# Patient Record
Sex: MALE | Race: OTHER | Employment: FULL TIME | URBAN - METROPOLITAN AREA
[De-identification: names, ages, dates, MRNs, and addresses within clinical notes are randomized per-mention and may not be internally consistent; named-entity substitution may affect disease eponyms.]

---

## 2017-10-21 ENCOUNTER — HOSPITAL ENCOUNTER (EMERGENCY)
Age: 27
Discharge: HOME | End: 2017-10-21
Attending: EMERGENCY MEDICINE
Payer: COMMERCIAL

## 2017-10-21 VITALS
RESPIRATION RATE: 18 BRPM | HEART RATE: 89 BPM | TEMPERATURE: 97.7 F | OXYGEN SATURATION: 96 % | SYSTOLIC BLOOD PRESSURE: 134 MMHG | DIASTOLIC BLOOD PRESSURE: 79 MMHG

## 2017-10-21 DIAGNOSIS — H60.392 INFECTIVE OTITIS EXTERNA OF LEFT EAR: ICD-10-CM

## 2017-10-21 DIAGNOSIS — H61.22 EXCESSIVE EAR WAX, LEFT: Primary | ICD-10-CM

## 2017-10-21 PROCEDURE — 6370000000 HC RX 637 (ALT 250 FOR IP): Performed by: EMERGENCY MEDICINE

## 2017-10-21 PROCEDURE — G0381 LEV 2 HOSP TYPE B ED VISIT: HCPCS

## 2017-10-21 RX ORDER — CIPROFLOXACIN AND DEXAMETHASONE 3; 1 MG/ML; MG/ML
4 SUSPENSION/ DROPS AURICULAR (OTIC) 2 TIMES DAILY
Status: DISCONTINUED | OUTPATIENT
Start: 2017-10-21 | End: 2017-10-21 | Stop reason: HOSPADM

## 2017-10-21 RX ADMIN — CIPROFLOXACIN AND DEXAMETHASONE 4 DROP: 3; 1 SUSPENSION/ DROPS AURICULAR (OTIC) at 15:32

## 2017-10-21 RX ADMIN — CARBAMIDE PEROXIDE 6.5% 5 DROP: 6.5 LIQUID AURICULAR (OTIC) at 15:06

## 2017-10-21 ASSESSMENT — ENCOUNTER SYMPTOMS
ABDOMINAL PAIN: 0
RHINORRHEA: 0
SHORTNESS OF BREATH: 0

## 2017-10-21 ASSESSMENT — PAIN SCALES - GENERAL: PAINLEVEL_OUTOF10: 7

## 2017-10-21 ASSESSMENT — PAIN DESCRIPTION - LOCATION: LOCATION: EAR

## 2017-10-21 ASSESSMENT — PAIN DESCRIPTION - ORIENTATION: ORIENTATION: LEFT

## 2017-10-21 ASSESSMENT — PAIN DESCRIPTION - DESCRIPTORS: DESCRIPTORS: DISCOMFORT

## 2017-10-21 NOTE — ED PROVIDER NOTES
tenderness noted during ear examination, no mastoid process tenderness or erythema, tympanic membrane was difficult to assess given that there is impaction of ear wax right in front of the tympanic membrane, no discharge, no bleeding noted, no foreign body   Eyes: Right eye exhibits no discharge. Left eye exhibits no discharge. Cardiovascular: Normal rate, regular rhythm and normal heart sounds. Exam reveals no friction rub. No murmur heard. Pulmonary/Chest: Effort normal and breath sounds normal. No stridor. No respiratory distress. He has no wheezes. He has no rales. He exhibits no tenderness. Abdominal: Soft. He exhibits no distension. There is no tenderness. There is no guarding. Neurological: He is alert and oriented to person, place, and time. Skin: Skin is warm and dry. He is not diaphoretic. Nursing note and vitals reviewed. DIFFERENTIAL  DIAGNOSIS     PLAN (LABS / IMAGING / EKG):  No orders of the defined types were placed in this encounter. MEDICATIONS ORDERED:  Orders Placed This Encounter   Medications    ciprofloxacin-dexamethasone (CIPRODEX) otic suspension 4 drop    carbamide peroxide (DEBROX) 6.5 % otic solution 5 drop       DIAGNOSTIC RESULTS / EMERGENCY DEPARTMENT COURSE / MDM     LABS:  No results found for this visit on 10/21/17. RADIOLOGY:  Not indicated    EKG  Not indicated    EMERGENCY DEPARTMENT COURSE:    MDM: Patient with a impaction as well as what is possibly otitis externa. We'll give patient Ciprodex drops as well as drops for removal of earwax. Given how tender patient was during examination with otoscope would be difficult to attempt extraction given how far earwax is impacted and also significant amount of pain. We'll discharge patient with follow-up with otolaryngology. Advised to return to the emergency department if symptoms do not improve. Patient is agreeable plan. PROCEDURES:  None    CONSULTS:  None    FINAL IMPRESSION      1.  Excessive

## 2018-05-18 ENCOUNTER — HOSPITAL ENCOUNTER (OUTPATIENT)
Dept: GENERAL RADIOLOGY | Age: 28
Discharge: HOME | End: 2018-05-20
Payer: COMMERCIAL

## 2018-05-18 ENCOUNTER — HOSPITAL ENCOUNTER (OUTPATIENT)
Age: 28
Discharge: HOME | End: 2018-05-20
Payer: COMMERCIAL

## 2018-05-18 DIAGNOSIS — S89.92XA INJURY OF LEFT KNEE, INITIAL ENCOUNTER: ICD-10-CM

## 2018-05-18 DIAGNOSIS — G89.29 NECK PAIN, CHRONIC: ICD-10-CM

## 2018-05-18 DIAGNOSIS — M54.2 NECK PAIN, CHRONIC: ICD-10-CM

## 2018-05-18 PROCEDURE — 72040 X-RAY EXAM NECK SPINE 2-3 VW: CPT

## 2018-05-18 PROCEDURE — 73560 X-RAY EXAM OF KNEE 1 OR 2: CPT

## 2018-05-18 PROCEDURE — 72072 X-RAY EXAM THORAC SPINE 3VWS: CPT

## 2018-07-23 ENCOUNTER — HOSPITAL ENCOUNTER (INPATIENT)
Age: 28
LOS: 2 days | Discharge: HOME | DRG: 420 | End: 2018-07-26
Attending: EMERGENCY MEDICINE | Admitting: INTERNAL MEDICINE
Payer: COMMERCIAL

## 2018-07-23 DIAGNOSIS — E11.10 DIABETIC KETOACIDOSIS WITHOUT COMA ASSOCIATED WITH TYPE 2 DIABETES MELLITUS (HCC): Primary | ICD-10-CM

## 2018-07-23 LAB
ABSOLUTE EOS #: 0.24 K/UL (ref 0–0.44)
ABSOLUTE IMMATURE GRANULOCYTE: <0.03 K/UL (ref 0–0.3)
ABSOLUTE LYMPH #: 1.73 K/UL (ref 1.1–3.7)
ABSOLUTE MONO #: 0.43 K/UL (ref 0.1–1.2)
ALLEN TEST: ABNORMAL
ANION GAP SERPL CALCULATED.3IONS-SCNC: 16 MMOL/L (ref 9–17)
ANION GAP: 15 MMOL/L (ref 7–16)
BASOPHILS # BLD: 1 % (ref 0–2)
BASOPHILS ABSOLUTE: 0.03 K/UL (ref 0–0.2)
BETA-HYDROXYBUTYRATE: 2.27 MMOL/L (ref 0.02–0.27)
BILIRUBIN URINE: NEGATIVE
BUN BLDV-MCNC: 15 MG/DL (ref 6–20)
BUN/CREAT BLD: ABNORMAL (ref 9–20)
CALCIUM SERPL-MCNC: 8.9 MG/DL (ref 8.6–10.4)
CHLORIDE BLD-SCNC: 85 MMOL/L (ref 98–107)
CHP ED QC CHECK: ABNORMAL
CO2: 18 MMOL/L (ref 20–31)
COLOR: YELLOW
COMMENT UA: ABNORMAL
CREAT SERPL-MCNC: 1 MG/DL (ref 0.7–1.2)
DIFFERENTIAL TYPE: ABNORMAL
EOSINOPHILS RELATIVE PERCENT: 4 % (ref 1–4)
FIO2: ABNORMAL
GFR AFRICAN AMERICAN: >60 ML/MIN
GFR NON-AFRICAN AMERICAN: >60 ML/MIN
GFR NON-AFRICAN AMERICAN: >60 ML/MIN
GFR SERPL CREATININE-BSD FRML MDRD: >60 ML/MIN
GFR SERPL CREATININE-BSD FRML MDRD: ABNORMAL ML/MIN/{1.73_M2}
GFR SERPL CREATININE-BSD FRML MDRD: ABNORMAL ML/MIN/{1.73_M2}
GFR SERPL CREATININE-BSD FRML MDRD: NORMAL ML/MIN/{1.73_M2}
GLUCOSE BLD-MCNC: 1005 MG/DL (ref 70–99)
GLUCOSE BLD-MCNC: 700 MG/DL
GLUCOSE BLD-MCNC: >700 MG/DL (ref 74–100)
GLUCOSE URINE: ABNORMAL
HCO3 VENOUS: 20.1 MMOL/L (ref 22–29)
HCT VFR BLD CALC: 39.8 % (ref 40.7–50.3)
HEMOGLOBIN: 13 G/DL (ref 13–17)
IMMATURE GRANULOCYTES: 0 %
KETONES, URINE: ABNORMAL
LEUKOCYTE ESTERASE, URINE: NEGATIVE
LYMPHOCYTES # BLD: 30 % (ref 24–43)
MCH RBC QN AUTO: 30 PG (ref 25.2–33.5)
MCHC RBC AUTO-ENTMCNC: 32.7 G/DL (ref 28.4–34.8)
MCV RBC AUTO: 91.9 FL (ref 82.6–102.9)
MODE: ABNORMAL
MONOCYTES # BLD: 8 % (ref 3–12)
NEGATIVE BASE EXCESS, VEN: 4 (ref 0–2)
NITRITE, URINE: NEGATIVE
NRBC AUTOMATED: 0 PER 100 WBC
O2 DEVICE/FLOW/%: ABNORMAL
O2 SAT, VEN: 82 % (ref 60–85)
PATIENT TEMP: ABNORMAL
PCO2, VEN: 33.5 MM HG (ref 41–51)
PDW BLD-RTO: 12.1 % (ref 11.8–14.4)
PH UA: 5 (ref 5–8)
PH VENOUS: 7.39 (ref 7.32–7.43)
PLATELET # BLD: 140 K/UL (ref 138–453)
PLATELET ESTIMATE: ABNORMAL
PMV BLD AUTO: 12.6 FL (ref 8.1–13.5)
PO2, VEN: 46.3 MM HG (ref 30–50)
POC CHLORIDE: 90 MMOL/L (ref 98–107)
POC CREATININE: 0.77 MG/DL (ref 0.51–1.19)
POC HEMATOCRIT: 42 % (ref 41–53)
POC HEMOGLOBIN: 14.4 G/DL (ref 13.5–17.5)
POC IONIZED CALCIUM: 1.16 MMOL/L (ref 1.15–1.33)
POC LACTIC ACID: 1.35 MMOL/L (ref 0.56–1.39)
POC PCO2 TEMP: ABNORMAL MM HG
POC PH TEMP: ABNORMAL
POC PO2 TEMP: ABNORMAL MM HG
POC POTASSIUM: 4.6 MMOL/L (ref 3.5–4.5)
POC SODIUM: 125 MMOL/L (ref 138–146)
POSITIVE BASE EXCESS, VEN: ABNORMAL (ref 0–3)
POTASSIUM SERPL-SCNC: 4.7 MMOL/L (ref 3.7–5.3)
PROTEIN UA: NEGATIVE
RBC # BLD: 4.33 M/UL (ref 4.21–5.77)
RBC # BLD: ABNORMAL 10*6/UL
SAMPLE SITE: ABNORMAL
SEG NEUTROPHILS: 57 % (ref 36–65)
SEGMENTED NEUTROPHILS ABSOLUTE COUNT: 3.28 K/UL (ref 1.5–8.1)
SODIUM BLD-SCNC: 119 MMOL/L (ref 135–144)
SPECIFIC GRAVITY UA: 1.03 (ref 1–1.03)
TOTAL CO2, VENOUS: 21 MMOL/L (ref 23–30)
TURBIDITY: CLEAR
URINE HGB: NEGATIVE
UROBILINOGEN, URINE: NORMAL
WBC # BLD: 5.7 K/UL (ref 3.5–11.3)
WBC # BLD: ABNORMAL 10*3/UL

## 2018-07-23 PROCEDURE — 82947 ASSAY GLUCOSE BLOOD QUANT: CPT

## 2018-07-23 PROCEDURE — 80048 BASIC METABOLIC PNL TOTAL CA: CPT

## 2018-07-23 PROCEDURE — 85014 HEMATOCRIT: CPT

## 2018-07-23 PROCEDURE — 2580000003 HC RX 258: Performed by: PHYSICIAN ASSISTANT

## 2018-07-23 PROCEDURE — 82565 ASSAY OF CREATININE: CPT

## 2018-07-23 PROCEDURE — 82803 BLOOD GASES ANY COMBINATION: CPT

## 2018-07-23 PROCEDURE — 99285 EMERGENCY DEPT VISIT HI MDM: CPT

## 2018-07-23 PROCEDURE — 84132 ASSAY OF SERUM POTASSIUM: CPT

## 2018-07-23 PROCEDURE — 82010 KETONE BODYS QUAN: CPT

## 2018-07-23 PROCEDURE — 83605 ASSAY OF LACTIC ACID: CPT

## 2018-07-23 PROCEDURE — 85025 COMPLETE CBC W/AUTO DIFF WBC: CPT

## 2018-07-23 PROCEDURE — 82435 ASSAY OF BLOOD CHLORIDE: CPT

## 2018-07-23 PROCEDURE — 84295 ASSAY OF SERUM SODIUM: CPT

## 2018-07-23 PROCEDURE — 81003 URINALYSIS AUTO W/O SCOPE: CPT

## 2018-07-23 PROCEDURE — 82330 ASSAY OF CALCIUM: CPT

## 2018-07-23 RX ORDER — 0.9 % SODIUM CHLORIDE 0.9 %
1000 INTRAVENOUS SOLUTION INTRAVENOUS ONCE
Status: COMPLETED | OUTPATIENT
Start: 2018-07-23 | End: 2018-07-23

## 2018-07-23 RX ADMIN — SODIUM CHLORIDE 1000 ML: 9 INJECTION, SOLUTION INTRAVENOUS at 22:50

## 2018-07-23 ASSESSMENT — ENCOUNTER SYMPTOMS
SORE THROAT: 0
WHEEZING: 0
BACK PAIN: 0
NAUSEA: 0
VOMITING: 0
RHINORRHEA: 0
SHORTNESS OF BREATH: 0
COUGH: 0
EYE PAIN: 0
EYE ITCHING: 0
EYE DISCHARGE: 0

## 2018-07-24 PROBLEM — E11.9 DIABETES MELLITUS WITH NO COMPLICATION (HCC): Status: ACTIVE | Noted: 2018-07-24

## 2018-07-24 PROBLEM — E87.20 METABOLIC ACIDOSIS: Status: ACTIVE | Noted: 2018-07-24

## 2018-07-24 PROBLEM — E11.10 DIABETIC KETOACIDOSIS WITHOUT COMA ASSOCIATED WITH TYPE 2 DIABETES MELLITUS (HCC): Status: ACTIVE | Noted: 2018-07-24

## 2018-07-24 LAB
ANION GAP SERPL CALCULATED.3IONS-SCNC: 15 MMOL/L (ref 9–17)
ANION GAP SERPL CALCULATED.3IONS-SCNC: 15 MMOL/L (ref 9–17)
ANION GAP SERPL CALCULATED.3IONS-SCNC: 17 MMOL/L (ref 9–17)
BUN BLDV-MCNC: 11 MG/DL (ref 6–20)
BUN BLDV-MCNC: 12 MG/DL (ref 6–20)
BUN BLDV-MCNC: 13 MG/DL (ref 6–20)
BUN/CREAT BLD: ABNORMAL (ref 9–20)
CALCIUM SERPL-MCNC: 8.3 MG/DL (ref 8.6–10.4)
CALCIUM SERPL-MCNC: 8.3 MG/DL (ref 8.6–10.4)
CALCIUM SERPL-MCNC: 8.6 MG/DL (ref 8.6–10.4)
CHLORIDE BLD-SCNC: 100 MMOL/L (ref 98–107)
CHLORIDE BLD-SCNC: 99 MMOL/L (ref 98–107)
CHLORIDE BLD-SCNC: 99 MMOL/L (ref 98–107)
CO2: 20 MMOL/L (ref 20–31)
CO2: 21 MMOL/L (ref 20–31)
CO2: 22 MMOL/L (ref 20–31)
CREAT SERPL-MCNC: 0.7 MG/DL (ref 0.7–1.2)
CREAT SERPL-MCNC: 0.73 MG/DL (ref 0.7–1.2)
CREAT SERPL-MCNC: 0.75 MG/DL (ref 0.7–1.2)
ESTIMATED AVERAGE GLUCOSE: 192 MG/DL
GFR AFRICAN AMERICAN: >60 ML/MIN
GFR NON-AFRICAN AMERICAN: >60 ML/MIN
GFR SERPL CREATININE-BSD FRML MDRD: ABNORMAL ML/MIN/{1.73_M2}
GLUCOSE BLD-MCNC: 232 MG/DL (ref 75–110)
GLUCOSE BLD-MCNC: 234 MG/DL (ref 70–99)
GLUCOSE BLD-MCNC: 236 MG/DL (ref 75–110)
GLUCOSE BLD-MCNC: 241 MG/DL (ref 70–99)
GLUCOSE BLD-MCNC: 281 MG/DL (ref 75–110)
GLUCOSE BLD-MCNC: 287 MG/DL (ref 75–110)
GLUCOSE BLD-MCNC: 311 MG/DL (ref 75–110)
GLUCOSE BLD-MCNC: 319 MG/DL (ref 75–110)
GLUCOSE BLD-MCNC: 326 MG/DL (ref 75–110)
GLUCOSE BLD-MCNC: 329 MG/DL (ref 75–110)
GLUCOSE BLD-MCNC: 331 MG/DL (ref 70–99)
GLUCOSE BLD-MCNC: 344 MG/DL (ref 75–110)
GLUCOSE BLD-MCNC: 345 MG/DL (ref 75–110)
GLUCOSE BLD-MCNC: 812 MG/DL (ref 70–99)
GLUCOSE BLD-MCNC: >600 MG/DL (ref 75–110)
HBA1C MFR BLD: 8.3 % (ref 4–6)
LIPASE: 34 U/L (ref 13–60)
MAGNESIUM: 1.9 MG/DL (ref 1.6–2.6)
MAGNESIUM: 1.9 MG/DL (ref 1.6–2.6)
PHOSPHORUS: 3.3 MG/DL (ref 2.5–4.5)
PHOSPHORUS: 4.1 MG/DL (ref 2.5–4.5)
POTASSIUM SERPL-SCNC: 3.4 MMOL/L (ref 3.7–5.3)
POTASSIUM SERPL-SCNC: 3.6 MMOL/L (ref 3.7–5.3)
POTASSIUM SERPL-SCNC: 3.8 MMOL/L (ref 3.7–5.3)
SERUM OSMOLALITY: 314 MOSM/KG (ref 275–295)
SODIUM BLD-SCNC: 135 MMOL/L (ref 135–144)
SODIUM BLD-SCNC: 136 MMOL/L (ref 135–144)
SODIUM BLD-SCNC: 137 MMOL/L (ref 135–144)

## 2018-07-24 PROCEDURE — 2580000003 HC RX 258: Performed by: INTERNAL MEDICINE

## 2018-07-24 PROCEDURE — 96365 THER/PROPH/DIAG IV INF INIT: CPT

## 2018-07-24 PROCEDURE — 2060000000 HC ICU INTERMEDIATE R&B

## 2018-07-24 PROCEDURE — 94762 N-INVAS EAR/PLS OXIMTRY CONT: CPT

## 2018-07-24 PROCEDURE — 97162 PT EVAL MOD COMPLEX 30 MIN: CPT

## 2018-07-24 PROCEDURE — 83036 HEMOGLOBIN GLYCOSYLATED A1C: CPT

## 2018-07-24 PROCEDURE — 80048 BASIC METABOLIC PNL TOTAL CA: CPT

## 2018-07-24 PROCEDURE — 84100 ASSAY OF PHOSPHORUS: CPT

## 2018-07-24 PROCEDURE — 83690 ASSAY OF LIPASE: CPT

## 2018-07-24 PROCEDURE — 36415 COLL VENOUS BLD VENIPUNCTURE: CPT

## 2018-07-24 PROCEDURE — 82947 ASSAY GLUCOSE BLOOD QUANT: CPT

## 2018-07-24 PROCEDURE — 97530 THERAPEUTIC ACTIVITIES: CPT

## 2018-07-24 PROCEDURE — 6370000000 HC RX 637 (ALT 250 FOR IP): Performed by: EMERGENCY MEDICINE

## 2018-07-24 PROCEDURE — 6370000000 HC RX 637 (ALT 250 FOR IP): Performed by: INTERNAL MEDICINE

## 2018-07-24 PROCEDURE — 99223 1ST HOSP IP/OBS HIGH 75: CPT | Performed by: INTERNAL MEDICINE

## 2018-07-24 PROCEDURE — 94640 AIRWAY INHALATION TREATMENT: CPT

## 2018-07-24 PROCEDURE — 6360000002 HC RX W HCPCS: Performed by: NURSE PRACTITIONER

## 2018-07-24 PROCEDURE — G8979 MOBILITY GOAL STATUS: HCPCS

## 2018-07-24 PROCEDURE — 99406 BEHAV CHNG SMOKING 3-10 MIN: CPT

## 2018-07-24 PROCEDURE — 6370000000 HC RX 637 (ALT 250 FOR IP): Performed by: NURSE PRACTITIONER

## 2018-07-24 PROCEDURE — 83930 ASSAY OF BLOOD OSMOLALITY: CPT

## 2018-07-24 PROCEDURE — G8978 MOBILITY CURRENT STATUS: HCPCS

## 2018-07-24 PROCEDURE — 2580000003 HC RX 258: Performed by: EMERGENCY MEDICINE

## 2018-07-24 PROCEDURE — 83735 ASSAY OF MAGNESIUM: CPT

## 2018-07-24 RX ORDER — POTASSIUM CHLORIDE 7.45 MG/ML
10 INJECTION INTRAVENOUS PRN
Status: DISCONTINUED | OUTPATIENT
Start: 2018-07-24 | End: 2018-07-26 | Stop reason: HOSPADM

## 2018-07-24 RX ORDER — MAGNESIUM SULFATE 1 G/100ML
1 INJECTION INTRAVENOUS PRN
Status: DISCONTINUED | OUTPATIENT
Start: 2018-07-24 | End: 2018-07-26 | Stop reason: HOSPADM

## 2018-07-24 RX ORDER — NICOTINE POLACRILEX 4 MG
15 LOZENGE BUCCAL PRN
Status: DISCONTINUED | OUTPATIENT
Start: 2018-07-24 | End: 2018-07-26 | Stop reason: HOSPADM

## 2018-07-24 RX ORDER — SODIUM CHLORIDE 450 MG/100ML
INJECTION, SOLUTION INTRAVENOUS CONTINUOUS
Status: DISCONTINUED | OUTPATIENT
Start: 2018-07-24 | End: 2018-07-24

## 2018-07-24 RX ORDER — 0.9 % SODIUM CHLORIDE 0.9 %
15 INTRAVENOUS SOLUTION INTRAVENOUS ONCE
Status: DISCONTINUED | OUTPATIENT
Start: 2018-07-24 | End: 2018-07-24

## 2018-07-24 RX ORDER — INSULIN GLARGINE 100 [IU]/ML
15 INJECTION, SOLUTION SUBCUTANEOUS
Status: DISCONTINUED | OUTPATIENT
Start: 2018-07-24 | End: 2018-07-26

## 2018-07-24 RX ORDER — SODIUM CHLORIDE, SODIUM LACTATE, POTASSIUM CHLORIDE, AND CALCIUM CHLORIDE .6; .31; .03; .02 G/100ML; G/100ML; G/100ML; G/100ML
1000 INJECTION, SOLUTION INTRAVENOUS ONCE
Status: DISCONTINUED | OUTPATIENT
Start: 2018-07-24 | End: 2018-07-24

## 2018-07-24 RX ORDER — NAPROXEN 250 MG/1
500 TABLET ORAL 2 TIMES DAILY PRN
Status: DISCONTINUED | OUTPATIENT
Start: 2018-07-24 | End: 2018-07-26 | Stop reason: HOSPADM

## 2018-07-24 RX ORDER — DEXTROSE AND SODIUM CHLORIDE 5; .45 G/100ML; G/100ML
INJECTION, SOLUTION INTRAVENOUS CONTINUOUS PRN
Status: DISCONTINUED | OUTPATIENT
Start: 2018-07-24 | End: 2018-07-24 | Stop reason: SDUPTHER

## 2018-07-24 RX ORDER — DEXTROSE MONOHYDRATE 25 G/50ML
12.5 INJECTION, SOLUTION INTRAVENOUS PRN
Status: DISCONTINUED | OUTPATIENT
Start: 2018-07-24 | End: 2018-07-26 | Stop reason: HOSPADM

## 2018-07-24 RX ORDER — SODIUM CHLORIDE 9 MG/ML
INJECTION, SOLUTION INTRAVENOUS CONTINUOUS
Status: DISCONTINUED | OUTPATIENT
Start: 2018-07-24 | End: 2018-07-25

## 2018-07-24 RX ORDER — DEXTROSE MONOHYDRATE 50 MG/ML
100 INJECTION, SOLUTION INTRAVENOUS PRN
Status: DISCONTINUED | OUTPATIENT
Start: 2018-07-24 | End: 2018-07-26 | Stop reason: HOSPADM

## 2018-07-24 RX ORDER — DEXTROSE AND SODIUM CHLORIDE 5; .45 G/100ML; G/100ML
INJECTION, SOLUTION INTRAVENOUS CONTINUOUS PRN
Status: DISCONTINUED | OUTPATIENT
Start: 2018-07-24 | End: 2018-07-26 | Stop reason: HOSPADM

## 2018-07-24 RX ORDER — SODIUM CHLORIDE 9 MG/ML
INJECTION, SOLUTION INTRAVENOUS CONTINUOUS
Status: DISCONTINUED | OUTPATIENT
Start: 2018-07-24 | End: 2018-07-24

## 2018-07-24 RX ORDER — NAPROXEN 250 MG/1
500 TABLET ORAL 2 TIMES DAILY WITH MEALS
Status: DISCONTINUED | OUTPATIENT
Start: 2018-07-24 | End: 2018-07-24

## 2018-07-24 RX ORDER — SODIUM CHLORIDE, SODIUM LACTATE, POTASSIUM CHLORIDE, CALCIUM CHLORIDE 600; 310; 30; 20 MG/100ML; MG/100ML; MG/100ML; MG/100ML
INJECTION, SOLUTION INTRAVENOUS CONTINUOUS
Status: DISCONTINUED | OUTPATIENT
Start: 2018-07-24 | End: 2018-07-24

## 2018-07-24 RX ADMIN — INSULIN LISPRO 6 UNITS: 100 INJECTION, SOLUTION INTRAVENOUS; SUBCUTANEOUS at 13:46

## 2018-07-24 RX ADMIN — INSULIN GLARGINE 15 UNITS: 100 INJECTION, SOLUTION SUBCUTANEOUS at 13:46

## 2018-07-24 RX ADMIN — ENOXAPARIN SODIUM 40 MG: 40 INJECTION SUBCUTANEOUS at 11:01

## 2018-07-24 RX ADMIN — MOMETASONE FUROATE AND FORMOTEROL FUMARATE DIHYDRATE 2 PUFF: 200; 5 AEROSOL RESPIRATORY (INHALATION) at 20:03

## 2018-07-24 RX ADMIN — SODIUM CHLORIDE: 9 INJECTION, SOLUTION INTRAVENOUS at 10:48

## 2018-07-24 RX ADMIN — SODIUM CHLORIDE, POTASSIUM CHLORIDE, SODIUM LACTATE AND CALCIUM CHLORIDE 125 ML/HR: 600; 310; 30; 20 INJECTION, SOLUTION INTRAVENOUS at 03:07

## 2018-07-24 RX ADMIN — INSULIN LISPRO 12 UNITS: 100 INJECTION, SOLUTION INTRAVENOUS; SUBCUTANEOUS at 18:58

## 2018-07-24 RX ADMIN — MOMETASONE FUROATE AND FORMOTEROL FUMARATE DIHYDRATE 2 PUFF: 200; 5 AEROSOL RESPIRATORY (INHALATION) at 09:06

## 2018-07-24 RX ADMIN — INSULIN LISPRO 3 UNITS: 100 INJECTION, SOLUTION INTRAVENOUS; SUBCUTANEOUS at 10:56

## 2018-07-24 RX ADMIN — INSULIN LISPRO 5 UNITS: 100 INJECTION, SOLUTION INTRAVENOUS; SUBCUTANEOUS at 21:12

## 2018-07-24 RX ADMIN — SODIUM CHLORIDE: 9 INJECTION, SOLUTION INTRAVENOUS at 18:27

## 2018-07-24 RX ADMIN — INSULIN LISPRO 2 UNITS: 100 INJECTION, SOLUTION INTRAVENOUS; SUBCUTANEOUS at 06:17

## 2018-07-24 RX ADMIN — SODIUM CHLORIDE 0.1 UNITS/KG/HR: 9 INJECTION, SOLUTION INTRAVENOUS at 01:31

## 2018-07-24 ASSESSMENT — PAIN SCALES - GENERAL
PAINLEVEL_OUTOF10: 0
PAINLEVEL_OUTOF10: 8

## 2018-07-24 ASSESSMENT — PAIN DESCRIPTION - ORIENTATION
ORIENTATION: RIGHT
ORIENTATION: RIGHT

## 2018-07-24 ASSESSMENT — PAIN DESCRIPTION - LOCATION
LOCATION: LEG
LOCATION: LEG

## 2018-07-24 ASSESSMENT — PAIN DESCRIPTION - PAIN TYPE
TYPE: ACUTE PAIN;NEUROPATHIC PAIN
TYPE: ACUTE PAIN

## 2018-07-24 NOTE — ED PROVIDER NOTES
[facility]  ED  Emergency Department Encounter  Mid Level Provider     Pt Name: Yenny Royal  MRN: [id number]  [place] 1990  Date of evaluation: 7/23/18  PCP:  Stanley Sutton MD    [address] [place] [address]       Chief Complaint   Patient presents with    Blood Sugar Problem       HISTORY OF PRESENT ILLNESS  (Location/Symptom, Timing/Onset, Context/Setting, Quality, Duration, Modifying Factors, Severity.)      Yenny Royal is a 33 y.o. male who presents with Elevated blood sugar. Patient states that for the past week he has been very thirsty and urinating frequently. His daughter is type I diabetic and his wife suggested that he check his blood sugar. He checked it on his daughter's meter and it read over 600. He then used one of her ketone test strips and discovered that he had moderate ketones in his urine. This was just prior to arrival.  He has had no previous history of diabetes. He has noticed some weight loss but is unsure of how much. He states that his hands feel loose and some pants that he could not fit into before now fit him. He has had no nausea or vomiting. PAST MEDICAL / SURGICAL / SOCIAL / FAMILY HISTORY      has a past medical history of Asthma; Carpal tunnel syndrome, bilateral; and Chronic back pain. has a past surgical history that includes Neck surgery (N/A, year of 2010). Social History     Social History    Marital status: Single     Spouse name: N/A    Number of children: N/A    Years of education: N/A     Occupational History    Not on file.      Social History Main Topics    Smoking status: Current Every Day Smoker     Packs/day: 1.00     Years: 4.00     Types: Cigarettes    Smokeless tobacco: Never Used    Alcohol use Yes    Drug use: Unknown    Sexual activity: Not on file     Other Topics Concern    Not on file     Social History Narrative    No narrative on file       Family History   Problem Relation Age of Onset    Schizophrenia Mother     Glaucoma Mother     Asthma Mother        Allergies:  Bee venom and Penicillins    Home Medications:  Prior to Admission medications    Medication Sig Start Date End Date Taking? Authorizing Provider   fluticasone-salmeterol (ADVAIR DISKUS) 250-50 MCG/DOSE AEPB Inhale 1 puff into the lungs every 12 hours. 3/21/13   Chrissy Talavera MD   albuterol (PROVENTIL HFA) 108 (90 BASE) MCG/ACT inhaler Inhale 2 puffs into the lungs every 4 hours as needed for Wheezing. 3/21/13   Ag Win MD   naproxen (NAPROSYN) 500 MG tablet Take 1 tablet by mouth 2 times daily (with meals). 3/21/13   Ag Win MD   Elastic Bandages & Supports (WRIST SPLINT/COCK-UP/RIGHT M) MISC 1 each by Does not apply route daily as needed. 3/21/13   Ag Win MD   Elastic Bandages & Supports (WRIST SPLINT/COCK-UP/LEFT M) MISC 1 each by Does not apply route daily as needed. 3/21/13   Ag Win MD       patient's medication list has been reviewed as entered by the nursing staff. REVIEW OF SYSTEMS    (2-9 systems for level 4, 10 or more for level 5)      Review of Systems   Constitutional: Positive for diaphoresis. Negative for chills and fever. HENT: Negative for ear pain, rhinorrhea and sore throat. Eyes: Negative for pain, discharge and itching. Respiratory: Negative for cough, shortness of breath and wheezing. Cardiovascular: Negative for chest pain and palpitations. Gastrointestinal: Negative for nausea and vomiting. Endocrine: Positive for polydipsia and polyuria. Genitourinary: Negative for difficulty urinating, dysuria and hematuria. Musculoskeletal: Negative for back pain and myalgias. Skin: Negative for rash and wound. Neurological: Negative for dizziness and headaches. Psychiatric/Behavioral: Negative for dysphoric mood. PHYSICAL EXAM   (up to 7 for level 4, 8 or more for level 5)      INITIAL VITALS:  height is 5' 9\" (1.753 m) and weight is 215 lb (97.5 kg).  His oral temperature is 97.7 °F (36.5 °C). His blood pressure is 169/97 (abnormal) and his pulse is 94. His respiration is 18 and oxygen saturation is 97%. Physical Exam   Constitutional: He is oriented to person, place, and time. He appears well-developed and well-nourished. HENT:   Head: Normocephalic and atraumatic. Right Ear: External ear normal.   Left Ear: External ear normal.   Eyes: Right eye exhibits no discharge. Left eye exhibits no discharge. No scleral icterus. Neck: Normal range of motion. No tracheal deviation present. Cardiovascular: Normal rate, regular rhythm and normal heart sounds. Exam reveals no gallop. No murmur heard. Pulmonary/Chest: Effort normal and breath sounds normal. No stridor. No respiratory distress. Abdominal: There is no tenderness. There is no rebound and no guarding. Musculoskeletal: Normal range of motion. He exhibits no edema. Neurological: He is alert and oriented to person, place, and time. Coordination normal.   Skin: Skin is warm. No rash (on exposed surfaces) noted. He is diaphoretic. No pallor. Psychiatric: He has a normal mood and affect. His behavior is normal.       DIFFERENTIAL  DIAGNOSIS       DKA, new onset diabetes    PLAN (LABS / IMAGING / EKG):  Orders Placed This Encounter   Procedures    BASIC METABOLIC PANEL    BETA-HYDROXYBUTYRATE    Urinalysis    CBC Auto Differential    Hemoglobin and hematocrit, blood    SODIUM (POC)    POTASSIUM (POC)    CHLORIDE (POC)    CALCIUM, IONIC (POC)    POCT Glucose    POC Blood Gas and Chemistry    Venous Blood Gas, POC    Creatinine W/GFR Point of Care    Lactic Acid, POC    POCT Glucose    Anion Gap (Calc) POC    Insert peripheral IV       MEDICATIONS ORDERED:  Orders Placed This Encounter   Medications    0.9 % sodium chloride bolus       Controlled Substances Monitoring:      DIAGNOSTIC RESULTS / EMERGENCY DEPARTMENT COURSE / MDM   Patient with new onset diabetes.   Patient does appear to be a compensated 82.6 - 102.9 fL    MCH 30.0 25.2 - 33.5 pg    MCHC 32.7 28.4 - 34.8 g/dL    RDW 12.1 11.8 - 14.4 %    Platelets 453 765 - 290 k/uL    MPV 12.6 8.1 - 13.5 fL    NRBC Automated 0.0 0.0 per 100 WBC    Differential Type NOT REPORTED     Seg Neutrophils 57 36 - 65 %    Lymphocytes 30 24 - 43 %    Monocytes 8 3 - 12 %    Eosinophils % 4 1 - 4 %    Basophils 1 0 - 2 %    Immature Granulocytes 0 0 %    Segs Absolute 3.28 1.50 - 8.10 k/uL    Absolute Lymph # 1.73 1.10 - 3.70 k/uL    Absolute Mono # 0.43 0.10 - 1.20 k/uL    Absolute Eos # 0.24 0.00 - 0.44 k/uL    Basophils # 0.03 0.00 - 0.20 k/uL    Absolute Immature Granulocyte <0.03 0.00 - 0.30 k/uL    WBC Morphology NOT REPORTED     RBC Morphology NOT REPORTED     Platelet Estimate NOT REPORTED    Hemoglobin and hematocrit, blood   Result Value Ref Range    POC Hemoglobin 14.4 13.5 - 17.5 g/dL    POC Hematocrit 42 41 - 53 %   SODIUM (POC)   Result Value Ref Range    POC Sodium 125 (L) 138 - 146 mmol/L   POTASSIUM (POC)   Result Value Ref Range    POC Potassium 4.6 (H) 3.5 - 4.5 mmol/L   CHLORIDE (POC)   Result Value Ref Range    POC Chloride 90 (L) 98 - 107 mmol/L   CALCIUM, IONIC (POC)   Result Value Ref Range    POC Ionized Calcium 1.16 1.15 - 1.33 mmol/L   POCT Glucose   Result Value Ref Range    Glucose 700 mg/dL    QC OK?  ok    Venous Blood Gas, POC   Result Value Ref Range    pH, Demarco 7.387 7.320 - 7.430    pCO2, Demarco 33.5 (L) 41.0 - 51.0 mm Hg    pO2, Demarco 46.3 30.0 - 50.0 mm Hg    HCO3, Venous 20.1 (L) 22.0 - 29.0 mmol/L    Total CO2, Venous 21 (L) 23.0 - 30.0 mmol/L    Negative Base Excess, Demarco 4 (H) 0.0 - 2.0    Positive Base Excess, Demarco NOT REPORTED 0.0 - 3.0    O2 Sat, Demarco 82 60.0 - 85.0 %    O2 Device/Flow/% NOT REPORTED     Kayode Test NOT REPORTED     Sample Site NOT REPORTED     Mode NOT REPORTED     FIO2 NOT REPORTED     Pt Temp NOT REPORTED     POC pH Temp NOT REPORTED     POC pCO2 Temp NOT REPORTED mm Hg    POC pO2 Temp NOT REPORTED mm Hg   Creatinine W/GFR

## 2018-07-24 NOTE — H&P
[facility]    HISTORY AND PHYSICAL EXAMINATION            Date:   7/24/2018  Patient name:  Susan Mojica  Date of admission:  7/23/2018 10:13 PM  MRN:   [id number]  Account:  [de-identified]  YOB: 1990  PCP:    Artemio Fabry, MD  Room:   3014/3014-01  Code Status:    Full Code    Chief Complaint:     Chief Complaint   Patient presents with    Blood Sugar Problem       History Obtained From:     patient, electronic medical record    History of Present Illness:     Admitted through ER with following history:    Susan Mojica is a 33 y.o. male who presents with Elevated blood sugar. Patient states that for the past week he has been very thirsty and urinating frequently. His daughter is type I diabetic and his wife suggested that he check his blood sugar. He checked it on his daughter's meter and it read over 600. He then used one of her ketone test strips and discovered that he had moderate ketones in his urine. This was just prior to arrival.  He has had no previous history of diabetes. He has noticed some weight loss but is unsure of how much. He states that his hands feel loose and some pants that he could not fit into before now fit him. He has had no nausea or vomiting.     His blood sugars in ER were reported to more than 1000 and CO2 was 18 with ketone positive  He was kept on insulin drip with which his sugars have started trending down, subsequently in ER his insulin drip was stopped  On floor his blood sugar was around 241, patient denies any nausea vomiting  Past Medical History:     Past Medical History:   Diagnosis Date    Asthma     Carpal tunnel syndrome, bilateral 3/21/2013    Chronic back pain 3/21/2013        Past Surgical History:     Past Surgical History:   Procedure Laterality Date    NECK SURGERY N/A year of 2010        Medications Prior to Admission:     Prior to Admission medications    Medication Sig Start Date End Date Taking? Authorizing Provider   fluticasone-salmeterol (ADVAIR DISKUS) 250-50 MCG/DOSE AEPB Inhale 1 puff into the lungs every 12 hours. 3/21/13  Yes Yudith Morocho MD   albuterol (PROVENTIL HFA) 108 (90 BASE) MCG/ACT inhaler Inhale 2 puffs into the lungs every 4 hours as needed for Wheezing. 3/21/13  Yes Emil Valerio MD        Allergies:     Bee venom and Penicillins    Social History:     Tobacco:    reports that he has been smoking Cigarettes. He has a 4.00 pack-year smoking history. He has never used smokeless tobacco.  Alcohol:      reports that he drinks alcohol. Drug Use:  has no drug history on file. Family History:     Family History   Problem Relation Age of Onset   Orlene Gala Schizophrenia Mother     Glaucoma Mother     Asthma Mother        Review of Systems:     Positive and Negative as described in HPI. CONSTITUTIONAL:  negative for fevers, chills, sweats, fatigue, weight loss  HEENT:  negative for vision, hearing changes, runny nose, throat pain  RESPIRATORY:  negative for shortness of breath, cough, congestion, wheezing.   CARDIOVASCULAR:  negative for chest pain, palpitations  GASTROINTESTINAL:  negative for nausea, vomiting, diarrhea, constipation, change in bowel habits, abdominal pain   GENITOURINARY:  negative for difficulty of urination, burning with urination, frequency   INTEGUMENT:  negative for rash, skin lesions, easy bruising   HEMATOLOGIC/LYMPHATIC:  negative for swelling/edema   ALLERGIC/IMMUNOLOGIC:  negative for urticaria , itching  ENDOCRINE:  negative increase in drinking, increase in urination, hot or cold intolerance  MUSCULOSKELETAL:  negative joint pains, muscle aches, swelling of joints  NEUROLOGICAL:  negative for headaches, dizziness, lightheadedness, numbness, pain, tingling extremities  BEHAVIOR/PSYCH:  negative for depression, anxiety    Physical Exam:   /70   Pulse 73   Temp 98.1 °F (36.7 °C) (Oral)   Resp 14   Ht 5' 9\" (1.753 m)   Wt 215 lb (97.5 kg)   SpO2 94%   BMI 31.75 kg/m²   Temp (24hrs), Av.9 °F (36.6 °C), Min:97.7 °F (36.5 °C), Max:98.1 °F (36.7 °C)    Recent Labs      18   0720  18   0843  18   1150  18   1327   POCGLU  329*  281*  311*  232*       Intake/Output Summary (Last 24 hours) at 18 1617  Last data filed at 18 0930   Gross per 24 hour   Intake                0 ml   Output              530 ml   Net             -530 ml       General Appearance:  alert, well appearing, and in no acute distress But lethargic  Mental status: oriented to person, place, and time with normal affect  Head:  normocephalic, atraumatic. Eye: no icterus, redness, pupils equal and reactive, extraocular eye movements intact, conjunctiva clear  Ear: normal external ear, no discharge, hearing intact  Nose:  no drainage noted  Mouth: mucous membranes moist  Neck: supple, no carotid bruits, thyroid not palpable  Lungs: Bilateral equal air entry, clear to ausculation, no wheezing, rales or rhonchi, normal effort  Cardiovascular: normal rate, regular rhythm, no murmur, gallop, rub. Abdomen: Soft, nontender, nondistended, normal bowel sounds, no hepatomegaly or splenomegaly  Neurologic: There are no new focal motor or sensory deficits, normal muscle tone and bulk, no abnormal sensation, normal speech, cranial nerves II through XII grossly intact  Skin: No gross lesions, rashes, bruising or bleeding on exposed skin area  Extremities:  peripheral pulses palpable, no pedal edema or calf pain with palpation  Psych: normal affect    Investigations:      Laboratory Testing:  Recent Results (from the past 24 hour(s))   POC Glucose Fingerstick    Collection Time: 18 10:25 PM   Result Value Ref Range    POC Glucose >600 (HH) 75 - 110 mg/dL   POCT Glucose    Collection Time: 18 10:27 PM   Result Value Ref Range    Glucose 700 mg/dL    QC OK?  ok    Venous Blood Gas, POC    Collection Time: 18 10:34 PM   Result Value 1,005 (HH) 70 - 99 mg/dL    BUN 15 6 - 20 mg/dL    CREATININE 1.00 0.70 - 1.20 mg/dL    Bun/Cre Ratio NOT REPORTED 9 - 20    Calcium 8.9 8.6 - 10.4 mg/dL    Sodium 119 (LL) 135 - 144 mmol/L    Potassium 4.7 3.7 - 5.3 mmol/L    Chloride 85 (L) 98 - 107 mmol/L    CO2 18 (L) 20 - 31 mmol/L    Anion Gap 16 9 - 17 mmol/L    GFR Non-African American >60 >60 mL/min    GFR African American >60 >60 mL/min    GFR Comment          GFR Staging NOT REPORTED    BETA-HYDROXYBUTYRATE    Collection Time: 07/23/18 10:37 PM   Result Value Ref Range    Beta-Hydroxybutyrate 2.27 (H) 0.02 - 0.27 mmol/L   Urinalysis    Collection Time: 07/23/18 10:37 PM   Result Value Ref Range    Color, UA YELLOW YEL    Turbidity UA CLEAR CLEAR    Glucose, Ur 3+ (A) NEG    Bilirubin Urine NEGATIVE NEG    Ketones, Urine SMALL (A) NEG    Specific Gravity, UA 1.028 1.005 - 1.030    Urine Hgb NEGATIVE NEG    pH, UA 5.0 5.0 - 8.0    Protein, UA NEGATIVE NEG    Urobilinogen, Urine Normal NORM    Nitrite, Urine NEGATIVE NEG    Leukocyte Esterase, Urine NEGATIVE NEG    Urinalysis Comments       Microscopic exam not performed based on chemical results unless requested in   CBC Auto Differential    Collection Time: 07/23/18 10:37 PM   Result Value Ref Range    WBC 5.7 3.5 - 11.3 k/uL    RBC 4.33 4.21 - 5.77 m/uL    Hemoglobin 13.0 13.0 - 17.0 g/dL    Hematocrit 39.8 (L) 40.7 - 50.3 %    MCV 91.9 82.6 - 102.9 fL    MCH 30.0 25.2 - 33.5 pg    MCHC 32.7 28.4 - 34.8 g/dL    RDW 12.1 11.8 - 14.4 %    Platelets 073 681 - 708 k/uL    MPV 12.6 8.1 - 13.5 fL    NRBC Automated 0.0 0.0 per 100 WBC    Differential Type NOT REPORTED     Seg Neutrophils 57 36 - 65 %    Lymphocytes 30 24 - 43 %    Monocytes 8 3 - 12 %    Eosinophils % 4 1 - 4 %    Basophils 1 0 - 2 %    Immature Granulocytes 0 0 %    Segs Absolute 3.28 1.50 - 8.10 k/uL    Absolute Lymph # 1.73 1.10 - 3.70 k/uL    Absolute Mono # 0.43 0.10 - 1.20 k/uL    Absolute Eos # 0.24 0.00 - 0.44 k/uL    Basophils # 0.03 0.00 - 0.20 k/uL    Absolute Immature Granulocyte <0.03 0.00 - 0.30 k/uL    WBC Morphology NOT REPORTED     RBC Morphology NOT REPORTED     Platelet Estimate NOT REPORTED    GLUCOSE, RANDOM    Collection Time: 07/24/18 12:16 AM   Result Value Ref Range    Glucose 812 (HH) 70 - 99 mg/dL   Lipase    Collection Time: 07/24/18 12:16 AM   Result Value Ref Range    Lipase 34 13 - 60 U/L   Osmolality    Collection Time: 07/24/18 12:16 AM   Result Value Ref Range    Serum Osmolality 314 (H) 275 - 295 mOsm/kg   POC Glucose Fingerstick    Collection Time: 07/24/18  2:34 AM   Result Value Ref Range    POC Glucose 326 (H) 75 - 110 mg/dL   Basic Metabolic Panel    Collection Time: 07/24/18  3:23 AM   Result Value Ref Range    Glucose 234 (H) 70 - 99 mg/dL    BUN 13 6 - 20 mg/dL    CREATININE 0.73 0.70 - 1.20 mg/dL    Bun/Cre Ratio NOT REPORTED 9 - 20    Calcium 8.3 (L) 8.6 - 10.4 mg/dL    Sodium 136 135 - 144 mmol/L    Potassium 3.4 (L) 3.7 - 5.3 mmol/L    Chloride 99 98 - 107 mmol/L    CO2 22 20 - 31 mmol/L    Anion Gap 15 9 - 17 mmol/L    GFR Non-African American >60 >60 mL/min    GFR African American >60 >60 mL/min    GFR Comment          GFR Staging NOT REPORTED    Magnesium    Collection Time: 07/24/18  3:23 AM   Result Value Ref Range    Magnesium 1.9 1.6 - 2.6 mg/dL   Phosphorus    Collection Time: 07/24/18  3:23 AM   Result Value Ref Range    Phosphorus 3.3 2.5 - 4.5 mg/dL   POC Glucose Fingerstick    Collection Time: 07/24/18  3:36 AM   Result Value Ref Range    POC Glucose 236 (H) 75 - 110 mg/dL   POC Glucose Fingerstick    Collection Time: 07/24/18  5:07 AM   Result Value Ref Range    POC Glucose 344 (H) 75 - 110 mg/dL   POC Glucose Fingerstick    Collection Time: 07/24/18  5:58 AM   Result Value Ref Range    POC Glucose 319 (H) 75 - 110 mg/dL   POC Glucose Fingerstick    Collection Time: 07/24/18  7:20 AM   Result Value Ref Range    POC Glucose 329 (H) 75 - 110 mg/dL   Basic Metabolic Panel    Collection Time:

## 2018-07-24 NOTE — PROGRESS NOTES
BRONCHOSPASM/BRONCHOCONSTRICTION     [x]         IMPROVE AERATION/BREATH SOUNDS  [x]   ADMINISTER BRONCHODILATOR THERAPY AS APPROPRIATE  [x]   ASSESS BREATH SOUNDS  [x]   PATIENT EDUCATION AS NEEDED

## 2018-07-24 NOTE — ED NOTES
at 0130, now 356 at 0230. Dr Anjali Domínguez informed.   Dr. Anjali Domínguez stated to turn off insulin randy Vega, RN  07/24/18 2136

## 2018-07-24 NOTE — ED PROVIDER NOTES
[facility]  Emergency Department  Emergency Medicine Resident Sign-out     Care of Mónica Townsend was assumed from Dr. Patience Fry and is being seen for Blood Sugar Problem  . The patient's initial evaluation and plan have been discussed with the prior provider who initially evaluated the patient.      EMERGENCY DEPARTMENT COURSE / MEDICAL DECISION MAKING:       MEDICATIONS GIVEN:  Orders Placed This Encounter   Medications    0.9 % sodium chloride bolus    dextrose 50 % solution 12.5 g    potassium chloride 10 mEq/100 mL IVPB (Peripheral Line)    magnesium sulfate 1 g in dextrose 5% 100 mL IVPB    OR Linked Order Group     sodium phosphate 10 mmol in dextrose 5 % 250 mL IVPB     sodium phosphate 15 mmol in dextrose 5 % 250 mL IVPB     sodium phosphate 20 mmol in dextrose 5 % 500 mL IVPB    dextrose 5 % and 0.45 % sodium chloride infusion    FOLLOWED BY Linked Order Group     0.9 % sodium chloride bolus     0.9 % sodium chloride infusion    glucose (GLUTOSE) 40 % oral gel 15 g    dextrose 50 % solution 12.5 g    glucagon (rDNA) injection 1 mg    dextrose 5 % solution    DISCONTD: insulin regular (HUMULIN R;NOVOLIN R) 100 Units in sodium chloride 0.9 % 100 mL infusion    DISCONTD: lactated ringers bolus    lactated ringers infusion    insulin lispro (HUMALOG) injection vial 0-6 Units    insulin lispro (HUMALOG) injection vial 0-3 Units       LABS / RADIOLOGY:     Labs Reviewed   BASIC METABOLIC PANEL - Abnormal; Notable for the following:        Result Value    Glucose 1,005 (*)     Sodium 119 (*)     Chloride 85 (*)     CO2 18 (*)     All other components within normal limits   BETA-HYDROXYBUTYRATE - Abnormal; Notable for the following:     Beta-Hydroxybutyrate 2.27 (*)     All other components within normal limits   URINALYSIS - Abnormal; Notable for the following:     Glucose, Ur 3+ (*)     Ketones, Urine SMALL (*)     All other components within normal limits   CBC WITH MAGNESIUM   PHOSPHORUS   POC BLOOD GAS AND CHEMISTRY   CREATININE W/GFR POINT OF CARE   LACTIC ACID,POINT OF CARE   ANION GAP (CALC) POC   POC GLUCOSE FINGERSTICK   POCT GLUCOSE   POCT GLUCOSE   POCT GLUCOSE   POCT GLUCOSE   POCT GLUCOSE   POCT GLUCOSE   POCT GLUCOSE   POCT GLUCOSE   POCT GLUCOSE   POCT GLUCOSE   POCT GLUCOSE   POCT GLUCOSE   POCT GLUCOSE   POCT GLUCOSE       No results found. RECENT VITALS:     Temp: 97.7 °F (36.5 °C),  Pulse: 66, Resp: 16, BP: 128/70, SpO2: 96 %    This patient is a 33 y.o. Male with polydypsia, polyuria, weight loss, glucose over 1000, no DM hx, no acidosis, nontoxic, recent polysubstance abuse, given IVF, started on insulin drip, insulin down to 300, given food and gap closed, started on low dose sliding scale, admit to Intermed stepdown. OUTSTANDING TASKS / RECOMMENDATIONS:    1. Monitor while in ED - no acute events     FINAL IMPRESSION:     1.  Diabetic ketoacidosis without coma associated with type 2 diabetes mellitus ([facility]        DISPOSITION:         DISPOSITION:  []  Discharge   []  Transfer -    [x]  Admission - Intermed    []  Against Medical Advice   []  Eloped   FOLLOW-UP: Herman Mcrae MD  [address]  [phone]           DISCHARGE MEDICATIONS: New Prescriptions    No medications on file          Micaela Zamarripa MD  Emergency Medicine Resident  [facility]        Micaela Zamarripa MD  Resident  07/24/18 0781

## 2018-07-24 NOTE — ED NOTES
Pt to ED with c/o of high blood sugar. Pt has been very thirsty, feeling sluggish and urinating frequently over the past week. Pt states that his daughter is a Type 1 Diabetic and that he wanted to check his BS on her glucometer about 1 hour PTA. Pt BS read high, with the high limit being 600. Pt also tested his urine for ketones and he was + for ketones. Pt is very anxious and asks for water many times in triage. Otherwise, NAD noted.  Pt is A&O x4     Scotty Johnson RN  07/23/18 8001

## 2018-07-24 NOTE — ED PROVIDER NOTES
[address] [facility]     Emergency Department     Faculty Attestation    I performed a history and physical examination of the patient and discussed management with the resident. I reviewed the residents note and agree with the documented findings and plan of care. Any areas of disagreement are noted on the chart. I was personally present for the key portions of any procedures. I have documented in the chart those procedures where I was not present during the key portions. I have reviewed the emergency nurses triage note. I agree with the chief complaint, past medical history, past surgical history, allergies, medications, social and family history as documented unless otherwise noted below. Documentation of the HPI, Physical Exam and Medical Decision Making performed by medical students or scribes is based on my personal performance of the HPI, PE and MDM. For Midlevel providers: I have personally seen and evaluated the patient. I find the patient's history and physical exam are consistent with the NP/PA documentation. I agree with the care provided, treatment rendered, disposition and follow-up plan      Patient with severely elevated glucose. Patient is normal mental status. Patient has a daughter who is diabetic and was able to diagnose himself with checking his own sugar on her glucometer. Patient also checked for ketones on a urine dipstick. Patient presented with symptoms of severe thirst, fatigue, and excessive urination. We'll start with rehydration. We will consider adding insulation after glucose check after hydration initially. Goal for 100 per hour a glucose reduction. Will admit patient for new onset diabetes and hyperosmolar state.       Critical Care          MD Ubaldo Hicks MD  07/23/18 [id number]

## 2018-07-24 NOTE — ED PROVIDER NOTES
[address] [facility]  Emergency Department  Emergency Medicine Resident Sign-out     Care of Edison Serrano was assumed from  [place], [place] and is being seen for Blood Sugar Problem  . The patient's initial evaluation and plan have been discussed with the prior provider who initially evaluated the patient.      EMERGENCY DEPARTMENT COURSE / MEDICAL DECISION MAKING:       MEDICATIONS GIVEN:  Orders Placed This Encounter   Medications    0.9 % sodium chloride bolus    dextrose 50 % solution 12.5 g    potassium chloride 10 mEq/100 mL IVPB (Peripheral Line)    magnesium sulfate 1 g in dextrose 5% 100 mL IVPB    OR Linked Order Group     sodium phosphate 10 mmol in dextrose 5 % 250 mL IVPB     sodium phosphate 15 mmol in dextrose 5 % 250 mL IVPB     sodium phosphate 20 mmol in dextrose 5 % 500 mL IVPB    dextrose 5 % and 0.45 % sodium chloride infusion    FOLLOWED BY Linked Order Group     0.9 % sodium chloride bolus     0.9 % sodium chloride infusion    glucose (GLUTOSE) 40 % oral gel 15 g    dextrose 50 % solution 12.5 g    glucagon (rDNA) injection 1 mg    dextrose 5 % solution    DISCONTD: insulin regular (HUMULIN R;NOVOLIN R) 100 Units in sodium chloride 0.9 % 100 mL infusion    DISCONTD: lactated ringers bolus    lactated ringers infusion    insulin lispro (HUMALOG) injection vial 0-6 Units    insulin lispro (HUMALOG) injection vial 0-3 Units       LABS / RADIOLOGY:     Labs Reviewed   BASIC METABOLIC PANEL - Abnormal; Notable for the following:        Result Value    Glucose 1,005 (*)     Sodium 119 (*)     Chloride 85 (*)     CO2 18 (*)     All other components within normal limits   BETA-HYDROXYBUTYRATE - Abnormal; Notable for the following:     Beta-Hydroxybutyrate 2.27 (*)     All other components within normal limits   URINALYSIS - Abnormal; Notable for the following:     Glucose, Ur 3+ (*)     Ketones, Urine SMALL (*)     All other components within normal limits   CBC

## 2018-07-24 NOTE — PLAN OF CARE
Problem: Serum Glucose Level - Abnormal  Goal: Ability to maintain appropriate glucose levels will improve  Ability to maintain appropriate glucose levels will improve     Outcome: Ongoing  Blood sugars remain high. Insulin orders are still being adjusted. Blood sugar checks done ACHS    Problem: Musculor/Skeletal Functional Status  Goal: Highest potential functional level  Outcome: Ongoing  Pt able to reposition self frequently. Pt stands at side of bed to void. Problem: Pain:  Goal: Pain level will decrease  Pain level will decrease   Outcome: Ongoing  Pt does not report any pain.  Medications ordered as needed

## 2018-07-24 NOTE — PROGRESS NOTES
Physical Therapy    Facility/Department: [facility]  Initial Assessment    NAME: Mirna Contreras  : 1990  MRN: [id number]    Chief Complaint   Patient presents with    Blood Sugar Problem     Date of Service: 2018    Discharge Recommendations:  Home with assist PRN (family support)   PT Equipment Recommendations  Equipment Needed: Yes  Mobility Devices: Durel Baker: Rolling    Patient Diagnosis(es): The encounter diagnosis was Diabetic ketoacidosis without coma associated with type 2 diabetes mellitus ([facility] [address] has a past medical history of Asthma; Carpal tunnel syndrome, bilateral; and Chronic back pain. has a past surgical history that includes Neck surgery (N/A, year of ). Restrictions  Restrictions/Precautions  Restrictions/Precautions: Fall Risk  Required Braces or Orthoses?: No  Position Activity Restriction  Other position/activity restrictions: Up with assist as tolerated  Vision/Hearing        Subjective  General  Patient assessed for rehabilitation services?: Yes  Response To Previous Treatment: Not applicable  Family / Caregiver Present: Yes (wife was present)  Follows Commands: Within Functional Limits  Subjective  Subjective: Pt on the toliet upon entering room. Pt and RN agreeable to treatment. Pain Screening  Patient Currently in Pain: Yes  Pain Assessment  Pain Assessment: 0-10  Pain Level: 8  Pain Type: Acute pain  Pain Location: Leg  Pain Orientation: Right  Pain Descriptors: Numbness; Aching;Cramping  Pain Intervention(s): Repositioned; Ambulation/Increased activity; Emotional support;Distraction  Response to Pain Intervention: Patient Satisfied  Vital Signs  Patient Currently in Pain: Yes  Pre Treatment Pain Screening  Intervention List: Patient able to continue with treatment    Orientation  Orientation  Overall Orientation Status: Within Normal Limits    Social/Functional History  Social/Functional History  Lives With: Family  Type of Home: House  Home Layout: Two level,

## 2018-07-25 LAB
ANION GAP SERPL CALCULATED.3IONS-SCNC: 13 MMOL/L (ref 9–17)
BUN BLDV-MCNC: 12 MG/DL (ref 6–20)
BUN/CREAT BLD: ABNORMAL (ref 9–20)
CALCIUM SERPL-MCNC: 7.7 MG/DL (ref 8.6–10.4)
CHLORIDE BLD-SCNC: 104 MMOL/L (ref 98–107)
CO2: 21 MMOL/L (ref 20–31)
CREAT SERPL-MCNC: 0.58 MG/DL (ref 0.7–1.2)
ESTIMATED AVERAGE GLUCOSE: NORMAL MG/DL
GFR AFRICAN AMERICAN: >60 ML/MIN
GFR NON-AFRICAN AMERICAN: >60 ML/MIN
GFR SERPL CREATININE-BSD FRML MDRD: ABNORMAL ML/MIN/{1.73_M2}
GFR SERPL CREATININE-BSD FRML MDRD: ABNORMAL ML/MIN/{1.73_M2}
GLUCOSE BLD-MCNC: 170 MG/DL (ref 70–99)
GLUCOSE BLD-MCNC: 179 MG/DL (ref 75–110)
GLUCOSE BLD-MCNC: 183 MG/DL (ref 75–110)
GLUCOSE BLD-MCNC: 203 MG/DL (ref 75–110)
GLUCOSE BLD-MCNC: 286 MG/DL (ref 75–110)
GLUCOSE BLD-MCNC: 321 MG/DL (ref 75–110)
GLUCOSE BLD-MCNC: 330 MG/DL (ref 75–110)
GLUCOSE BLD-MCNC: 90 MG/DL (ref 75–110)
HBA1C MFR BLD: NORMAL % (ref 4–6)
HCT VFR BLD CALC: 38.6 % (ref 40.7–50.3)
HEMOGLOBIN: 13 G/DL (ref 13–17)
MAGNESIUM: 1.6 MG/DL (ref 1.6–2.6)
MCH RBC QN AUTO: 29.9 PG (ref 25.2–33.5)
MCHC RBC AUTO-ENTMCNC: 33.7 G/DL (ref 28.4–34.8)
MCV RBC AUTO: 88.7 FL (ref 82.6–102.9)
NRBC AUTOMATED: 0 PER 100 WBC
PDW BLD-RTO: 12 % (ref 11.8–14.4)
PHOSPHORUS: 4.7 MG/DL (ref 2.5–4.5)
PLATELET # BLD: 141 K/UL (ref 138–453)
PMV BLD AUTO: 12.1 FL (ref 8.1–13.5)
POTASSIUM SERPL-SCNC: 3.6 MMOL/L (ref 3.7–5.3)
RBC # BLD: 4.35 M/UL (ref 4.21–5.77)
SODIUM BLD-SCNC: 138 MMOL/L (ref 135–144)
WBC # BLD: 5.9 K/UL (ref 3.5–11.3)

## 2018-07-25 PROCEDURE — 80048 BASIC METABOLIC PNL TOTAL CA: CPT

## 2018-07-25 PROCEDURE — 2580000003 HC RX 258: Performed by: INTERNAL MEDICINE

## 2018-07-25 PROCEDURE — 36415 COLL VENOUS BLD VENIPUNCTURE: CPT

## 2018-07-25 PROCEDURE — 6370000000 HC RX 637 (ALT 250 FOR IP): Performed by: INTERNAL MEDICINE

## 2018-07-25 PROCEDURE — 2060000000 HC ICU INTERMEDIATE R&B

## 2018-07-25 PROCEDURE — G0108 DIAB MANAGE TRN  PER INDIV: HCPCS

## 2018-07-25 PROCEDURE — 84100 ASSAY OF PHOSPHORUS: CPT

## 2018-07-25 PROCEDURE — 6360000002 HC RX W HCPCS: Performed by: NURSE PRACTITIONER

## 2018-07-25 PROCEDURE — 85027 COMPLETE CBC AUTOMATED: CPT

## 2018-07-25 PROCEDURE — 99233 SBSQ HOSP IP/OBS HIGH 50: CPT | Performed by: INTERNAL MEDICINE

## 2018-07-25 PROCEDURE — 94640 AIRWAY INHALATION TREATMENT: CPT

## 2018-07-25 PROCEDURE — 83735 ASSAY OF MAGNESIUM: CPT

## 2018-07-25 PROCEDURE — 82947 ASSAY GLUCOSE BLOOD QUANT: CPT

## 2018-07-25 RX ORDER — LISINOPRIL 2.5 MG/1
2.5 TABLET ORAL DAILY
Qty: 30 TABLET | Refills: 3 | Status: ON HOLD | OUTPATIENT
Start: 2018-07-25 | End: 2020-09-07

## 2018-07-25 RX ORDER — ATORVASTATIN CALCIUM 20 MG/1
20 TABLET, FILM COATED ORAL NIGHTLY
Status: DISCONTINUED | OUTPATIENT
Start: 2018-07-25 | End: 2018-07-26 | Stop reason: HOSPADM

## 2018-07-25 RX ORDER — ASPIRIN 81 MG/1
81 TABLET, CHEWABLE ORAL DAILY
Qty: 30 TABLET | Refills: 3 | Status: SHIPPED | OUTPATIENT
Start: 2018-07-25

## 2018-07-25 RX ORDER — ASPIRIN 81 MG/1
81 TABLET, CHEWABLE ORAL DAILY
Status: DISCONTINUED | OUTPATIENT
Start: 2018-07-25 | End: 2018-07-26 | Stop reason: HOSPADM

## 2018-07-25 RX ORDER — ATORVASTATIN CALCIUM 20 MG/1
20 TABLET, FILM COATED ORAL NIGHTLY
Qty: 30 TABLET | Refills: 3 | Status: ON HOLD | OUTPATIENT
Start: 2018-07-25 | End: 2021-03-20

## 2018-07-25 RX ORDER — LISINOPRIL 2.5 MG/1
2.5 TABLET ORAL DAILY
Status: DISCONTINUED | OUTPATIENT
Start: 2018-07-25 | End: 2018-07-26 | Stop reason: HOSPADM

## 2018-07-25 RX ADMIN — MOMETASONE FUROATE AND FORMOTEROL FUMARATE DIHYDRATE 2 PUFF: 200; 5 AEROSOL RESPIRATORY (INHALATION) at 08:54

## 2018-07-25 RX ADMIN — INSULIN LISPRO 3 UNITS: 100 INJECTION, SOLUTION INTRAVENOUS; SUBCUTANEOUS at 19:03

## 2018-07-25 RX ADMIN — ATORVASTATIN CALCIUM 20 MG: 20 TABLET, FILM COATED ORAL at 21:02

## 2018-07-25 RX ADMIN — INSULIN GLARGINE 15 UNITS: 100 INJECTION, SOLUTION SUBCUTANEOUS at 09:06

## 2018-07-25 RX ADMIN — ASPIRIN 81 MG: 81 TABLET, CHEWABLE ORAL at 15:46

## 2018-07-25 RX ADMIN — INSULIN LISPRO 6 UNITS: 100 INJECTION, SOLUTION INTRAVENOUS; SUBCUTANEOUS at 09:05

## 2018-07-25 RX ADMIN — ENOXAPARIN SODIUM 40 MG: 40 INJECTION SUBCUTANEOUS at 09:04

## 2018-07-25 RX ADMIN — INSULIN LISPRO 6 UNITS: 100 INJECTION, SOLUTION INTRAVENOUS; SUBCUTANEOUS at 21:12

## 2018-07-25 RX ADMIN — SODIUM CHLORIDE: 9 INJECTION, SOLUTION INTRAVENOUS at 07:56

## 2018-07-25 RX ADMIN — MOMETASONE FUROATE AND FORMOTEROL FUMARATE DIHYDRATE 2 PUFF: 200; 5 AEROSOL RESPIRATORY (INHALATION) at 20:58

## 2018-07-25 RX ADMIN — LISINOPRIL 2.5 MG: 2.5 TABLET ORAL at 15:47

## 2018-07-25 RX ADMIN — INSULIN LISPRO 9 UNITS: 100 INJECTION, SOLUTION INTRAVENOUS; SUBCUTANEOUS at 13:19

## 2018-07-25 ASSESSMENT — PAIN SCALES - GENERAL
PAINLEVEL_OUTOF10: 0

## 2018-07-25 NOTE — PLAN OF CARE
Problem: Serum Glucose Level - Abnormal  Goal: Ability to maintain appropriate glucose levels will improve  Ability to maintain appropriate glucose levels will improve     Outcome: Ongoing  Blood sugars are still elevated. Improving but still abnormal. Insulin given per orders. Diabetes education completed    Problem: Musculor/Skeletal Functional Status  Goal: Highest potential functional level  Outcome: Ongoing  Pt ambulating at highest potential function. Pt walks off floor frequently without assistance    Problem: Pain:  Goal: Pain level will decrease  Pain level will decrease   Outcome: Ongoing  Pt reports no pain at this time.  Will continue to monitor

## 2018-07-25 NOTE — DISCHARGE SUMMARY
educator has given sliding scale and diet instructions as well as carb counting to the patient  Could not go home yesterday since insurance did not approve his Levemir insulin  Prescription fo Basaglar insulin to 20 units daily written which is covered by his insurance   Primary Problem  Diabetic ketoacidosis without coma associated with type 2 diabetes mellitus (HCC)-improved    Active Hospital Problems    Diagnosis Date Noted    Diabetic ketoacidosis without coma associated with type 2 diabetes mellitus ([facility] [address] [E13.10] 07/24/2018     Priority: High    Metabolic acidosis [T41.1] 07/24/2018     Priority: High    Asthma [J45.909] 03/21/2013    Carpal tunnel syndrome, bilateral [G56.03] 03/21/2013    Chronic back pain [M54.9, G89.29] 03/21/2013       Plan:        1. Continue basaglar Insulin 20 units daily  2. Continue high intensity insulin sliding scale  3. Continue ACE inhibitors, statins and 81 mg of aspirin  4. Diabetic education already given  5.  Discharge home today          Significant therapeutic interventions: See above notes    Significant Diagnostic Studies:   Labs / Micro:  CBC:   Lab Results   Component Value Date    WBC 5.9 07/25/2018    RBC 4.35 07/25/2018    HGB 13.0 07/25/2018    HCT 38.6 07/25/2018    MCV 88.7 07/25/2018    MCH 29.9 07/25/2018    MCHC 33.7 07/25/2018    RDW 12.0 07/25/2018     07/25/2018     BMP:    Lab Results   Component Value Date    GLUCOSE 170 07/25/2018     07/25/2018    K 3.6 07/25/2018     07/25/2018    CO2 21 07/25/2018    ANIONGAP 13 07/25/2018    BUN 12 07/25/2018    CREATININE 0.58 07/25/2018    BUNCRER NOT REPORTED 07/25/2018    CALCIUM 7.7 07/25/2018    LABGLOM >60 07/25/2018    GFRAA >60 07/25/2018    GFR      07/25/2018    GFR NOT REPORTED 07/25/2018     HFP:    Lab Results   Component Value Date    PROT 6.2 03/27/2013     CMP:    Lab Results   Component Value Date    GLUCOSE 170 07/25/2018     07/25/2018    K 3.6 07/25/2018

## 2018-07-25 NOTE — PROGRESS NOTES
Mark Herman,  Seen for evaluation and education on new diagnosis of DM, unsure at this time if T2DM or T1DM    Lab Results   Component Value Date    LABA1C 8.3 (H) 07/24/2018     Lab Results   Component Value Date     07/24/2018       Discussed with Vane Chaney new Diagnosis of DM. PAtient has knowledge of T1DM as daughter has T1Dm on insulin pump therapy. Patient was having increased urination and thirst and checked BG which was HI on meter and patient knew it was >600. He did check for ketones at home which showed moderate. He denies any vomiting but was having some stomach pain so smoked some pot to help. Patient had a lot of questions and felt that no one was answering them until just recent after visit with Dr. Margret Markham. Patient feels that he is not getting enough insulin as BG remain elevated and does not understand why they do not cover CHO eaten as he runs high after eating. Following Survival Skills education topics were covered as appropriate to patient plan of for care:  __x_ Type 2 Diabetes diagnosis as chronic and progressive  Discussed difference between T1DM and T2 DM and patient informed that he should follow with Endo to help determine that Dx as they can draw insulin antibodies. _x__ Healthy eating - CHO food groups and need for CHO controlled diet. Elimination of sugary beverages, avoid skipping meal  Patient was drinking a lot of Reg pop and will eliminated form diet now. Patient states that they were giving him 5 CHO choices per meal here in the hospital but told Doctor he would like to cut down to 4 CHO choices. Patient very educated on CHO as they count CHO grams for daughter's pump. 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       _x__ Blood Glucose Self-Monitoring ( BGSM)  Amanuel Kelley to use a BG meter - Provided and demonstrated use of Contour Next meter for education and practice of BGSM skill - this is provided free of charge. PAtient will need to get the TrueMetrix meter with Steven Kline. _x__ Blood sugar targets Pre meal 80 - 130 and 2 hours post meal < 180    _x__ Hyperglycemia  ( BG over 250) signs and symptoms and treatment    __ Sick Day Care    _x__ Hypoglycemia( BG < than 70) signs and symptoms and treatment \"Rule of 15\"    _x__ Keep BG log and take log and meter to follow up HCP appointments    _x__ Medications -  Insulin Lantus - Basal ( long acting) / Humalog-  Bolus (pre meal)  regime - insulin action times. __x_ Importance of dosing  pre meal rapid insulin from BG result at time of start of  meal & administering  within 15 minutes of eating meals  Patient given copy of high ISS but patient wants to cover CHO eaten so  Approved to cover 1 unit for 15 grams CHO at mealtime. __x_ Importance of taking long acting insulin at same time each day and not to skip doses  Patient thought he needed to take the Lantus at bedtime but since he has been getting it am to continue for now.  Did just increase the dose to 20 units to start tomorrow for home. _x__ Demonstration of self-administering insulin via Pen and pen needle tips   _x__ Reinforce safe needle / sharps disposal    _x__ Insulin injection site rotation  _x__ Medications - Orals NO oraL meds such as Metformin for DM at this time.    Talked about adding meds for kidney protection and cholesterol lowereing and baby Aspirin    Following Support materials were provided for patient to take home:  _x__ Educational Booklet \"Diabetes Leaving the Hospital\"  _x__ Be safe with [place] teaching sheet from [employer]  _x__\"Type 2 Diabetes and Adding Insulin\" take home kit with survival skills fold out, self care diary for blood glucose and food, Diabetes ID card  _x__ [id number] [facility] Diabetes Education brochure/ contact card      Patient verbalizes, demonstrates and needs reinforcement. understanding  of survival skills education. RECOMMENDATIONS INPATIENT PLAN:  ___ Dietician Consult this admission for education on CHO diet and diet plan for home  DONE    RN Bedside Support Plan:  _x__ Bedside RN to support patient with administration of insulin at bedside  _x__ Bedside RN to support patient with SMBG - - Reinforce target BG ranges, Hyper and Hypo signs and symptoms and treatment and how to use a home BG meter  x__ Bedside RN to coordinate BG Check with mealtime and insulin  x__ Bedside RN to ensure snack at HS for patient on HS correction scale insulin  x_ Bedside RN to reinforce survival skills education and diabetes self care    ___ set up patient to watch Education TV Channels [facility]'s Channels 76 and 68 at 9am, 3pm,  7pm, 9pm)         RECOMMENDATIONS FOR OUTPATIENT PLAN:  Diabetes Self-Monitoring Supplies:  __x_ Preferred / formulary blood glucose meter for BGSM at home use    _x_ Strips and lancets for 4  frequency of home BGSM    Diabetes Medications:  _x__ Insulin Pen o  Basal / long acting - dose per MD   _x__ Insulin Pen  Bolus pre meal set dose  or correction scale - dose per MD  _x__ Insulin Delivery method - Pens -   order Insulin Pen Needle 31G X 4 mm MISC      Diabetes Education / HCP follow -up : Patient will call with questions, information given. __ Would recommend follow -up education at outpatient diabetes education at [place] [id number]. An ordered is needed for this service and can be placed via EHR. Discharge Navigator --- Med Reconciliation -- New orders for discharge tab -- search diabetic ed - REF20 -  [facility]  - review and sign     _x__ Follow -up with HCP / PCP within one week. Patient wants to establish with Adult Endo ASAP and will call over to [facility] where daughter goes.     Pietro Marcial, RN

## 2018-07-25 NOTE — PROGRESS NOTES
headache    Medications: Allergies: Allergies   Allergen Reactions    Bee Venom Swelling    Penicillins Hives       Current Meds:   Scheduled Meds:    mometasone-formoterol  2 puff Inhalation BID    enoxaparin  40 mg Subcutaneous Daily    insulin lispro  0-18 Units Subcutaneous TID WC    insulin lispro  0-9 Units Subcutaneous Nightly    insulin glargine  15 Units Subcutaneous Daily with breakfast     Continuous Infusions:    dextrose      dextrose 5 % and 0.45 % NaCl      sodium chloride 150 mL/hr at 18 0756     PRN Meds: dextrose, potassium chloride, magnesium sulfate, sodium phosphate IVPB **OR** sodium phosphate IVPB **OR** sodium phosphate IVPB, glucose, dextrose, glucagon (rDNA), dextrose, dextrose 5 % and 0.45 % NaCl, dextrose, potassium chloride, magnesium sulfate, naproxen    Data:     Past Medical History:   has a past medical history of Asthma; Carpal tunnel syndrome, bilateral; and Chronic back pain. Social History:   reports that he has been smoking Cigarettes. He has a 4.00 pack-year smoking history. He has never used smokeless tobacco. He reports that he drinks alcohol. Family History:   Family History   Problem Relation Age of Onset   Brenda Dighton Schizophrenia Mother     Glaucoma Mother     Asthma Mother        Vitals:  BP (!) 148/92   Pulse 58   Temp 97.7 °F (36.5 °C) (Oral)   Resp 15   Ht 5' 9\" (1.753 m)   Wt 215 lb (97.5 kg)   SpO2 96%   BMI 31.75 kg/m²   Temp (24hrs), Av.7 °F (36.5 °C), Min:97.7 °F (36.5 °C), Max:97.7 °F (36.5 °C)    Recent Labs      18   2100  18   0014  18   0547  18   0717   POCGLU  287*  90  179*  203*       I/O (24Hr):     Intake/Output Summary (Last 24 hours) at 18 1156  Last data filed at 18 0626   Gross per 24 hour   Intake             3221 ml   Output             1200 ml   Net             2021 ml       Labs:    Hematology:  Recent Labs      18   2237  18   0540   WBC  5.7  5.9   HGB  13.0 progress  5. Discharge home after above      Hedy Bourne was evaluated today and a DME order was entered for a glucometer and diabetic testing supplies because he requires this for treatment of diabetes mellitus. The patient has been sufficiently trained to use the device and he has skills to be able to use this device as intended. This patient is  requiring the use of insulin for diabetic control. The patient will require testing 4 times per day to achieve optimal diabetic control. Patient has Not applicable. The need for this equipment was discussed with the patient and he understands and is in agreement. Time spent more than 40 minutes in discussing with patient the importance of diet control, need to stay on insulin at this point, importance of starting ACE inhibitors statins and aspirin.   Discussed that he needs to see endocrinologist as outpatient to further streamline the treatment    Calvin Zamora MD  7/25/2018  11:56 AM

## 2018-07-25 NOTE — PROGRESS NOTES
[facility]  Occupational Therapy Not Seen Note    DATE: 2018  Name: Susan Mojica  : 1990  MRN: [id number]    Patient not available for Occupational Therapy due to:    [] Testing:    [] Hemodialysis    [] Blood Transfusion in Progress    []Refusal by Patient:    [] Surgery/Procedure:    [] Strict Bedrest    [] Sedation    [] Spine Precautions     [] Pt being transferred to palliative care at this time. Spoke with pt/family and OT services to be defered. [x] Pt independent with functional mobility and functional tasks per RN [facility].  Pt with no OT acute care needs at this time, will defer OT eval. Per RN, pt walking off unit, no further acute care OT needs     [] Other    Next Scheduled Treatment: Defer OT evaluation     Signature: BLAYNE Durant/KRUNAL

## 2018-07-26 VITALS
SYSTOLIC BLOOD PRESSURE: 132 MMHG | TEMPERATURE: 97.9 F | OXYGEN SATURATION: 100 % | RESPIRATION RATE: 15 BRPM | HEART RATE: 67 BPM | DIASTOLIC BLOOD PRESSURE: 71 MMHG | HEIGHT: 69 IN | BODY MASS INDEX: 31.84 KG/M2 | WEIGHT: 215 LBS

## 2018-07-26 LAB
GLUCOSE BLD-MCNC: 224 MG/DL (ref 75–110)
GLUCOSE BLD-MCNC: 237 MG/DL (ref 75–110)

## 2018-07-26 PROCEDURE — 82947 ASSAY GLUCOSE BLOOD QUANT: CPT

## 2018-07-26 PROCEDURE — 6360000002 HC RX W HCPCS: Performed by: NURSE PRACTITIONER

## 2018-07-26 PROCEDURE — 94640 AIRWAY INHALATION TREATMENT: CPT

## 2018-07-26 PROCEDURE — 6370000000 HC RX 637 (ALT 250 FOR IP): Performed by: INTERNAL MEDICINE

## 2018-07-26 PROCEDURE — 99232 SBSQ HOSP IP/OBS MODERATE 35: CPT | Performed by: INTERNAL MEDICINE

## 2018-07-26 RX ORDER — INSULIN GLARGINE 100 [IU]/ML
20 INJECTION, SOLUTION SUBCUTANEOUS
Status: DISCONTINUED | OUTPATIENT
Start: 2018-07-26 | End: 2018-07-26 | Stop reason: HOSPADM

## 2018-07-26 RX ADMIN — ENOXAPARIN SODIUM 40 MG: 40 INJECTION SUBCUTANEOUS at 09:54

## 2018-07-26 RX ADMIN — LISINOPRIL 2.5 MG: 2.5 TABLET ORAL at 09:54

## 2018-07-26 RX ADMIN — MOMETASONE FUROATE AND FORMOTEROL FUMARATE DIHYDRATE 2 PUFF: 200; 5 AEROSOL RESPIRATORY (INHALATION) at 07:41

## 2018-07-26 RX ADMIN — ASPIRIN 81 MG: 81 TABLET, CHEWABLE ORAL at 09:54

## 2018-07-26 RX ADMIN — INSULIN GLARGINE 20 UNITS: 100 INJECTION, SOLUTION SUBCUTANEOUS at 09:55

## 2018-07-26 RX ADMIN — INSULIN LISPRO 6 UNITS: 100 INJECTION, SOLUTION INTRAVENOUS; SUBCUTANEOUS at 14:00

## 2018-07-26 RX ADMIN — INSULIN LISPRO 6 UNITS: 100 INJECTION, SOLUTION INTRAVENOUS; SUBCUTANEOUS at 09:55

## 2018-07-26 NOTE — PROGRESS NOTES
diarrhea, nausea, vomiting  Neurological:  negative for dizziness, headache    Medications: Allergies: Allergies   Allergen Reactions    Bee Venom Swelling    Penicillins Hives       Current Meds:   Scheduled Meds:    insulin glargine  20 Units Subcutaneous Daily with breakfast    lisinopril  2.5 mg Oral Daily    atorvastatin  20 mg Oral Nightly    aspirin  81 mg Oral Daily    mometasone-formoterol  2 puff Inhalation BID    enoxaparin  40 mg Subcutaneous Daily    insulin lispro  0-18 Units Subcutaneous TID WC    insulin lispro  0-9 Units Subcutaneous Nightly     Continuous Infusions:    dextrose      dextrose 5 % and 0.45 % NaCl       PRN Meds: dextrose, potassium chloride, magnesium sulfate, sodium phosphate IVPB **OR** sodium phosphate IVPB **OR** sodium phosphate IVPB, glucose, dextrose, glucagon (rDNA), dextrose, dextrose 5 % and 0.45 % NaCl, dextrose, potassium chloride, magnesium sulfate, naproxen    Data:     Past Medical History:   has a past medical history of Asthma; Carpal tunnel syndrome, bilateral; and Chronic back pain. Social History:   reports that he has been smoking Cigarettes. He has a 4.00 pack-year smoking history. He has never used smokeless tobacco. He reports that he drinks alcohol. Family History:   Family History   Problem Relation Age of Onset   Azul Guzman Schizophrenia Mother     Glaucoma Mother     Asthma Mother        Vitals:  BP (!) 140/77   Pulse 67   Temp 97.7 °F (36.5 °C) (Oral)   Resp 15   Ht 5' 9\" (1.753 m)   Wt 215 lb (97.5 kg)   SpO2 100%   BMI 31.75 kg/m²   Temp (24hrs), Av.7 °F (36.5 °C), Min:97.7 °F (36.5 °C), Max:97.9 °F (36.6 °C)    Recent Labs      18   1706  18   2108  18   2126  18   0835   POCGLU  183*  321*  330*  224*       I/O (24Hr):   No intake or output data in the 24 hours ending 18 1141    Labs:    Hematology:  Recent Labs      18   2237  18   0540   WBC  5.7  5.9   HGB  13.0  13.0   HCT 39.8*  38.6*   PLT  140  141     Chemistry:  Recent Labs      07/24/18   0323  07/24/18   0724  07/24/18   1352  07/25/18   0540   NA  136  135  137  138   K  3.4*  3.8  3.6*  3.6*   CL  99  99  100  104   CO2  22  21  20  21   GLUCOSE  234*  331*  241*  170*   BUN  13  11  12  12   CREATININE  0.73  0.75  0.70  0.58*   MG  1.9  1.9   --   1.6   ANIONGAP  15  15  17  13   LABGLOM  >60  >60  >60  >60   GFRAA  >60  >60  >60  >60   CALCIUM  8.3*  8.3*  8.6  7.7*   PHOS  3.3  4.1   --   4.7*     Recent Labs      07/24/18   0016  07/24/18   0724   LABA1C  Hemoglobin variant present, sample sent to reference laboratory. 8.3*   LIPASE  34   --          No results found for: SPECIAL  No results found for: CULTURE    Lab Results   Component Value Date    FIO2 NOT REPORTED 07/23/2018       Radiology:    x    Physical Examination:        General appearance:  alert, cooperative and no distress  Mental Status:  oriented to person, place and time and normal affect  Lungs:  clear to auscultation bilaterally, normal effort  Heart:  regular rate and rhythm, no murmur  Abdomen:  soft, nontender, nondistended, normal bowel sounds, no masses, hepatomegaly, splenomegaly  Extremities:  no edema, redness, tenderness in the calves  Skin:  no gross lesions, rashes, induration    Assessment:        Primary Problem  Diabetic ketoacidosis without coma associated with type 2 diabetes mellitus (HCC)-improved    Active Hospital Problems    Diagnosis Date Noted    Diabetic ketoacidosis without coma associated with type 2 diabetes [facility] ([facility] [E13.10] 07/24/2018     Priority: High    Metabolic acidosis [C53.8] 07/24/2018     Priority: High    Asthma [J45.909] 03/21/2013    Carpal tunnel syndrome, bilateral [G56.03] 03/21/2013    Chronic back pain [M54.9, G89.29] 03/21/2013       Plan:        1. Prescription fo Basaglar insulin to 20 units daily written which is covered by his insurance   2.  Continue high intensity insulin sliding scale at home along with carb counting  3. Start ACE inhibitors, statins and 81 mg of aspirin  4. Diabetic education given  5. Discharge home today        was evaluated today and a DME order was entered for a glucometer and diabetic testing supplies because he requires this for treatment of diabetes mellitus. The patient has been sufficiently trained to use the device and he has skills to be able to use this device as intended. This patient is  requiring the use of insulin for diabetic control. The patient will require testing 4 times per day to achieve optimal diabetic control. Patient has Not applicable. The need for this equipment was discussed with the patient and he understands and is in agreement. discussed again with patient the importance of diet control, need to stay on insulin at this point, importance of starting ACE inhibitors statins and aspirin.   Discussed that he needs to see endocrinologist as outpatient to further streamline the treatment    Andrei Manning MD  7/26/2018  11:41 AM

## 2018-07-26 NOTE — PROGRESS NOTES
CLINICAL PHARMACY NOTE: MEDS TO [address] Select Patient?: No  Total # of Prescriptions Filled: 8   The following medications were delivered to the patient:  · Pen needles 32 clarisse  · NOVOLOG FLEXPEN 100U/ML  · ATORVASTATIN 20MG  · LISINOPRIL 2.5MG  · TRUE PLUS LANCETS  · ASPIRIN 81MG CHEW  · TRUE MATRIX GLUCOMETER  · BASAGLAR INSULIN PEN  Total # of Interventions Completed: 3  Time Spent (min): 24 HOURS    Additional Documentation:WE DELIVERED 8 PRESCRIPTIONS TO THE PATIENTS BED SIDE. WE STARTED WORKING ON HIS SCRIPTS ON 07/25/18 BUT WE HAD A HOLD UP WITH GETTING A PRIOR AUTHERIZATION ON BASAGLAR INSULIN ([employer] WAS FOR LEVEMIR) BUT INSURANCE WOULD NOT PAY FOR IT, ALSO INSURANCE REJECTED GLUCOMETER SO WE DID A VOUCHER FOR METER.

## 2018-07-27 LAB
ESTIMATED AVERAGE GLUCOSE: 200 MG/DL
HBA1C MFR BLD: 8.6 %

## 2018-08-29 ENCOUNTER — HOSPITAL ENCOUNTER (OUTPATIENT)
Age: 28
Discharge: HOME | End: 2018-08-29
Payer: COMMERCIAL

## 2018-08-29 LAB
ALBUMIN SERPL-MCNC: 4.4 G/DL (ref 3.5–5.2)
ALBUMIN/GLOBULIN RATIO: ABNORMAL (ref 1–2.5)
ALP BLD-CCNC: 95 U/L (ref 40–129)
ALT SERPL-CCNC: 28 U/L (ref 5–41)
ANION GAP SERPL CALCULATED.3IONS-SCNC: 11 MMOL/L (ref 9–17)
AST SERPL-CCNC: 24 U/L
BILIRUB SERPL-MCNC: 0.95 MG/DL (ref 0.3–1.2)
BUN BLDV-MCNC: 11 MG/DL (ref 6–20)
BUN/CREAT BLD: ABNORMAL (ref 9–20)
C-PEPTIDE: 0.8 NG/ML (ref 1.1–4.4)
CALCIUM IONIZED: 1.19 MMOL/L (ref 1.13–1.33)
CALCIUM SERPL-MCNC: 9.4 MG/DL (ref 8.6–10.4)
CHLORIDE BLD-SCNC: 104 MMOL/L (ref 98–107)
CHOLESTEROL/HDL RATIO: 4
CHOLESTEROL: 144 MG/DL
CO2: 27 MMOL/L (ref 20–31)
CREAT SERPL-MCNC: 0.85 MG/DL (ref 0.7–1.2)
CREATININE URINE: 379.9 MG/DL (ref 39–259)
GFR AFRICAN AMERICAN: >60 ML/MIN
GFR NON-AFRICAN AMERICAN: >60 ML/MIN
GFR SERPL CREATININE-BSD FRML MDRD: ABNORMAL ML/MIN/{1.73_M2}
GFR SERPL CREATININE-BSD FRML MDRD: ABNORMAL ML/MIN/{1.73_M2}
GLUCOSE BLD-MCNC: 144 MG/DL (ref 70–99)
HDLC SERPL-MCNC: 36 MG/DL
LDL CHOLESTEROL: 94 MG/DL (ref 0–130)
MICROALBUMIN/CREAT 24H UR: <12 MG/L
MICROALBUMIN/CREAT UR-RTO: ABNORMAL MCG/MG CREAT
POTASSIUM SERPL-SCNC: 4.2 MMOL/L (ref 3.7–5.3)
PTH INTACT: 30.03 PG/ML (ref 15–65)
SODIUM BLD-SCNC: 142 MMOL/L (ref 135–144)
TOTAL PROTEIN: 7 G/DL (ref 6.4–8.3)
TRIGL SERPL-MCNC: 69 MG/DL
VITAMIN D 25-HYDROXY: 24.3 NG/ML (ref 30–100)
VLDLC SERPL CALC-MCNC: ABNORMAL MG/DL (ref 1–30)

## 2018-08-29 PROCEDURE — 83970 ASSAY OF PARATHORMONE: CPT

## 2018-08-29 PROCEDURE — 82570 ASSAY OF URINE CREATININE: CPT

## 2018-08-29 PROCEDURE — 80053 COMPREHEN METABOLIC PANEL: CPT

## 2018-08-29 PROCEDURE — 84681 ASSAY OF C-PEPTIDE: CPT

## 2018-08-29 PROCEDURE — 36415 COLL VENOUS BLD VENIPUNCTURE: CPT

## 2018-08-29 PROCEDURE — 86341 ISLET CELL ANTIBODY: CPT

## 2018-08-29 PROCEDURE — 82043 UR ALBUMIN QUANTITATIVE: CPT

## 2018-08-29 PROCEDURE — 82330 ASSAY OF CALCIUM: CPT

## 2018-08-29 PROCEDURE — 82306 VITAMIN D 25 HYDROXY: CPT

## 2018-08-29 PROCEDURE — 80061 LIPID PANEL: CPT

## 2018-09-01 LAB — ISLET CELL ANTIBODY: NORMAL

## 2019-06-27 ENCOUNTER — APPOINTMENT (OUTPATIENT)
Dept: GENERAL RADIOLOGY | Age: 29
End: 2019-06-27
Payer: COMMERCIAL

## 2019-06-27 ENCOUNTER — HOSPITAL ENCOUNTER (EMERGENCY)
Age: 29
Discharge: HOME | End: 2019-06-27
Attending: EMERGENCY MEDICINE
Payer: COMMERCIAL

## 2019-06-27 VITALS
HEART RATE: 70 BPM | SYSTOLIC BLOOD PRESSURE: 121 MMHG | HEIGHT: 69 IN | BODY MASS INDEX: 28.14 KG/M2 | TEMPERATURE: 97.8 F | WEIGHT: 190 LBS | DIASTOLIC BLOOD PRESSURE: 74 MMHG | RESPIRATION RATE: 16 BRPM

## 2019-06-27 DIAGNOSIS — M25.562 ACUTE PAIN OF LEFT KNEE: Primary | ICD-10-CM

## 2019-06-27 PROCEDURE — 6370000000 HC RX 637 (ALT 250 FOR IP): Performed by: NURSE PRACTITIONER

## 2019-06-27 PROCEDURE — 73562 X-RAY EXAM OF KNEE 3: CPT

## 2019-06-27 PROCEDURE — 99283 EMERGENCY DEPT VISIT LOW MDM: CPT

## 2019-06-27 RX ORDER — IBUPROFEN 800 MG/1
800 TABLET ORAL ONCE
Status: DISCONTINUED | OUTPATIENT
Start: 2019-06-27 | End: 2019-06-27 | Stop reason: HOSPADM

## 2019-06-27 RX ORDER — IBUPROFEN 800 MG/1
800 TABLET ORAL EVERY 8 HOURS PRN
Qty: 20 TABLET | Refills: 0 | Status: SHIPPED | OUTPATIENT
Start: 2019-06-27

## 2019-06-27 RX ORDER — IBUPROFEN 800 MG/1
800 TABLET ORAL ONCE
Status: COMPLETED | OUTPATIENT
Start: 2019-06-27 | End: 2019-06-27

## 2019-06-27 RX ADMIN — IBUPROFEN 800 MG: 800 TABLET ORAL at 16:01

## 2019-06-27 ASSESSMENT — ENCOUNTER SYMPTOMS
SHORTNESS OF BREATH: 0
TROUBLE SWALLOWING: 0
NAUSEA: 0
COUGH: 0
VOMITING: 0

## 2019-06-27 ASSESSMENT — PAIN DESCRIPTION - PAIN TYPE: TYPE: ACUTE PAIN

## 2019-06-27 ASSESSMENT — PAIN DESCRIPTION - ORIENTATION: ORIENTATION: LEFT

## 2019-06-27 ASSESSMENT — PAIN SCALES - GENERAL
PAINLEVEL_OUTOF10: 8
PAINLEVEL_OUTOF10: 8

## 2019-06-27 ASSESSMENT — PAIN DESCRIPTION - LOCATION: LOCATION: KNEE

## 2019-06-27 ASSESSMENT — PAIN DESCRIPTION - FREQUENCY: FREQUENCY: CONTINUOUS

## 2019-06-27 NOTE — LETTER
[facility] ED  [address] [id number]  Phone: [phone]               June 27, 2019    Patient: Shannon Damico   YOB: 1990   Date of Visit: 6/27/2019       To Whom It May Concern: Shannon Damico was seen and treated in our emergency department on 6/27/2019. He may return to work once cleared by orthopedic.       Sincerely,       judah johnson RN         Signature:__________________________________

## 2019-06-27 NOTE — ED NOTES
Pt states he jumped off porch yesterday- pain in left knee. PMS intact distal to pain.  Pt has been ambulating with cane since injury     Jas Montejo RN  06/27/19 7233

## 2019-06-27 NOTE — ED PROVIDER NOTES
[facility] ED  eMERGENCYdEPARTMENT eNCOUnter      Pt Name: Anibal Alegria  MRN: [id number]  [place] 1990  Date of evaluation: 6/27/2019  Provider:SID GARCIA CNP    CHIEF COMPLAINT       Chief Complaint   Patient presents with    Leg Injury         HISTORY OF PRESENT ILLNESS  (Location/Symptom, Timing/Onset, Context/Setting, Quality, Duration, Modifying Factors, Severity.)   Anibal Alegria is a 26 y.o. male who presents to the emergency department complaint of left knee injury. Patient states that he jumped off his front porch yesterday. Patient fell and states that he felt sudden sharp pain in his left knee. States that pain is worsened by flexion and extension of the knee. States that it is very painful to ambulate. No other injuries. Has been ambulating with a cane for support. Nursing Notes were reviewed and I agree. REVIEW OF SYSTEMS    (2-9 systems for level 4,10 or more for level 5)      Review of Systems   Constitutional: Negative for chills and fever. HENT: Negative for trouble swallowing. Respiratory: Negative for cough and shortness of breath. Cardiovascular: Negative for chest pain and palpitations. Gastrointestinal: Negative for nausea and vomiting. Musculoskeletal:        Left knee injury     Except as noted above the remainder of the review of systems was reviewed andnegative. PAST MEDICAL HISTORY         Diagnosis Date    Asthma     Carpal tunnel syndrome, bilateral 3/21/2013    Chronic back pain 3/21/2013     Reviewed. SURGICAL HISTORY           Procedure Laterality Date    NECK SURGERY N/A year of 2010     Reviewed. CURRENT MEDICATIONS       Previous Medications    ALBUTEROL (PROVENTIL HFA) 108 (90 BASE) MCG/ACT INHALER    Inhale 2 puffs into the lungs every 4 hours as needed for Wheezing.     ASPIRIN 81 MG CHEWABLE TABLET    Take 1 tablet by mouth daily    ATORVASTATIN (LIPITOR) 20 MG TABLET    Take 1 tablet by mouth nightly FLUTICASONE-SALMETEROL (ADVAIR DISKUS) 250-50 MCG/DOSE AEPB    Inhale 1 puff into the lungs every 12 hours. INSULIN ASPART (NOVOLOG FLEXPEN) 100 UNIT/ML INJECTION PEN    Inject 3 Units into the skin 3 times daily (before meals) Use as per the scale provided    INSULIN DETEMIR (LEVEMIR FLEXTOUCH) 100 UNIT/ML INJECTION PEN    Inject 22 Units into the skin daily (with breakfast)    LISINOPRIL (PRINIVIL;ZESTRIL) 2.5 MG TABLET    Take 1 tablet by mouth daily       ALLERGIES     Bee venom and Penicillins    FAMILY HISTORY           Problem Relation Age of Onset    Schizophrenia Mother     Glaucoma Mother     Asthma Mother      Family Status   Relation Name Status    Mother  Alive    Father  Alive      Reviewed and not relevant. SOCIAL HISTORY      reports that he has been smoking cigarettes. He has a 4.00 pack-year smoking history. He has never used smokeless tobacco. He reports that he drinks alcohol. Reviewed. PHYSICAL EXAM    (up to 7 for level 4, 8 or more for level 5)     ED Triage Vitals [06/27/19 1547]   BP Temp Temp Source Pulse Resp SpO2 Height Weight   121/74 97.8 °F (36.6 °C) Oral 70 16 -- 5' 9\" (1.753 m) 190 lb (86.2 kg)       Physical Exam   Constitutional: He is oriented to person, place, and time. He appears well-developed and well-nourished. No distress. HENT:   Head: Normocephalic and atraumatic. Right Ear: External ear normal.   Left Ear: External ear normal.   Nose: Nose normal.   Eyes: Right eye exhibits no discharge. Left eye exhibits no discharge. No scleral icterus. Neck: Normal range of motion. No tracheal deviation present. Pulmonary/Chest: Effort normal. No stridor. No respiratory distress. Musculoskeletal: Normal range of motion. He exhibits no edema. Left knee: He exhibits normal range of motion, no swelling, no ecchymosis, no erythema and no bony tenderness. Tenderness found. Lateral joint line tenderness noted. No medial joint line tenderness noted.    + tenderness over anterior and lateral knee. No swelling, erythema or ecchymosis. Full range of motion but causes discomfort. No joint laxity. Distal sensation pulses intact. No tenderness in left ankle or hip. Neurological: He is alert and oriented to person, place, and time. Coordination normal.   Skin: Skin is warm and dry. He is not diaphoretic. Psychiatric: He has a normal mood and affect. His behavior is normal.       DIAGNOSTIC RESULTS     RADIOLOGY:   [] Visualized by me     Xr Knee Left (3 Views)    Result Date: 6/27/2019  EXAMINATION: 3 XRAY VIEWS OF THE LEFT KNEE 6/27/2019 3:57 pm COMPARISON: 05/18/2018 HISTORY: ORDERING SYSTEM PROVIDED HISTORY: injury Ordering Physician Provided Reason for Exam: Pain Acuity: Acute Type of Exam: Initial Mechanism of Injury: Jumped off front porch FINDINGS: No evidence of acute fracture or dislocation. No focal osseous lesion. No evidence of joint effusion. No focal soft tissue abnormality. Negative left knee. LABS:  Labs Reviewed - No data to display    All other labs were within normal range or not returned asof this dictation. EMERGENCYDEPARTMENT COURSE and DIFFERENTIAL DIAGNOSIS/MDM:   Patient presents to ED with complaints of left knee injury after jumping off the porch yesterday. X-ray negative for acute fracture dislocation, most likely ligamentous injury. Will provide knee immobilizer, crutches, recommend ice pack, elevation and anti-inflammatories. Follow-up with orthopedic surgeon. Okay to discharge home. Follow-upwith family doctor or clinic of choice in 1 or 2 days for a recheck. Return to ED if any worsening or new symptoms. Vitals:    Vitals:    06/27/19 1547   BP: 121/74   Pulse: 70   Resp: 16   Temp: 97.8 °F (36.6 °C)   TempSrc: Oral   Weight: 190 lb (86.2 kg)   Height: 5' 9\" (1.753 m)       Vitals reviewed. PROCEDURES:  None    FINAL IMPRESSION      1.  Acute pain of left knee          DISPOSITION/PLAN   DISPOSITION Decision To Discharge 06/27/2019 04:26:27 PM      PATIENT REFERRED TO:  Cely Crowder MD  [address] [place] [address] [address] [phone]    Schedule an appointment as soon as possible for a visit in 1 day  Follow up visit    [facility] ED  [facility] [address]  [phone]    If symptoms worsen      DISCHARGE MEDICATIONS:  New Prescriptions    IBUPROFEN (ADVIL;MOTRIN) 800 MG TABLET    Take 1 tablet by mouth every 8 hours as needed for Pain       (Please note thatportions of this note were completed with a voice recognition program.  Efforts were made to edit the dictations but occasionally words are mis-transcribed.)    Tai Maxwell, SID - CNP  06/27/19 0489

## 2020-09-07 ENCOUNTER — HOSPITAL ENCOUNTER (INPATIENT)
Age: 30
LOS: 1 days | Discharge: HOME | DRG: 639 | End: 2020-09-08
Attending: EMERGENCY MEDICINE | Admitting: INTERNAL MEDICINE
Payer: COMMERCIAL

## 2020-09-07 ENCOUNTER — APPOINTMENT (OUTPATIENT)
Dept: GENERAL RADIOLOGY | Age: 30
DRG: 639 | End: 2020-09-07
Payer: COMMERCIAL

## 2020-09-07 PROBLEM — E10.10 TYPE 1 DIABETES MELLITUS WITH KETOACIDOSIS, UNCONTROLLED (HCC): Status: ACTIVE | Noted: 2020-09-07

## 2020-09-07 LAB
-: NORMAL
ABSOLUTE EOS #: 0.06 K/UL (ref 0–0.44)
ABSOLUTE IMMATURE GRANULOCYTE: 0.13 K/UL (ref 0–0.3)
ABSOLUTE LYMPH #: 2.57 K/UL (ref 1.1–3.7)
ABSOLUTE MONO #: 1.19 K/UL (ref 0.1–1.2)
ALBUMIN SERPL-MCNC: 4.1 G/DL (ref 3.5–5.2)
ALBUMIN/GLOBULIN RATIO: 1.6 (ref 1–2.5)
ALLEN TEST: ABNORMAL
ALP BLD-CCNC: 141 U/L (ref 40–129)
ALT SERPL-CCNC: 130 U/L (ref 5–41)
AMORPHOUS: NORMAL
ANION GAP SERPL CALCULATED.3IONS-SCNC: 26 MMOL/L (ref 9–17)
ANION GAP SERPL CALCULATED.3IONS-SCNC: 9 MMOL/L (ref 9–17)
ANION GAP: 12 MMOL/L (ref 7–16)
AST SERPL-CCNC: 92 U/L
BACTERIA: NORMAL
BASOPHILS # BLD: 0 % (ref 0–2)
BASOPHILS ABSOLUTE: 0.09 K/UL (ref 0–0.2)
BETA-HYDROXYBUTYRATE: 7.19 MMOL/L (ref 0.02–0.27)
BILIRUB SERPL-MCNC: 1.37 MG/DL (ref 0.3–1.2)
BILIRUBIN URINE: NEGATIVE
BUN BLDV-MCNC: 18 MG/DL (ref 6–20)
BUN BLDV-MCNC: 19 MG/DL (ref 6–20)
BUN/CREAT BLD: ABNORMAL (ref 9–20)
BUN/CREAT BLD: ABNORMAL (ref 9–20)
CALCIUM SERPL-MCNC: 7.8 MG/DL (ref 8.6–10.4)
CALCIUM SERPL-MCNC: 8.8 MG/DL (ref 8.6–10.4)
CASTS UA: NORMAL /LPF (ref 0–8)
CHLORIDE BLD-SCNC: 107 MMOL/L (ref 98–107)
CHLORIDE BLD-SCNC: 96 MMOL/L (ref 98–107)
CO2: 10 MMOL/L (ref 20–31)
CO2: 19 MMOL/L (ref 20–31)
COLOR: YELLOW
COMMENT UA: ABNORMAL
CREAT SERPL-MCNC: 0.95 MG/DL (ref 0.7–1.2)
CREAT SERPL-MCNC: 0.98 MG/DL (ref 0.7–1.2)
CRYSTALS, UA: NORMAL /HPF
DIFFERENTIAL TYPE: ABNORMAL
EOSINOPHILS RELATIVE PERCENT: 0 % (ref 1–4)
EPITHELIAL CELLS UA: NORMAL /HPF (ref 0–5)
FIO2: ABNORMAL
GFR AFRICAN AMERICAN: >60 ML/MIN
GFR AFRICAN AMERICAN: >60 ML/MIN
GFR NON-AFRICAN AMERICAN: >60 ML/MIN
GFR SERPL CREATININE-BSD FRML MDRD: >60 ML/MIN
GFR SERPL CREATININE-BSD FRML MDRD: ABNORMAL ML/MIN/{1.73_M2}
GFR SERPL CREATININE-BSD FRML MDRD: NORMAL ML/MIN/{1.73_M2}
GLUCOSE BLD-MCNC: 119 MG/DL (ref 75–110)
GLUCOSE BLD-MCNC: 126 MG/DL (ref 75–110)
GLUCOSE BLD-MCNC: 173 MG/DL (ref 75–110)
GLUCOSE BLD-MCNC: 206 MG/DL (ref 75–110)
GLUCOSE BLD-MCNC: 207 MG/DL (ref 75–110)
GLUCOSE BLD-MCNC: 207 MG/DL (ref 75–110)
GLUCOSE BLD-MCNC: 210 MG/DL (ref 75–110)
GLUCOSE BLD-MCNC: 218 MG/DL (ref 70–99)
GLUCOSE BLD-MCNC: 243 MG/DL
GLUCOSE BLD-MCNC: 243 MG/DL (ref 75–110)
GLUCOSE BLD-MCNC: 274 MG/DL
GLUCOSE BLD-MCNC: 274 MG/DL (ref 75–110)
GLUCOSE BLD-MCNC: 349 MG/DL (ref 70–99)
GLUCOSE BLD-MCNC: 360 MG/DL (ref 74–100)
GLUCOSE BLD-MCNC: 370 MG/DL
GLUCOSE BLD-MCNC: 370 MG/DL (ref 75–110)
GLUCOSE BLD-MCNC: 386 MG/DL
GLUCOSE BLD-MCNC: 386 MG/DL (ref 75–110)
GLUCOSE BLD-MCNC: 389 MG/DL
GLUCOSE BLD-MCNC: 389 MG/DL (ref 75–110)
GLUCOSE URINE: ABNORMAL
HAV IGM SER IA-ACNC: NONREACTIVE
HCO3 VENOUS: 12.4 MMOL/L (ref 22–29)
HCT VFR BLD CALC: 47.3 % (ref 40.7–50.3)
HEMOGLOBIN: 15.9 G/DL (ref 13–17)
HEPATITIS B CORE IGM ANTIBODY: NONREACTIVE
HEPATITIS B SURFACE ANTIGEN: NONREACTIVE
HEPATITIS C ANTIBODY: NONREACTIVE
IMMATURE GRANULOCYTES: 1 %
KETONES, URINE: ABNORMAL
LACTIC ACID, WHOLE BLOOD: 2.2 MMOL/L (ref 0.7–2.1)
LACTIC ACID: ABNORMAL MMOL/L
LEUKOCYTE ESTERASE, URINE: NEGATIVE
LIPASE: 9 U/L (ref 13–60)
LYMPHOCYTES # BLD: 13 % (ref 24–43)
MAGNESIUM: 1.8 MG/DL (ref 1.6–2.6)
MCH RBC QN AUTO: 30.6 PG (ref 25.2–33.5)
MCHC RBC AUTO-ENTMCNC: 33.6 G/DL (ref 28.4–34.8)
MCV RBC AUTO: 91.1 FL (ref 82.6–102.9)
MODE: ABNORMAL
MONOCYTES # BLD: 6 % (ref 3–12)
MUCUS: NORMAL
NEGATIVE BASE EXCESS, VEN: 11 (ref 0–2)
NITRITE, URINE: NEGATIVE
NRBC AUTOMATED: 0 PER 100 WBC
O2 DEVICE/FLOW/%: ABNORMAL
O2 SAT, VEN: 89 % (ref 60–85)
OTHER OBSERVATIONS UA: NORMAL
PATIENT TEMP: ABNORMAL
PCO2, VEN: 22.6 MM HG (ref 41–51)
PDW BLD-RTO: 13.1 % (ref 11.8–14.4)
PH UA: 5 (ref 5–8)
PH VENOUS: 7.35 (ref 7.32–7.43)
PHOSPHORUS: 1.9 MG/DL (ref 2.5–4.5)
PLATELET # BLD: 261 K/UL (ref 138–453)
PLATELET ESTIMATE: ABNORMAL
PMV BLD AUTO: 11.3 FL (ref 8.1–13.5)
PO2, VEN: 58.3 MM HG (ref 30–50)
POC CHLORIDE: 104 MMOL/L (ref 98–107)
POC CREATININE: 0.94 MG/DL (ref 0.51–1.19)
POC HEMATOCRIT: 48 % (ref 41–53)
POC HEMOGLOBIN: 16.4 G/DL (ref 13.5–17.5)
POC IONIZED CALCIUM: 1.02 MMOL/L (ref 1.15–1.33)
POC LACTIC ACID: 1.33 MMOL/L (ref 0.56–1.39)
POC PCO2 TEMP: ABNORMAL MM HG
POC PH TEMP: ABNORMAL
POC PO2 TEMP: ABNORMAL MM HG
POC POTASSIUM: 4.4 MMOL/L (ref 3.5–4.5)
POC SODIUM: 128 MMOL/L (ref 138–146)
POSITIVE BASE EXCESS, VEN: ABNORMAL (ref 0–3)
POTASSIUM SERPL-SCNC: 3.5 MMOL/L (ref 3.7–5.3)
POTASSIUM SERPL-SCNC: 4.6 MMOL/L (ref 3.7–5.3)
PROTEIN UA: ABNORMAL
RBC # BLD: 5.19 M/UL (ref 4.21–5.77)
RBC # BLD: ABNORMAL 10*6/UL
RBC UA: NORMAL /HPF (ref 0–4)
RENAL EPITHELIAL, UA: NORMAL /HPF
SAMPLE SITE: ABNORMAL
SEG NEUTROPHILS: 80 % (ref 36–65)
SEGMENTED NEUTROPHILS ABSOLUTE COUNT: 16.16 K/UL (ref 1.5–8.1)
SODIUM BLD-SCNC: 132 MMOL/L (ref 135–144)
SODIUM BLD-SCNC: 135 MMOL/L (ref 135–144)
SPECIFIC GRAVITY UA: 1.03 (ref 1–1.03)
TOTAL CK: 121 U/L (ref 39–308)
TOTAL CO2, VENOUS: 13 MMOL/L (ref 23–30)
TOTAL PROTEIN: 6.6 G/DL (ref 6.4–8.3)
TRICHOMONAS: NORMAL
TROPONIN INTERP: NORMAL
TROPONIN T: NORMAL NG/ML
TROPONIN, HIGH SENSITIVITY: 6 NG/L (ref 0–22)
TURBIDITY: CLEAR
URINE HGB: NEGATIVE
UROBILINOGEN, URINE: NORMAL
WBC # BLD: 20.2 K/UL (ref 3.5–11.3)
WBC # BLD: ABNORMAL 10*3/UL
WBC UA: NORMAL /HPF (ref 0–5)
YEAST: NORMAL

## 2020-09-07 PROCEDURE — 6360000002 HC RX W HCPCS: Performed by: STUDENT IN AN ORGANIZED HEALTH CARE EDUCATION/TRAINING PROGRAM

## 2020-09-07 PROCEDURE — 84484 ASSAY OF TROPONIN QUANT: CPT

## 2020-09-07 PROCEDURE — 87040 BLOOD CULTURE FOR BACTERIA: CPT

## 2020-09-07 PROCEDURE — 96375 TX/PRO/DX INJ NEW DRUG ADDON: CPT

## 2020-09-07 PROCEDURE — 96365 THER/PROPH/DIAG IV INF INIT: CPT

## 2020-09-07 PROCEDURE — 6360000002 HC RX W HCPCS: Performed by: EMERGENCY MEDICINE

## 2020-09-07 PROCEDURE — 6370000000 HC RX 637 (ALT 250 FOR IP): Performed by: STUDENT IN AN ORGANIZED HEALTH CARE EDUCATION/TRAINING PROGRAM

## 2020-09-07 PROCEDURE — 80048 BASIC METABOLIC PNL TOTAL CA: CPT

## 2020-09-07 PROCEDURE — 85025 COMPLETE CBC W/AUTO DIFF WBC: CPT

## 2020-09-07 PROCEDURE — 85014 HEMATOCRIT: CPT

## 2020-09-07 PROCEDURE — 2580000003 HC RX 258: Performed by: STUDENT IN AN ORGANIZED HEALTH CARE EDUCATION/TRAINING PROGRAM

## 2020-09-07 PROCEDURE — 83605 ASSAY OF LACTIC ACID: CPT

## 2020-09-07 PROCEDURE — 82947 ASSAY GLUCOSE BLOOD QUANT: CPT

## 2020-09-07 PROCEDURE — 84295 ASSAY OF SERUM SODIUM: CPT

## 2020-09-07 PROCEDURE — 84132 ASSAY OF SERUM POTASSIUM: CPT

## 2020-09-07 PROCEDURE — 99285 EMERGENCY DEPT VISIT HI MDM: CPT

## 2020-09-07 PROCEDURE — 2060000000 HC ICU INTERMEDIATE R&B

## 2020-09-07 PROCEDURE — 80074 ACUTE HEPATITIS PANEL: CPT

## 2020-09-07 PROCEDURE — 80053 COMPREHEN METABOLIC PANEL: CPT

## 2020-09-07 PROCEDURE — 82435 ASSAY OF BLOOD CHLORIDE: CPT

## 2020-09-07 PROCEDURE — 94761 N-INVAS EAR/PLS OXIMETRY MLT: CPT

## 2020-09-07 PROCEDURE — 82803 BLOOD GASES ANY COMBINATION: CPT

## 2020-09-07 PROCEDURE — 82010 KETONE BODYS QUAN: CPT

## 2020-09-07 PROCEDURE — 2500000003 HC RX 250 WO HCPCS: Performed by: STUDENT IN AN ORGANIZED HEALTH CARE EDUCATION/TRAINING PROGRAM

## 2020-09-07 PROCEDURE — 93005 ELECTROCARDIOGRAM TRACING: CPT | Performed by: EMERGENCY MEDICINE

## 2020-09-07 PROCEDURE — 36415 COLL VENOUS BLD VENIPUNCTURE: CPT

## 2020-09-07 PROCEDURE — 82550 ASSAY OF CK (CPK): CPT

## 2020-09-07 PROCEDURE — 82330 ASSAY OF CALCIUM: CPT

## 2020-09-07 PROCEDURE — 83735 ASSAY OF MAGNESIUM: CPT

## 2020-09-07 PROCEDURE — 2580000003 HC RX 258: Performed by: EMERGENCY MEDICINE

## 2020-09-07 PROCEDURE — 99223 1ST HOSP IP/OBS HIGH 75: CPT | Performed by: INTERNAL MEDICINE

## 2020-09-07 PROCEDURE — 82565 ASSAY OF CREATININE: CPT

## 2020-09-07 PROCEDURE — 6370000000 HC RX 637 (ALT 250 FOR IP): Performed by: EMERGENCY MEDICINE

## 2020-09-07 PROCEDURE — 83690 ASSAY OF LIPASE: CPT

## 2020-09-07 PROCEDURE — 81001 URINALYSIS AUTO W/SCOPE: CPT

## 2020-09-07 PROCEDURE — 71045 X-RAY EXAM CHEST 1 VIEW: CPT

## 2020-09-07 PROCEDURE — 84100 ASSAY OF PHOSPHORUS: CPT

## 2020-09-07 RX ORDER — 0.9 % SODIUM CHLORIDE 0.9 %
1000 INTRAVENOUS SOLUTION INTRAVENOUS ONCE
Status: COMPLETED | OUTPATIENT
Start: 2020-09-07 | End: 2020-09-07

## 2020-09-07 RX ORDER — BUDESONIDE AND FORMOTEROL FUMARATE DIHYDRATE 160; 4.5 UG/1; UG/1
2 AEROSOL RESPIRATORY (INHALATION) 2 TIMES DAILY
Status: DISCONTINUED | OUTPATIENT
Start: 2020-09-07 | End: 2020-09-07

## 2020-09-07 RX ORDER — POTASSIUM CHLORIDE 7.45 MG/ML
10 INJECTION INTRAVENOUS PRN
Status: DISCONTINUED | OUTPATIENT
Start: 2020-09-07 | End: 2020-09-08 | Stop reason: HOSPADM

## 2020-09-07 RX ORDER — DEXTROSE MONOHYDRATE 50 MG/ML
100 INJECTION, SOLUTION INTRAVENOUS PRN
Status: DISCONTINUED | OUTPATIENT
Start: 2020-09-07 | End: 2020-09-07

## 2020-09-07 RX ORDER — DEXTROSE MONOHYDRATE 25 G/50ML
12.5 INJECTION, SOLUTION INTRAVENOUS PRN
Status: DISCONTINUED | OUTPATIENT
Start: 2020-09-07 | End: 2020-09-08 | Stop reason: HOSPADM

## 2020-09-07 RX ORDER — NICOTINE POLACRILEX 4 MG
15 LOZENGE BUCCAL PRN
Status: DISCONTINUED | OUTPATIENT
Start: 2020-09-07 | End: 2020-09-07

## 2020-09-07 RX ORDER — KETOROLAC TROMETHAMINE 30 MG/ML
30 INJECTION, SOLUTION INTRAMUSCULAR; INTRAVENOUS ONCE
Status: COMPLETED | OUTPATIENT
Start: 2020-09-07 | End: 2020-09-07

## 2020-09-07 RX ORDER — 0.9 % SODIUM CHLORIDE 0.9 %
15 INTRAVENOUS SOLUTION INTRAVENOUS ONCE
Status: DISCONTINUED | OUTPATIENT
Start: 2020-09-07 | End: 2020-09-07

## 2020-09-07 RX ORDER — SODIUM CHLORIDE 0.9 % (FLUSH) 0.9 %
10 SYRINGE (ML) INJECTION EVERY 12 HOURS SCHEDULED
Status: CANCELLED | OUTPATIENT
Start: 2020-09-07

## 2020-09-07 RX ORDER — SODIUM CHLORIDE AND POTASSIUM CHLORIDE .9; .15 G/100ML; G/100ML
SOLUTION INTRAVENOUS CONTINUOUS
Status: DISCONTINUED | OUTPATIENT
Start: 2020-09-07 | End: 2020-09-08

## 2020-09-07 RX ORDER — DEXTROSE MONOHYDRATE 25 G/50ML
12.5 INJECTION, SOLUTION INTRAVENOUS PRN
Status: DISCONTINUED | OUTPATIENT
Start: 2020-09-07 | End: 2020-09-07

## 2020-09-07 RX ORDER — SODIUM CHLORIDE 0.9 % (FLUSH) 0.9 %
10 SYRINGE (ML) INJECTION PRN
Status: CANCELLED | OUTPATIENT
Start: 2020-09-07

## 2020-09-07 RX ORDER — DEXTROSE AND SODIUM CHLORIDE 5; .45 G/100ML; G/100ML
INJECTION, SOLUTION INTRAVENOUS CONTINUOUS PRN
Status: DISCONTINUED | OUTPATIENT
Start: 2020-09-07 | End: 2020-09-08 | Stop reason: HOSPADM

## 2020-09-07 RX ORDER — MORPHINE SULFATE 4 MG/ML
4 INJECTION, SOLUTION INTRAMUSCULAR; INTRAVENOUS ONCE
Status: COMPLETED | OUTPATIENT
Start: 2020-09-07 | End: 2020-09-07

## 2020-09-07 RX ORDER — SODIUM CHLORIDE, SODIUM LACTATE, POTASSIUM CHLORIDE, CALCIUM CHLORIDE 600; 310; 30; 20 MG/100ML; MG/100ML; MG/100ML; MG/100ML
1000 INJECTION, SOLUTION INTRAVENOUS ONCE
Status: DISCONTINUED | OUTPATIENT
Start: 2020-09-07 | End: 2020-09-07

## 2020-09-07 RX ORDER — PROMETHAZINE HYDROCHLORIDE 25 MG/ML
25 INJECTION, SOLUTION INTRAMUSCULAR; INTRAVENOUS ONCE
Status: COMPLETED | OUTPATIENT
Start: 2020-09-07 | End: 2020-09-07

## 2020-09-07 RX ORDER — SODIUM CHLORIDE, SODIUM LACTATE, POTASSIUM CHLORIDE, CALCIUM CHLORIDE 600; 310; 30; 20 MG/100ML; MG/100ML; MG/100ML; MG/100ML
1000 INJECTION, SOLUTION INTRAVENOUS ONCE
Status: COMPLETED | OUTPATIENT
Start: 2020-09-07 | End: 2020-09-07

## 2020-09-07 RX ORDER — ONDANSETRON 2 MG/ML
4 INJECTION INTRAMUSCULAR; INTRAVENOUS ONCE
Status: COMPLETED | OUTPATIENT
Start: 2020-09-07 | End: 2020-09-07

## 2020-09-07 RX ORDER — DEXTROSE, SODIUM CHLORIDE, AND POTASSIUM CHLORIDE 5; .9; .15 G/100ML; G/100ML; G/100ML
INJECTION INTRAVENOUS CONTINUOUS
Status: DISCONTINUED | OUTPATIENT
Start: 2020-09-07 | End: 2020-09-07

## 2020-09-07 RX ORDER — DEXTROSE AND SODIUM CHLORIDE 5; .45 G/100ML; G/100ML
INJECTION, SOLUTION INTRAVENOUS
Status: DISCONTINUED
Start: 2020-09-07 | End: 2020-09-08

## 2020-09-07 RX ORDER — ACETAMINOPHEN 325 MG/1
650 TABLET ORAL EVERY 4 HOURS PRN
Status: CANCELLED | OUTPATIENT
Start: 2020-09-07

## 2020-09-07 RX ORDER — ONDANSETRON 2 MG/ML
4 INJECTION INTRAMUSCULAR; INTRAVENOUS EVERY 8 HOURS PRN
Status: CANCELLED | OUTPATIENT
Start: 2020-09-07

## 2020-09-07 RX ORDER — SODIUM CHLORIDE 9 MG/ML
INJECTION, SOLUTION INTRAVENOUS CONTINUOUS
Status: DISCONTINUED | OUTPATIENT
Start: 2020-09-07 | End: 2020-09-07

## 2020-09-07 RX ORDER — MAGNESIUM SULFATE 1 G/100ML
1 INJECTION INTRAVENOUS PRN
Status: DISCONTINUED | OUTPATIENT
Start: 2020-09-07 | End: 2020-09-08 | Stop reason: HOSPADM

## 2020-09-07 RX ORDER — IPRATROPIUM BROMIDE AND ALBUTEROL SULFATE 2.5; .5 MG/3ML; MG/3ML
1 SOLUTION RESPIRATORY (INHALATION) EVERY 4 HOURS PRN
Status: DISCONTINUED | OUTPATIENT
Start: 2020-09-07 | End: 2020-09-08 | Stop reason: HOSPADM

## 2020-09-07 RX ADMIN — POTASSIUM CHLORIDE 10 MEQ: 7.46 INJECTION, SOLUTION INTRAVENOUS at 23:46

## 2020-09-07 RX ADMIN — ONDANSETRON 4 MG: 2 INJECTION INTRAMUSCULAR; INTRAVENOUS at 10:16

## 2020-09-07 RX ADMIN — SODIUM CHLORIDE, POTASSIUM CHLORIDE, SODIUM LACTATE AND CALCIUM CHLORIDE 1000 ML: 600; 310; 30; 20 INJECTION, SOLUTION INTRAVENOUS at 10:15

## 2020-09-07 RX ADMIN — ENOXAPARIN SODIUM 40 MG: 40 INJECTION SUBCUTANEOUS at 14:30

## 2020-09-07 RX ADMIN — DEXTROSE AND SODIUM CHLORIDE: 5; 450 INJECTION, SOLUTION INTRAVENOUS at 15:37

## 2020-09-07 RX ADMIN — SODIUM CHLORIDE 0.2 UNITS/KG/HR: 9 INJECTION, SOLUTION INTRAVENOUS at 13:13

## 2020-09-07 RX ADMIN — POTASSIUM CHLORIDE 10 MEQ: 7.46 INJECTION, SOLUTION INTRAVENOUS at 21:39

## 2020-09-07 RX ADMIN — SODIUM CHLORIDE 0.1 UNITS/KG/HR: 9 INJECTION, SOLUTION INTRAVENOUS at 12:04

## 2020-09-07 RX ADMIN — SODIUM PHOSPHATE, MONOBASIC, MONOHYDRATE 15 MMOL: 276; 142 INJECTION, SOLUTION INTRAVENOUS at 21:56

## 2020-09-07 RX ADMIN — KETOROLAC TROMETHAMINE 30 MG: 30 INJECTION, SOLUTION INTRAMUSCULAR at 11:24

## 2020-09-07 RX ADMIN — PROMETHAZINE HYDROCHLORIDE 25 MG: 25 INJECTION INTRAMUSCULAR; INTRAVENOUS at 11:24

## 2020-09-07 RX ADMIN — SODIUM CHLORIDE 1000 ML: 9 INJECTION, SOLUTION INTRAVENOUS at 11:33

## 2020-09-07 RX ADMIN — THIAMINE HYDROCHLORIDE: 100 INJECTION, SOLUTION INTRAMUSCULAR; INTRAVENOUS at 18:18

## 2020-09-07 RX ADMIN — SODIUM CHLORIDE 6.78 UNITS/HR: 9 INJECTION, SOLUTION INTRAVENOUS at 21:56

## 2020-09-07 RX ADMIN — POTASSIUM CHLORIDE AND SODIUM CHLORIDE: 900; 150 INJECTION, SOLUTION INTRAVENOUS at 14:28

## 2020-09-07 RX ADMIN — MORPHINE SULFATE 4 MG: 4 INJECTION INTRAVENOUS at 11:24

## 2020-09-07 ASSESSMENT — PAIN SCALES - GENERAL
PAINLEVEL_OUTOF10: 0
PAINLEVEL_OUTOF10: 10
PAINLEVEL_OUTOF10: 0
PAINLEVEL_OUTOF10: 8

## 2020-09-07 ASSESSMENT — ENCOUNTER SYMPTOMS
BLOOD IN STOOL: 0
NAUSEA: 0
ABDOMINAL PAIN: 0
COUGH: 0
CONSTIPATION: 1
NAUSEA: 1
SORE THROAT: 0
VOMITING: 1
DIARRHEA: 0
VOMITING: 0
ABDOMINAL PAIN: 1
SHORTNESS OF BREATH: 0

## 2020-09-07 NOTE — ED NOTES
Pt placed on cardiac monitor, BP cuff, and pulse ox. Alarms set.       Edson Meigs, RN  09/07/20 5821

## 2020-09-07 NOTE — ED NOTES
Pt resting comfortably on cot, girlfriend at bedside. Pt states the nausea is better controlled, he still has some aching in his arms and legs but nothing like it was before. Pt updated on plan for admission, awaiting bed placement. Will continue to monitor.              Duncan Burton RN  09/07/20 0477

## 2020-09-07 NOTE — H&P
[address] [facility]     Department of Internal Medicine - Staff Internal Medicine Teaching Service          ADMISSION NOTE/HISTORY AND PHYSICAL EXAMINATION   Date: 9/7/2020  Patient Name: Kb Ram  Date of admission: 9/7/2020  9:48 AM  YOB: 1990  PCP: Mariana Loaiza MD  History Obtained From:  patient    CHIEF COMPLAINT     Chief complaint: Vomiting and generalized body aches    HISTORY OF PRESENTING ILLNESS     The patient is a pleasant 31 y.o. male presents with past history of diabetes mellitus presented with a chief complaint of nausea with multiple episodes of vomiting after heavy drinking and missing his insulin doses. In the ED, he was afebrile and hemodynamically stable with blood pressure of 115/111, heart rate 101, RR 18/min. His lab values showed bicarb 10, beta hydroxybutyrate 7.18, potassium 4.6, and peak glucose of 389, WBC 20, urinalysis unremarkable for UTI, VBG's 7.34/22/12 and chest x-ray was unremarkable. He received 2 L of IV fluids in the ED. He was diagnosed with type 1 diabetes mellitus 2 years ago and since then is on insulin aspart sliding scale 3 times before meals, and insulin detemir 22 units daily. Patient reports that 2 days ago he drank heavily at a party and next morning he was having headache with nausea and multiple episodes of nonbloody vomiting with retching. He felt that he is having hangover effects from drinking so he continued to sleep but his condition continued to worsen with generalized body aches. He missed his insulin doses during this illness. In the morning he checked his blood sugar level which was very high and he took 15 units of his insulin without any relief of his symptoms and then then he decided to come to hospital.    On my evaluation, patient was looking very uncomfortable with severe generalized body aches but became comfortable after some time with a dose of morphine and Toradol.   He denied shortness of breath, cough, chest pain, diarrhea, dysuria or burning micturition. His examination was fairly unremarkable. Review of Systems   Constitutional: Positive for fatigue. Negative for fever. Respiratory: Negative for cough and shortness of breath. Cardiovascular: Negative for chest pain. Gastrointestinal: Positive for nausea and vomiting. Negative for abdominal pain, blood in stool and diarrhea. Genitourinary: Negative for dysuria and hematuria. Neurological: Negative for dizziness. PAST MEDICAL HISTORY     Past Medical History:   Diagnosis Date    Asthma     Carpal tunnel syndrome, bilateral 3/21/2013    Chronic back pain 3/21/2013       PAST SURGICAL HISTORY     Past Surgical History:   Procedure Laterality Date    NECK SURGERY N/A year of 2010       ALLERGIES     Bee venom and Penicillins    MEDICATIONS PRIOR TO ADMISSION     Prior to Admission medications    Medication Sig Start Date End Date Taking? Authorizing Provider   ibuprofen (ADVIL;MOTRIN) 800 MG tablet Take 1 tablet by mouth every 8 hours as needed for Pain 6/27/19   SID Carballo CNP   insulin detemir (LEVEMIR FLEXTOUCH) 100 UNIT/ML injection pen Inject 22 Units into the skin daily (with breakfast) 7/26/18   Marilu Pryor MD   aspirin 81 MG chewable tablet Take 1 tablet by mouth daily 7/25/18   Marilu Pryor MD   atorvastatin (LIPITOR) 20 MG tablet Take 1 tablet by mouth nightly 7/25/18   Marilu Pryor MD   lisinopril (PRINIVIL;ZESTRIL) 2.5 MG tablet Take 1 tablet by mouth daily 7/25/18   Marilu Pryor MD   insulin aspart (NOVOLOG FLEXPEN) 100 UNIT/ML injection pen Inject 3 Units into the skin 3 times daily (before meals) Use as per the scale provided 7/25/18   Marilu Pryor MD   fluticasone-salmeterol (ADVAIR DISKUS) 250-50 MCG/DOSE AEPB Inhale 1 puff into the lungs every 12 hours.  3/21/13   Yaya Andre MD   albuterol (PROVENTIL HFA) 108 (90 BASE) MCG/ACT inhaler Inhale 2 puffs into the lungs every are noted. Skin:  Warm and dry. Good color, turgor and pigmentation. No lesions or scars.   No cyanosis or clubbing  Neurological/Psychiatric: The patient's general behavior, level of consciousness, thought content and emotional status is normal.          INVESTIGATIONS     Laboratory Testing:     Recent Results (from the past 24 hour(s))   Venous Blood Gas, POC    Collection Time: 09/07/20  9:57 AM   Result Value Ref Range    pH, Demarco 7.349 7.320 - 7.430    pCO2, Demarco 22.6 (L) 41.0 - 51.0 mm Hg    pO2, Demarco 58.3 (H) 30.0 - 50.0 mm Hg    HCO3, Venous 12.4 (L) 22.0 - 29.0 mmol/L    Total CO2, Venous 13 (L) 23.0 - 30.0 mmol/L    Negative Base Excess, Demarco 11 (H) 0.0 - 2.0    Positive Base Excess, Demarco NOT REPORTED 0.0 - 3.0    O2 Sat, Demarco 89 (H) 60.0 - 85.0 %    O2 Device/Flow/% NOT REPORTED     Kayode Test NOT REPORTED     Sample Site NOT REPORTED     Mode NOT REPORTED     FIO2 NOT REPORTED     Pt Temp NOT REPORTED     POC pH Temp NOT REPORTED     POC pCO2 Temp NOT REPORTED mm Hg    POC pO2 Temp NOT REPORTED mm Hg   Hemoglobin and hematocrit, blood    Collection Time: 09/07/20  9:57 AM   Result Value Ref Range    POC Hemoglobin 16.4 13.5 - 17.5 g/dL    POC Hematocrit 48 41 - 53 %   Creatinine W/GFR Point of Care    Collection Time: 09/07/20  9:57 AM   Result Value Ref Range    POC Creatinine 0.94 0.51 - 1.19 mg/dL    GFR Comment >60 >60 mL/min    GFR Non-African American >60 >60 mL/min    GFR Comment         SODIUM (POC)    Collection Time: 09/07/20  9:57 AM   Result Value Ref Range    POC Sodium 128 (L) 138 - 146 mmol/L   POTASSIUM (POC)    Collection Time: 09/07/20  9:57 AM   Result Value Ref Range    POC Potassium 4.4 3.5 - 4.5 mmol/L   CHLORIDE (POC)    Collection Time: 09/07/20  9:57 AM   Result Value Ref Range    POC Chloride 104 98 - 107 mmol/L   CALCIUM, IONIC (POC)    Collection Time: 09/07/20  9:57 AM   Result Value Ref Range    POC Ionized Calcium 1.02 (L) 1.15 - 1.33 mmol/L   Lactic Acid, POC    Collection Time: 09/07/20  9:57 AM   Result Value Ref Range    POC Lactic Acid 1.33 0.56 - 1.39 mmol/L   POCT Glucose    Collection Time: 09/07/20  9:57 AM   Result Value Ref Range    POC Glucose 360 (H) 74 - 100 mg/dL   Anion Gap (Calc) POC    Collection Time: 09/07/20  9:57 AM   Result Value Ref Range    Anion Gap 12 7 - 16 mmol/L   CBC Auto Differential    Collection Time: 09/07/20 10:13 AM   Result Value Ref Range    WBC 20.2 (H) 3.5 - 11.3 k/uL    RBC 5.19 4.21 - 5.77 m/uL    Hemoglobin 15.9 13.0 - 17.0 g/dL    Hematocrit 47.3 40.7 - 50.3 %    MCV 91.1 82.6 - 102.9 fL    MCH 30.6 25.2 - 33.5 pg    MCHC 33.6 28.4 - 34.8 g/dL    RDW 13.1 11.8 - 14.4 %    Platelets 269 783 - 850 k/uL    MPV 11.3 8.1 - 13.5 fL    NRBC Automated 0.0 0.0 per 100 WBC    Differential Type NOT REPORTED     Seg Neutrophils 80 (H) 36 - 65 %    Lymphocytes 13 (L) 24 - 43 %    Monocytes 6 3 - 12 %    Eosinophils % 0 (L) 1 - 4 %    Basophils 0 0 - 2 %    Immature Granulocytes 1 (H) 0 %    Segs Absolute 16.16 (H) 1.50 - 8.10 k/uL    Absolute Lymph # 2.57 1.10 - 3.70 k/uL    Absolute Mono # 1.19 0.10 - 1.20 k/uL    Absolute Eos # 0.06 0.00 - 0.44 k/uL    Basophils Absolute 0.09 0.00 - 0.20 k/uL    Absolute Immature Granulocyte 0.13 0.00 - 0.30 k/uL    WBC Morphology NOT REPORTED     RBC Morphology NOT REPORTED     Platelet Estimate NOT REPORTED    Comprehensive Metabolic Panel w/ Reflex to MG    Collection Time: 09/07/20 10:13 AM   Result Value Ref Range    Glucose 349 (H) 70 - 99 mg/dL    BUN 19 6 - 20 mg/dL    CREATININE 0.98 0.70 - 1.20 mg/dL    Bun/Cre Ratio NOT REPORTED 9 - 20    Calcium 8.8 8.6 - 10.4 mg/dL    Sodium 132 (L) 135 - 144 mmol/L    Potassium 4.6 3.7 - 5.3 mmol/L    Chloride 96 (L) 98 - 107 mmol/L    CO2 10 (L) 20 - 31 mmol/L    Anion Gap 26 (H) 9 - 17 mmol/L    Alkaline Phosphatase 141 (H) 40 - 129 U/L     (H) 5 - 41 U/L    AST 92 (H) <40 U/L    Total Bilirubin 1.37 (H) 0.3 - 1.2 mg/dL    Total Protein 6.6 6.4 - 8.3 g/dL    Alb 4.1 3.5 - 5.2 g/dL    Albumin/Globulin Ratio 1.6 1.0 - 2.5    GFR Non-African American >60 >60 mL/min    GFR African American >60 >60 mL/min    GFR Comment          GFR Staging NOT REPORTED    Lipase    Collection Time: 09/07/20 10:13 AM   Result Value Ref Range    Lipase 9 (L) 13 - 60 U/L   Lactic Acid, Plasma    Collection Time: 09/07/20 10:13 AM   Result Value Ref Range    Lactic Acid NOT REPORTED mmol/L    Lactic Acid, Whole Blood 2.2 (H) 0.7 - 2.1 mmol/L   BETA-HYDROXYBUTYRATE    Collection Time: 09/07/20 10:13 AM   Result Value Ref Range    Beta-Hydroxybutyrate 7.19 (H) 0.02 - 0.27 mmol/L   CK    Collection Time: 09/07/20 10:13 AM   Result Value Ref Range    Total  39 - 308 U/L   Urinalysis Reflex to Culture    Collection Time: 09/07/20 10:15 AM    Specimen: Urine, clean catch   Result Value Ref Range    Color, UA YELLOW YELLOW    Turbidity UA CLEAR CLEAR    Glucose, Ur 3+ (A) NEGATIVE    Bilirubin Urine NEGATIVE NEGATIVE    Ketones, Urine LARGE (A) NEGATIVE    Specific Gravity, UA 1.032 (H) 1.005 - 1.030    Urine Hgb NEGATIVE NEGATIVE    pH, UA 5.0 5.0 - 8.0    Protein, UA TRACE (A) NEGATIVE    Urobilinogen, Urine Normal Normal    Nitrite, Urine NEGATIVE NEGATIVE    Leukocyte Esterase, Urine NEGATIVE NEGATIVE    Urinalysis Comments NOT REPORTED    Microscopic Urinalysis    Collection Time: 09/07/20 10:15 AM   Result Value Ref Range    -          WBC, UA None 0 - 5 /HPF    RBC, UA None 0 - 4 /HPF    Casts UA NOT REPORTED 0 - 8 /LPF    Crystals, UA NOT REPORTED None /HPF    Epithelial Cells UA None 0 - 5 /HPF    Renal Epithelial, UA NOT REPORTED 0 /HPF    Bacteria, UA NOT REPORTED None    Mucus, UA NOT REPORTED None    Trichomonas, UA NOT REPORTED None    Amorphous, UA NOT REPORTED None    Other Observations UA NOT REPORTED NOT REQ.     Yeast, UA NOT REPORTED None   POC Glucose Fingerstick    Collection Time: 09/07/20 11:09 AM   Result Value Ref Range    POC Glucose 386 (H) 75 - 110 mg/dL   POCT Glucose Collection Time: 09/07/20 11:11 AM   Result Value Ref Range    Glucose 386 mg/dL   POC Glucose Fingerstick    Collection Time: 09/07/20 12:03 PM   Result Value Ref Range    POC Glucose 389 (H) 75 - 110 mg/dL   POCT Glucose    Collection Time: 09/07/20 12:04 PM   Result Value Ref Range    Glucose 389 mg/dL   POC Glucose Fingerstick    Collection Time: 09/07/20  1:12 PM   Result Value Ref Range    POC Glucose 370 (H) 75 - 110 mg/dL   POCT Glucose    Collection Time: 09/07/20  1:13 PM   Result Value Ref Range    Glucose 370 mg/dL       Imaging:   Xr Chest Portable    Result Date: 9/7/2020  No significant abnormalities detected. ASSESSMENT & PLAN     ASSESSMENT / PLAN:     IMPRESSION  This is a 31 y.o. male with PMH of type 1 diabetes mellitus on insulin at home presented with nausea and multiple episodes of vomiting after heavy alcohol drinking and missing his insulin dose for 2-3 days. Diabetic ketoacidosis  -Anion gap metabolic acidosis with elevated beta hydroxybutyrate  -Start insulin drip that 0.1 units/kg.  -Aggressive fluid hydration with normal saline and potassium replacement  -Switch to 5% dextrose water and half normal saline when blood glucose is less than 250  -Check POC blood glucose every hour  -BMP every 4 hours  -Stop insulin drip if potassium is less than 3.3  -Stop replacing potassium if potassium is more than 5.3  -We will switch to subcu insulin when patient is able to eat, anion gap <15 twice consecutively, bicarb >18 twice consecutively and blood glucose <250    Type I diabetes mellitus on home NovoLog sliding scale and Levemir 22 units daily. Last HbA1c 8.3 as per 7/2018. Continue with insulin drip. Check HbA1c tomorrow a.m. DVT ppx: Lovenox  GI ppx: Not needed    PT/OT/SW: Ongoing  Discharge Planning: Ongoing    Jamiie Fong MD  Internal Medicine Resident, PGY-1  [facility];  [place], [place]  9/7/2020, 1:29 PM

## 2020-09-07 NOTE — ED NOTES
Pt screaming out in pain. Pt thrashing on the cot, c/o muscle cramping, pain, nausea. Girlfriend at bedside. Resident notified.       Donna Garg RN  09/07/20 7269 None known

## 2020-09-07 NOTE — ED PROVIDER NOTES
[facility] ED  Emergency Department  Emergency Medicine Resident Sign-out     Care of Shawn Torres was assumed from Dr. Anthony Phillips and is being seen for Hyperglycemia; Nausea; Emesis; and Headache  . The patient's initial evaluation and plan have been discussed with the prior provider who initially evaluated the patient.      EMERGENCY DEPARTMENT COURSE / MEDICAL DECISION MAKING:       MEDICATIONS GIVEN:  Orders Placed This Encounter   Medications    lactated ringers infusion 1,000 mL    ondansetron (ZOFRAN) injection 4 mg    DISCONTD: glucose (GLUTOSE) 40 % oral gel 15 g    DISCONTD: dextrose 50 % IV solution    DISCONTD: glucagon (rDNA) injection 1 mg    DISCONTD: dextrose 5 % solution    DISCONTD: insulin regular (HUMULIN R;NOVOLIN R) 100 Units in sodium chloride 0.9 % 100 mL infusion    DISCONTD: lactated ringers infusion 1,000 mL    DISCONTD: insulin regular (HUMULIN R;NOVOLIN R) 100 Units in sodium chloride 0.9 % 100 mL infusion    morphine injection 4 mg    ketorolac (TORADOL) injection 30 mg    promethazine (PHENERGAN) injection 25 mg    0.9 % sodium chloride bolus    DISCONTD: potassium chloride 20 mEq in dextrose 5 % and 0.9 % NaCl 1,000 mL infusion    DISCONTD: dextrose 5 % and 0.9 % NaCl with KCl 20 mEq infusion    ipratropium-albuterol (DUONEB) nebulizer solution 1 ampule    DISCONTD: budesonide-formoterol (SYMBICORT) 160-4.5 MCG/ACT inhaler 2 puff    enoxaparin (LOVENOX) injection 40 mg    dextrose 50 % IV solution    potassium chloride 10 mEq/100 mL IVPB (Peripheral Line)    magnesium sulfate 1 g in dextrose 5% 100 mL IVPB    OR Linked Order Group     sodium phosphate 10 mmol in dextrose 5 % 250 mL IVPB     sodium phosphate 15 mmol in dextrose 5 % 250 mL IVPB     sodium phosphate 20 mmol in dextrose 5 % 500 mL IVPB    DISCONTD: 0.9 % sodium chloride bolus    DISCONTD: 0.9 % sodium chloride infusion    dextrose 5 % and 0.45 % sodium chloride infusion  insulin regular (HUMULIN R;NOVOLIN R) 100 Units in sodium chloride 0.9 % 100 mL infusion    DISCONTD: potassium chloride 20 mEq in sodium chloride 0.9 % 1,000 mL infusion    DISCONTD: thiamine (B-1) 100 mg in sodium chloride 0.9 % 100 mL IVPB    DISCONTD: folic acid 1 mg in dextrose 5 % 50 mL IVPB    0.9% NaCl with KCl 20 mEq infusion    dextrose 5 % and 0.45 % NaCl 5-0.45 % infusion     BENY PARKS: cabinet override       LABS / RADIOLOGY:     Labs Reviewed   SODIUM (POC) - Abnormal; Notable for the following components:       Result Value    POC Sodium 128 (*)     All other components within normal limits   CALCIUM, IONIC (POC) - Abnormal; Notable for the following components:    POC Ionized Calcium 1.02 (*)     All other components within normal limits   CBC WITH AUTO DIFFERENTIAL - Abnormal; Notable for the following components:    WBC 20.2 (*)     Seg Neutrophils 80 (*)     Lymphocytes 13 (*)     Eosinophils % 0 (*)     Immature Granulocytes 1 (*)     Segs Absolute 16.16 (*)     All other components within normal limits   COMPREHENSIVE METABOLIC PANEL W/ REFLEX TO MG FOR LOW K - Abnormal; Notable for the following components:    Glucose 349 (*)     Sodium 132 (*)     Chloride 96 (*)     CO2 10 (*)     Anion Gap 26 (*)     Alkaline Phosphatase 141 (*)      (*)     AST 92 (*)     Total Bilirubin 1.37 (*)     All other components within normal limits   LIPASE - Abnormal; Notable for the following components:    Lipase 9 (*)     All other components within normal limits   URINE RT REFLEX TO CULTURE - Abnormal; Notable for the following components:    Glucose, Ur 3+ (*)     Ketones, Urine LARGE (*)     Specific Gravity, UA 1.032 (*)     Protein, UA TRACE (*)     All other components within normal limits   LACTIC ACID, PLASMA - Abnormal; Notable for the following components:    Lactic Acid, Whole Blood 2.2 (*)     All other components within normal limits   BETA-HYDROXYBUTYRATE - Abnormal; Notable for the following components:    Beta-Hydroxybutyrate 7.19 (*)     All other components within normal limits   VENOUS BLOOD GAS, POINT OF CARE - Abnormal; Notable for the following components:    pCO2, Demarco 22.6 (*)     pO2, Demarco 58.3 (*)     HCO3, Venous 12.4 (*)     Total CO2, Venous 13 (*)     Negative Base Excess, Demarco 11 (*)     O2 Sat, Demarco 89 (*)     All other components within normal limits   POCT GLUCOSE - Abnormal; Notable for the following components:    POC Glucose 360 (*)     All other components within normal limits   POC GLUCOSE FINGERSTICK - Abnormal; Notable for the following components:    POC Glucose 386 (*)     All other components within normal limits   POC GLUCOSE FINGERSTICK - Abnormal; Notable for the following components:    POC Glucose 389 (*)     All other components within normal limits   POC GLUCOSE FINGERSTICK - Abnormal; Notable for the following components:    POC Glucose 370 (*)     All other components within normal limits   POC GLUCOSE FINGERSTICK - Abnormal; Notable for the following components:    POC Glucose 274 (*)     All other components within normal limits   POC GLUCOSE FINGERSTICK - Abnormal; Notable for the following components:    POC Glucose 243 (*)     All other components within normal limits   POCT GLUCOSE - Normal   POCT GLUCOSE - Normal   POCT GLUCOSE - Normal   POCT GLUCOSE - Normal   POCT GLUCOSE - Normal   HGB/HCT   POTASSIUM (POC)   CHLORIDE (POC)   CK   TROPONIN   MICROSCOPIC URINALYSIS   BASIC METABOLIC PANEL   BASIC METABOLIC PANEL   BASIC METABOLIC PANEL   MAGNESIUM   MAGNESIUM   MAGNESIUM   PHOSPHORUS   PHOSPHORUS   PHOSPHORUS   CREATININE W/GFR POINT OF CARE   LACTIC ACID,POINT OF CARE   ANION GAP (CALC) POC   POCT GLUCOSE   POCT GLUCOSE   POCT GLUCOSE   POCT GLUCOSE   POCT GLUCOSE   POCT GLUCOSE   POCT GLUCOSE   POCT GLUCOSE   POCT GLUCOSE   POCT GLUCOSE   POCT GLUCOSE   POCT GLUCOSE   POCT GLUCOSE   POCT GLUCOSE   POCT GLUCOSE   POCT GLUCOSE   POCT GLUCOSE POCT GLUCOSE   POCT GLUCOSE   POCT GLUCOSE   POCT GLUCOSE   POCT GLUCOSE   POCT GLUCOSE   POCT GLUCOSE   POCT GLUCOSE   POCT GLUCOSE   POCT GLUCOSE   POCT GLUCOSE       Xr Chest Portable    Result Date: 9/7/2020  EXAMINATION: ONE XRAY VIEW OF THE CHEST 9/7/2020 10:31 am COMPARISON: 10/01/2015 HISTORY: ORDERING SYSTEM PROVIDED HISTORY: Tachycardia, suspected DKA TECHNOLOGIST PROVIDED HISTORY: Tachycardia, suspected DKA Reason for Exam: uprt port Acuity: Unknown Type of Exam: Unknown FINDINGS: The cardiomediastinal silhouette is normal. No focal consolidation. The pulmonary vascularity is normal.  There is no pleural effusion or pneumothorax. Osseous structures grossly intact. No significant abnormalities detected. RECENT VITALS:     Temp: 97.8 °F (36.6 °C),  Pulse: 89, Resp: 18, BP: 122/62, SpO2: 96 %    ED Course as of Sep 07 1605   Mon Sep 07, 2020   1105 Spoke to internal medicine who agreed to admit patient under Dr. Tayler Johnston. Recommended starting insulin infusion after next glucose check. [TS]      ED Course User Index  [TS] Giovanna Dunlap MD       This patient is a 31 y.o. Male with hyperglycemia nausea, vomiting. Concern for DKA. Gap of 26 with respiratory alkalosis. Started on insulin drip. Potassium 4.6. Recent alcohol use. Did not use his insulin as prescribed. Admitted to stepdown medicine. OUTSTANDING TASKS / RECOMMENDATIONS:    1. Admit     FINAL IMPRESSION:     1.  Type 1 diabetes mellitus with ketoacidosis without coma ([facility] [address]        DISPOSITION:         DISPOSITION:  []  Discharge   []  Transfer -    [x]  Admission -     []  Against Medical Advice   []  Eloped   FOLLOW-UP: Carlos Luque MD  [address]  [phone]           DISCHARGE MEDICATIONS: New Prescriptions    No medications on file          Ajit Alfaro DO  Emergency Medicine Resident  [address] [facility]       Ajit Alfaro, [address] [place] [address]  Resident  09/07/20 [id number]

## 2020-09-07 NOTE — ED PROVIDER NOTES
[facility]  ED  eMERGENCY dEPARTMENT eNCOUnter   Attending Attestation     Pt Name: Kathe Paul  MRN: [id number]  [place] 1990  Date of evaluation: 9/7/20       Kathe Paul is a 31 y.o. male who presents with Hyperglycemia; Nausea; Emesis; and Headache      History: Patient presents with elevated blood sugar. Patient says that he has nausea, vomiting, headache, body aches. Patient was drinking significant amounts of alcohol and his blood sugar got under control. It read high on the meter today. Patient gave himself 15 units of insulin but has not been giving himself insulin at home prior to this. Exam: Heart rate mildly elevated. Lungs are clear to auscultation bilaterally. Abdomen is soft, mild diffuse tenderness. Plan for DKA work-up and probable admission. I performed a history and physical examination of the patient and discussed management with the resident. I reviewed the residents note and agree with the documented findings and plan of care. Any areas of disagreement are noted on the chart. I was personally present for the key portions of any procedures. I have documented in the chart those procedures where I was not present during the key portions. I have personally reviewed all images and agree with the resident's interpretation. I have reviewed the emergency nurses triage note. I agree with the chief complaint, past medical history, past surgical history, allergies, medications, social and family history as documented unless otherwise noted below. Documentation of the HPI, Physical Exam and Medical Decision Making performed by medical students or scribes is based on my personal performance of the HPI, PE and MDM. For Phys Assistant/ Nurse Practitioner cases/documentation I have had a face to face evaluation of this patient and have completed at least one if not all key elements of the E/M (history, physical exam, and MDM).  Additional findings are as noted.    For APC cases I have personally evaluated and examined the patient in conjunction with the APC and agree with the treatment plan and disposition of the patient as recorded by the APC.     Lissa Tilley MD  Attending Emergency  Physician       Guillaume Winters MD  09/07/20 [id number]

## 2020-09-07 NOTE — ED NOTES
Critical labs results received from lab. 3+ glucose in urine along with ketones.       Sunita Crowell RN  09/07/20 [id number]

## 2020-09-07 NOTE — ED NOTES
Pt continues to be nausea's, dry heaving with body aches, cramping in his arms and back. Pt states headache remains, pt calling out in pain. Resident notified.       Duncan Burton RN  09/07/20 [id number]

## 2020-09-07 NOTE — ED PROVIDER NOTES
[facility]  ED  Emergency Department Encounter  EmergencyMedicine Resident     Pt Myke Bauer  MRN: [id number]  [place] 1990  Date of evaluation: 9/7/20  PCP:  Drew Bray MD    CHIEF COMPLAINT       No chief complaint on file. HISTORY OF PRESENT ILLNESS  (Location/Symptom, Timing/Onset, Context/Setting, Quality, Duration, Modifying Factors, Severity.)      Idalia Setting is a 31 y.o. male who presents to the emergency department with a 2-day history of nausea and 10 episodes of vomiting per day with associated constipation. Patient states that he has type 1 diabetes and took his blood sugar this morning and it read over 500 or possibly 600 at home. He took 15 units of NovoLog prior to arrival in the emergency department where his blood sugar was 360. He states that he has not taken any of his insulin since 2 days ago when he was at a party and consumed heavy amounts of alcohol. The next day he woke up with what he thought was a hangover but the symptoms worsened over the past day and he now comes to the ED for evaluation. States that he has never had DKA before. He manages his sugar at home with subcutaneous insulin and does not wear a pump. Denies fever, chills, chest pain, shortness of breath, problems with urination, or new numbness or tingling anywhere. PAST MEDICAL / SURGICAL / SOCIAL / FAMILY HISTORY      has a past medical history of Asthma, Carpal tunnel syndrome, bilateral, and Chronic back pain. has a past surgical history that includes Neck surgery (N/A, year of 2010).     Social History     Socioeconomic History    Marital status: Single     Spouse name: Not on file    Number of children: Not on file    Years of education: Not on file    Highest education level: Not on file   Occupational History    Not on file   Social Needs    Financial resource strain: Not on file    Food insecurity     Worry: Not on file     Inability: Not on file   Jerica Gregorio Transportation needs     Medical: Not on file     Non-medical: Not on file   Tobacco Use    Smoking status: Current Every Day Smoker     Packs/day: 1.50     Years: 4.00     Pack years: 6.00     Types: Cigarettes    Smokeless tobacco: Never Used   Substance and Sexual Activity    Alcohol use: Yes    Drug use: Yes     Types: Marijuana    Sexual activity: Yes     Partners: Female   Lifestyle    Physical activity     Days per week: Not on file     Minutes per session: Not on file    Stress: Not on file   Relationships    Social connections     Talks on phone: Not on file     Gets together: Not on file     Attends Rastafarian service: Not on file     Active member of club or organization: Not on file     Attends meetings of clubs or organizations: Not on file     Relationship status: Not on file    Intimate partner violence     Fear of current or ex partner: Not on file     Emotionally abused: Not on file     Physically abused: Not on file     Forced sexual activity: Not on file   Other Topics Concern    Not on file   Social History Narrative    Not on file       Family History   Problem Relation Age of Onset    Schizophrenia Mother     Glaucoma Mother     Asthma Mother        Allergies:  Bee venom and Penicillins    Home Medications:  Prior to Admission medications    Medication Sig Start Date End Date Taking?  Authorizing Provider   ibuprofen (ADVIL;MOTRIN) 800 MG tablet Take 1 tablet by mouth every 8 hours as needed for Pain 6/27/19   SID Carballo CNP   insulin detemir (LEVEMIR FLEXTOUCH) 100 UNIT/ML injection pen Inject 22 Units into the skin daily (with breakfast) 7/26/18   Mary Walters MD   aspirin 81 MG chewable tablet Take 1 tablet by mouth daily 7/25/18   Mary Walters MD   atorvastatin (LIPITOR) 20 MG tablet Take 1 tablet by mouth nightly 7/25/18   Mary Walters MD   lisinopril (PRINIVIL;ZESTRIL) 2.5 MG tablet Take 1 tablet by mouth daily 7/25/18   Mary Walters MD   insulin aspart (NOVOLOG FLEXPEN) 100 UNIT/ML injection pen Inject 3 Units into the skin 3 times daily (before meals) Use as per the scale provided 7/25/18   Anastacio Álvarez MD   fluticasone-salmeterol (ADVAIR DISKUS) 250-50 MCG/DOSE AEPB Inhale 1 puff into the lungs every 12 hours. 3/21/13   Yaya Andre MD   albuterol (PROVENTIL HFA) 108 (90 BASE) MCG/ACT inhaler Inhale 2 puffs into the lungs every 4 hours as needed for Wheezing. 3/21/13   Milagro Wiseman MD       REVIEW OF SYSTEMS    (2-9 systems for level 4, 10 or more for level 5)      Review of Systems   Constitutional: Negative for chills and fever. HENT: Negative for ear pain, hearing loss and sore throat. Eyes: Negative for visual disturbance. Respiratory: Negative for shortness of breath (None at present). Cardiovascular: Negative for chest pain. Gastrointestinal: Positive for abdominal pain and constipation. Negative for diarrhea, nausea and vomiting. Genitourinary: Negative for difficulty urinating and dysuria. Musculoskeletal: Negative for arthralgias and myalgias. Neurological: Negative for numbness. Psychiatric/Behavioral: Negative for agitation and confusion. PHYSICAL EXAM   (up to 7 for level 4, 8 or more for level 5)      INITIAL VITALS:   BP (!) 143/77   Pulse 101   Temp 97.8 °F (36.6 °C) (Cerebral)   Resp 18   Ht 5' 9\" (1.753 m)   Wt 190 lb (86.2 kg)   SpO2 99%   BMI 28.06 kg/m²     Physical Exam  Vitals signs and nursing note reviewed. Constitutional:       General: He is not in acute distress. Appearance: Normal appearance. He is well-developed. He is ill-appearing. He is not diaphoretic. HENT:      Head: Normocephalic and atraumatic. Right Ear: External ear normal.      Left Ear: External ear normal.      Nose: Nose normal.      Mouth/Throat:      Mouth: Mucous membranes are moist.   Eyes:      Extraocular Movements: Extraocular movements intact.       Conjunctiva/sclera: Conjunctivae normal.   Neck: Musculoskeletal: Normal range of motion and neck supple. Trachea: No tracheal deviation. Cardiovascular:      Rate and Rhythm: Regular rhythm. Tachycardia present. Heart sounds: Normal heart sounds. No murmur. No friction rub. No gallop. Pulmonary:      Effort: Pulmonary effort is normal. No respiratory distress. Breath sounds: Normal breath sounds. No wheezing, rhonchi or rales. Comments: Tachypnea noted  Abdominal:      General: Abdomen is flat. There is no distension. Palpations: Abdomen is soft. There is no mass. Tenderness: There is no abdominal tenderness. There is no guarding or rebound. Musculoskeletal: Normal range of motion. General: No swelling, deformity or signs of injury. Skin:     General: Skin is warm and dry. Capillary Refill: Capillary refill takes less than 2 seconds. Coloration: Skin is not jaundiced. Findings: No bruising or lesion. Neurological:      General: No focal deficit present. Mental Status: He is alert and oriented to person, place, and time. Mental status is at baseline. Motor: No abnormal muscle tone.          DIFFERENTIAL  DIAGNOSIS     PLAN (LABS / IMAGING / EKG):  Orders Placed This Encounter   Procedures    XR CHEST PORTABLE    Hemoglobin and hematocrit, blood    SODIUM (POC)    POTASSIUM (POC)    CHLORIDE (POC)    CALCIUM, IONIC (POC)    CBC Auto Differential    Comprehensive Metabolic Panel w/ Reflex to MG    Lipase    Urinalysis Reflex to Culture    Lactic Acid, Plasma    BETA-HYDROXYBUTYRATE    CK    HYPOGLYCEMIA TREATMENT: blood glucose less than 50 mg/dL and patient  ALERT and TOLERATING PO    HYPOGLYCEMIA TREATMENT: blood glucose less than 70 mg/dL and patient ALERT and TOLERATING PO    HYPOGLYCEMIA TREATMENT: blood glucose less than 70 mg/dL and patient NOT ALERT or NPO    Inpatient consult to Internal Medicine    Venous Blood Gas, POC    Creatinine W/GFR Point of Care    Lactic 5.77 m/uL    Hemoglobin 15.9 13.0 - 17.0 g/dL    Hematocrit 47.3 40.7 - 50.3 %    MCV 91.1 82.6 - 102.9 fL    MCH 30.6 25.2 - 33.5 pg    MCHC 33.6 28.4 - 34.8 g/dL    RDW 13.1 11.8 - 14.4 %    Platelets 916 880 - 761 k/uL    MPV 11.3 8.1 - 13.5 fL    NRBC Automated 0.0 0.0 per 100 WBC    Differential Type NOT REPORTED     Seg Neutrophils 80 (H) 36 - 65 %    Lymphocytes 13 (L) 24 - 43 %    Monocytes 6 3 - 12 %    Eosinophils % 0 (L) 1 - 4 %    Basophils 0 0 - 2 %    Immature Granulocytes 1 (H) 0 %    Segs Absolute 16.16 (H) 1.50 - 8.10 k/uL    Absolute Lymph # 2.57 1.10 - 3.70 k/uL    Absolute Mono # 1.19 0.10 - 1.20 k/uL    Absolute Eos # 0.06 0.00 - 0.44 k/uL    Basophils Absolute 0.09 0.00 - 0.20 k/uL    Absolute Immature Granulocyte 0.13 0.00 - 0.30 k/uL    WBC Morphology NOT REPORTED     RBC Morphology NOT REPORTED     Platelet Estimate NOT REPORTED    Comprehensive Metabolic Panel w/ Reflex to MG   Result Value Ref Range    Glucose 349 (H) 70 - 99 mg/dL    BUN 19 6 - 20 mg/dL    CREATININE 0.98 0.70 - 1.20 mg/dL    Bun/Cre Ratio NOT REPORTED 9 - 20    Calcium 8.8 8.6 - 10.4 mg/dL    Sodium 132 (L) 135 - 144 mmol/L    Potassium 4.6 3.7 - 5.3 mmol/L    Chloride 96 (L) 98 - 107 mmol/L    CO2 10 (L) 20 - 31 mmol/L    Anion Gap 26 (H) 9 - 17 mmol/L    Alkaline Phosphatase 141 (H) 40 - 129 U/L     (H) 5 - 41 U/L    AST 92 (H) <40 U/L    Total Bilirubin 1.37 (H) 0.3 - 1.2 mg/dL    Total Protein 6.6 6.4 - 8.3 g/dL    Alb 4.1 3.5 - 5.2 g/dL    Albumin/Globulin Ratio 1.6 1.0 - 2.5    GFR Non-African American >60 >60 mL/min    GFR African American >60 >60 mL/min    GFR Comment          GFR Staging NOT REPORTED    Lipase   Result Value Ref Range    Lipase 9 (L) 13 - 60 U/L   Lactic Acid, Plasma   Result Value Ref Range    Lactic Acid NOT REPORTED mmol/L    Lactic Acid, Whole Blood 2.2 (H) 0.7 - 2.1 mmol/L   BETA-HYDROXYBUTYRATE   Result Value Ref Range    Beta-Hydroxybutyrate 7.19 (H) 0.02 - 0.27 mmol/L   Venous Blood Gas, POC   Result Value Ref Range    pH, Demarco 7.349 7.320 - 7.430    pCO2, Demarco 22.6 (L) 41.0 - 51.0 mm Hg    pO2, Demarco 58.3 (H) 30.0 - 50.0 mm Hg    HCO3, Venous 12.4 (L) 22.0 - 29.0 mmol/L    Total CO2, Venous 13 (L) 23.0 - 30.0 mmol/L    Negative Base Excess, Demarco 11 (H) 0.0 - 2.0    Positive Base Excess, Demarco NOT REPORTED 0.0 - 3.0    O2 Sat, Demarco 89 (H) 60.0 - 85.0 %    O2 Device/Flow/% NOT REPORTED     Kayode Test NOT REPORTED     Sample Site NOT REPORTED     Mode NOT REPORTED     FIO2 NOT REPORTED     Pt Temp NOT REPORTED     POC pH Temp NOT REPORTED     POC pCO2 Temp NOT REPORTED mm Hg    POC pO2 Temp NOT REPORTED mm Hg   Creatinine W/GFR Point of Care   Result Value Ref Range    POC Creatinine 0.94 0.51 - 1.19 mg/dL    GFR Comment >60 >60 mL/min    GFR Non-African American >60 >60 mL/min    GFR Comment         Lactic Acid, POC   Result Value Ref Range    POC Lactic Acid 1.33 0.56 - 1.39 mmol/L   POCT Glucose   Result Value Ref Range    POC Glucose 360 (H) 74 - 100 mg/dL   Anion Gap (Calc) POC   Result Value Ref Range    Anion Gap 12 7 - 16 mmol/L   POCT Glucose   Result Value Ref Range    Glucose 386 mg/dL   POC Glucose Fingerstick   Result Value Ref Range    POC Glucose 386 (H) 75 - 110 mg/dL       IMPRESSION: Raul Bella is a 31 y.o. male who presents to the emergency department with a 2-day history of worsening nausea and vomiting. On examination he is tachycardic and tachypneic but afebrile, thin appearing and pale. Suspicion high for DKA given history and presentation. Will obtain VBG, EKG, CBC, CMP, lipase, lactate, beta hydroxybutyrate, start fluids and initially manage conservatively with fluid resuscitation given low DKA blood sugar 360. Likely admission. RADIOLOGY:  No results found.     EKG  EKG Interpretation    Interpreted by emergency department physician    Rhythm: normal sinus   Rate: 97  Axis: normal  Ectopy: none  Conduction: normal, VT interval 134, QRS 96, QTc 472 with good R wave progression in the lateral leads  ST Segments: no acute change  T Waves: no acute change  Q Waves: none    Clinical Impression: no acute changes and normal EKG, no prior EKG for comparison    Joaquin Schumacher MD    All EKG's are interpreted by the Emergency Department Physician who either signs or co-signs this chart in the absence of a cardiologist.    EMERGENCY DEPARTMENT COURSE:  ED Course as of Sep 07 1143   Mon Sep 07, 2020   1105 Spoke to internal medicine who agreed to admit patient under Dr. Pa Markham. Recommended starting insulin infusion after next glucose check. [TS]      ED Course User Index  [TS] Elizabeth Morrison MD       PROCEDURES:  None    CONSULTS:  IP CONSULT TO INTERNAL MEDICINE    CRITICAL CARE:  Please see attending note. FINAL IMPRESSION      1. Type 1 diabetes mellitus with ketoacidosis without coma [facility])          DISPOSITION / PLAN     DISPOSITION Admitted 09/07/2020 11:14:30 AM      PATIENT REFERRED TO:  Ilene Madrid MD  [address]  [phone]            DISCHARGE MEDICATIONS:  New Prescriptions    No medications on file       Elizabeth Morrison MD  Emergency Medicine Resident    This patient was evaluated in the Emergency Department for symptoms described in the history of present illness. He/she was evaluated in the context of the global COVID-19 pandemic, which necessitated consideration that the patient might be at risk for infection with the SARS-CoV-2 virus that causes COVID-19. Institutional protocols and algorithms that pertain to the evaluation of patients at risk for COVID-19 are in a state of rapid change based on information released by regulatory bodies including the CDC and federal and state organizations. These policies and algorithms were followed during the patient's care in the ED.     (Please note that portions of thisnote were completed with a voice recognition program.  Efforts were made to edit the dictations but occasionally words are mis-transcribed.)        Aren Alvares MD  Resident  09/07/20 [id number]       Aren Alvares MD  Resident  09/07/20 [id number]

## 2020-09-08 VITALS
TEMPERATURE: 97.5 F | BODY MASS INDEX: 26.45 KG/M2 | DIASTOLIC BLOOD PRESSURE: 73 MMHG | WEIGHT: 178.57 LBS | HEIGHT: 69 IN | SYSTOLIC BLOOD PRESSURE: 130 MMHG | HEART RATE: 73 BPM | RESPIRATION RATE: 18 BRPM | OXYGEN SATURATION: 100 %

## 2020-09-08 LAB
ABSOLUTE EOS #: 0.19 K/UL (ref 0–0.44)
ABSOLUTE IMMATURE GRANULOCYTE: 0.03 K/UL (ref 0–0.3)
ABSOLUTE LYMPH #: 1.99 K/UL (ref 1.1–3.7)
ABSOLUTE MONO #: 0.66 K/UL (ref 0.1–1.2)
ALBUMIN SERPL-MCNC: 3.1 G/DL (ref 3.5–5.2)
ALBUMIN/GLOBULIN RATIO: 1.9 (ref 1–2.5)
ALP BLD-CCNC: 92 U/L (ref 40–129)
ALT SERPL-CCNC: 82 U/L (ref 5–41)
ANION GAP SERPL CALCULATED.3IONS-SCNC: 10 MMOL/L (ref 9–17)
ANION GAP SERPL CALCULATED.3IONS-SCNC: 9 MMOL/L (ref 9–17)
AST SERPL-CCNC: 50 U/L
BASOPHILS # BLD: 1 % (ref 0–2)
BASOPHILS ABSOLUTE: 0.04 K/UL (ref 0–0.2)
BILIRUB SERPL-MCNC: 0.51 MG/DL (ref 0.3–1.2)
BILIRUBIN DIRECT: 0.18 MG/DL
BILIRUBIN, INDIRECT: 0.33 MG/DL (ref 0–1)
BUN BLDV-MCNC: 14 MG/DL (ref 6–20)
BUN BLDV-MCNC: 16 MG/DL (ref 6–20)
BUN/CREAT BLD: ABNORMAL (ref 9–20)
BUN/CREAT BLD: ABNORMAL (ref 9–20)
CALCIUM SERPL-MCNC: 7.7 MG/DL (ref 8.6–10.4)
CALCIUM SERPL-MCNC: 8 MG/DL (ref 8.6–10.4)
CHLORIDE BLD-SCNC: 109 MMOL/L (ref 98–107)
CHLORIDE BLD-SCNC: 110 MMOL/L (ref 98–107)
CO2: 17 MMOL/L (ref 20–31)
CO2: 19 MMOL/L (ref 20–31)
CREAT SERPL-MCNC: 0.84 MG/DL (ref 0.7–1.2)
CREAT SERPL-MCNC: 0.86 MG/DL (ref 0.7–1.2)
DIFFERENTIAL TYPE: NORMAL
EOSINOPHILS RELATIVE PERCENT: 3 % (ref 1–4)
ESTIMATED AVERAGE GLUCOSE: 243 MG/DL
GFR AFRICAN AMERICAN: >60 ML/MIN
GFR AFRICAN AMERICAN: >60 ML/MIN
GFR NON-AFRICAN AMERICAN: >60 ML/MIN
GFR NON-AFRICAN AMERICAN: >60 ML/MIN
GFR SERPL CREATININE-BSD FRML MDRD: ABNORMAL ML/MIN/{1.73_M2}
GLOBULIN: ABNORMAL G/DL (ref 1.5–3.8)
GLUCOSE BLD-MCNC: 122 MG/DL (ref 75–110)
GLUCOSE BLD-MCNC: 123 MG/DL (ref 75–110)
GLUCOSE BLD-MCNC: 127 MG/DL (ref 70–99)
GLUCOSE BLD-MCNC: 150 MG/DL (ref 75–110)
GLUCOSE BLD-MCNC: 167 MG/DL (ref 70–99)
GLUCOSE BLD-MCNC: 183 MG/DL (ref 75–110)
GLUCOSE BLD-MCNC: 255 MG/DL (ref 75–110)
GLUCOSE BLD-MCNC: 448 MG/DL (ref 75–110)
HBA1C MFR BLD: 10.1 % (ref 4–6)
HCT VFR BLD CALC: 42.5 % (ref 40.7–50.3)
HEMOGLOBIN: 13.6 G/DL (ref 13–17)
IMMATURE GRANULOCYTES: 0 %
LYMPHOCYTES # BLD: 28 % (ref 24–43)
MAGNESIUM: 1.8 MG/DL (ref 1.6–2.6)
MCH RBC QN AUTO: 30.6 PG (ref 25.2–33.5)
MCHC RBC AUTO-ENTMCNC: 32 G/DL (ref 28.4–34.8)
MCV RBC AUTO: 95.5 FL (ref 82.6–102.9)
MONOCYTES # BLD: 9 % (ref 3–12)
NRBC AUTOMATED: 0 PER 100 WBC
PDW BLD-RTO: 13.2 % (ref 11.8–14.4)
PHOSPHORUS: 2.2 MG/DL (ref 2.5–4.5)
PLATELET # BLD: 171 K/UL (ref 138–453)
PLATELET ESTIMATE: NORMAL
PMV BLD AUTO: 10.6 FL (ref 8.1–13.5)
POTASSIUM SERPL-SCNC: 3.8 MMOL/L (ref 3.7–5.3)
POTASSIUM SERPL-SCNC: 3.9 MMOL/L (ref 3.7–5.3)
RBC # BLD: 4.45 M/UL (ref 4.21–5.77)
RBC # BLD: NORMAL 10*6/UL
SEG NEUTROPHILS: 60 % (ref 36–65)
SEGMENTED NEUTROPHILS ABSOLUTE COUNT: 4.33 K/UL (ref 1.5–8.1)
SODIUM BLD-SCNC: 137 MMOL/L (ref 135–144)
SODIUM BLD-SCNC: 137 MMOL/L (ref 135–144)
TOTAL PROTEIN: 4.7 G/DL (ref 6.4–8.3)
WBC # BLD: 7.2 K/UL (ref 3.5–11.3)
WBC # BLD: NORMAL 10*3/UL

## 2020-09-08 PROCEDURE — 83036 HEMOGLOBIN GLYCOSYLATED A1C: CPT

## 2020-09-08 PROCEDURE — 83735 ASSAY OF MAGNESIUM: CPT

## 2020-09-08 PROCEDURE — 99238 HOSP IP/OBS DSCHRG MGMT 30/<: CPT | Performed by: INTERNAL MEDICINE

## 2020-09-08 PROCEDURE — 84100 ASSAY OF PHOSPHORUS: CPT

## 2020-09-08 PROCEDURE — 80076 HEPATIC FUNCTION PANEL: CPT

## 2020-09-08 PROCEDURE — C9113 INJ PANTOPRAZOLE SODIUM, VIA: HCPCS | Performed by: STUDENT IN AN ORGANIZED HEALTH CARE EDUCATION/TRAINING PROGRAM

## 2020-09-08 PROCEDURE — 85025 COMPLETE CBC W/AUTO DIFF WBC: CPT

## 2020-09-08 PROCEDURE — 6370000000 HC RX 637 (ALT 250 FOR IP): Performed by: STUDENT IN AN ORGANIZED HEALTH CARE EDUCATION/TRAINING PROGRAM

## 2020-09-08 PROCEDURE — 6360000002 HC RX W HCPCS: Performed by: STUDENT IN AN ORGANIZED HEALTH CARE EDUCATION/TRAINING PROGRAM

## 2020-09-08 PROCEDURE — 80048 BASIC METABOLIC PNL TOTAL CA: CPT

## 2020-09-08 PROCEDURE — 2580000003 HC RX 258: Performed by: STUDENT IN AN ORGANIZED HEALTH CARE EDUCATION/TRAINING PROGRAM

## 2020-09-08 PROCEDURE — 36415 COLL VENOUS BLD VENIPUNCTURE: CPT

## 2020-09-08 PROCEDURE — 82947 ASSAY GLUCOSE BLOOD QUANT: CPT

## 2020-09-08 RX ORDER — INSULIN GLARGINE 100 [IU]/ML
20 INJECTION, SOLUTION SUBCUTANEOUS NIGHTLY
Status: DISCONTINUED | OUTPATIENT
Start: 2020-09-08 | End: 2020-09-08

## 2020-09-08 RX ORDER — THIAMINE MONONITRATE (VIT B1) 100 MG
100 TABLET ORAL DAILY
Status: DISCONTINUED | OUTPATIENT
Start: 2020-09-08 | End: 2020-09-08 | Stop reason: HOSPADM

## 2020-09-08 RX ORDER — PANTOPRAZOLE SODIUM 40 MG/1
40 TABLET, DELAYED RELEASE ORAL
Qty: 30 TABLET | Refills: 3 | Status: SHIPPED | OUTPATIENT
Start: 2020-09-09

## 2020-09-08 RX ORDER — PANTOPRAZOLE SODIUM 40 MG/10ML
40 INJECTION, POWDER, LYOPHILIZED, FOR SOLUTION INTRAVENOUS ONCE
Status: COMPLETED | OUTPATIENT
Start: 2020-09-08 | End: 2020-09-08

## 2020-09-08 RX ORDER — LISINOPRIL 2.5 MG/1
2.5 TABLET ORAL DAILY
Qty: 30 TABLET | Refills: 3 | Status: SHIPPED | OUTPATIENT
Start: 2020-09-08

## 2020-09-08 RX ORDER — SODIUM CHLORIDE, SODIUM LACTATE, POTASSIUM CHLORIDE, CALCIUM CHLORIDE 600; 310; 30; 20 MG/100ML; MG/100ML; MG/100ML; MG/100ML
INJECTION, SOLUTION INTRAVENOUS CONTINUOUS
Status: DISCONTINUED | OUTPATIENT
Start: 2020-09-08 | End: 2020-09-08

## 2020-09-08 RX ORDER — SODIUM CHLORIDE 9 MG/ML
10 INJECTION INTRAVENOUS ONCE
Status: COMPLETED | OUTPATIENT
Start: 2020-09-08 | End: 2020-09-08

## 2020-09-08 RX ORDER — SODIUM CHLORIDE, SODIUM LACTATE, POTASSIUM CHLORIDE, CALCIUM CHLORIDE 600; 310; 30; 20 MG/100ML; MG/100ML; MG/100ML; MG/100ML
INJECTION, SOLUTION INTRAVENOUS CONTINUOUS
Status: DISCONTINUED | OUTPATIENT
Start: 2020-09-08 | End: 2020-09-08 | Stop reason: HOSPADM

## 2020-09-08 RX ORDER — INSULIN DETEMIR 100 [IU]/ML
25 INJECTION, SOLUTION SUBCUTANEOUS
Qty: 22.5 ML | Refills: 3 | Status: ON HOLD | OUTPATIENT
Start: 2020-09-08 | End: 2020-11-04 | Stop reason: HOSPADM

## 2020-09-08 RX ORDER — DEXTROSE MONOHYDRATE 50 MG/ML
100 INJECTION, SOLUTION INTRAVENOUS PRN
Status: DISCONTINUED | OUTPATIENT
Start: 2020-09-08 | End: 2020-09-08 | Stop reason: HOSPADM

## 2020-09-08 RX ORDER — LANOLIN ALCOHOL/MO/W.PET/CERES
100 CREAM (GRAM) TOPICAL DAILY
Qty: 30 TABLET | Refills: 3 | Status: SHIPPED | OUTPATIENT
Start: 2020-09-09

## 2020-09-08 RX ORDER — PANTOPRAZOLE SODIUM 40 MG/1
40 TABLET, DELAYED RELEASE ORAL
Status: DISCONTINUED | OUTPATIENT
Start: 2020-09-09 | End: 2020-09-08 | Stop reason: HOSPADM

## 2020-09-08 RX ORDER — DEXTROSE MONOHYDRATE 25 G/50ML
12.5 INJECTION, SOLUTION INTRAVENOUS PRN
Status: DISCONTINUED | OUTPATIENT
Start: 2020-09-08 | End: 2020-09-08 | Stop reason: HOSPADM

## 2020-09-08 RX ORDER — FOLIC ACID 1 MG/1
1 TABLET ORAL DAILY
Status: DISCONTINUED | OUTPATIENT
Start: 2020-09-08 | End: 2020-09-08 | Stop reason: HOSPADM

## 2020-09-08 RX ORDER — INSULIN GLARGINE 100 [IU]/ML
20 INJECTION, SOLUTION SUBCUTANEOUS NIGHTLY
Status: DISCONTINUED | OUTPATIENT
Start: 2020-09-08 | End: 2020-09-08 | Stop reason: HOSPADM

## 2020-09-08 RX ORDER — INSULIN GLARGINE 100 [IU]/ML
20 INJECTION, SOLUTION SUBCUTANEOUS ONCE
Status: COMPLETED | OUTPATIENT
Start: 2020-09-08 | End: 2020-09-08

## 2020-09-08 RX ORDER — PEN NEEDLE, DIABETIC 31 G X1/4"
1 NEEDLE, DISPOSABLE MISCELLANEOUS DAILY
Qty: 100 EACH | Refills: 3 | Status: SHIPPED | OUTPATIENT
Start: 2020-09-08

## 2020-09-08 RX ORDER — NICOTINE POLACRILEX 4 MG
15 LOZENGE BUCCAL PRN
Status: DISCONTINUED | OUTPATIENT
Start: 2020-09-08 | End: 2020-09-08 | Stop reason: HOSPADM

## 2020-09-08 RX ORDER — FOLIC ACID 1 MG/1
1 TABLET ORAL DAILY
Qty: 30 TABLET | Refills: 3 | Status: ON HOLD | OUTPATIENT
Start: 2020-09-09 | End: 2021-03-20

## 2020-09-08 RX ADMIN — SODIUM CHLORIDE, POTASSIUM CHLORIDE, SODIUM LACTATE AND CALCIUM CHLORIDE: 600; 310; 30; 20 INJECTION, SOLUTION INTRAVENOUS at 04:03

## 2020-09-08 RX ADMIN — Medication 100 MG: at 08:16

## 2020-09-08 RX ADMIN — Medication 10 ML: at 11:28

## 2020-09-08 RX ADMIN — INSULIN LISPRO 1 UNITS: 100 INJECTION, SOLUTION INTRAVENOUS; SUBCUTANEOUS at 08:15

## 2020-09-08 RX ADMIN — FOLIC ACID 1 MG: 1 TABLET ORAL at 08:16

## 2020-09-08 RX ADMIN — PANTOPRAZOLE SODIUM 40 MG: 40 INJECTION, POWDER, FOR SOLUTION INTRAVENOUS at 11:27

## 2020-09-08 RX ADMIN — INSULIN GLARGINE 20 UNITS: 100 INJECTION, SOLUTION SUBCUTANEOUS at 02:17

## 2020-09-08 RX ADMIN — INSULIN LISPRO 12 UNITS: 100 INJECTION, SOLUTION INTRAVENOUS; SUBCUTANEOUS at 12:52

## 2020-09-08 RX ADMIN — SODIUM CHLORIDE 1.24 UNITS/HR: 9 INJECTION, SOLUTION INTRAVENOUS at 01:54

## 2020-09-08 RX ADMIN — INSULIN LISPRO 6 UNITS: 100 INJECTION, SOLUTION INTRAVENOUS; SUBCUTANEOUS at 17:42

## 2020-09-08 RX ADMIN — SODIUM CHLORIDE 1.89 UNITS/HR: 9 INJECTION, SOLUTION INTRAVENOUS at 00:50

## 2020-09-08 RX ADMIN — ENOXAPARIN SODIUM 40 MG: 40 INJECTION SUBCUTANEOUS at 08:15

## 2020-09-08 RX ADMIN — INSULIN LISPRO 4 UNITS: 100 INJECTION, SOLUTION INTRAVENOUS; SUBCUTANEOUS at 17:43

## 2020-09-08 RX ADMIN — POTASSIUM CHLORIDE 10 MEQ: 7.46 INJECTION, SOLUTION INTRAVENOUS at 00:49

## 2020-09-08 RX ADMIN — INSULIN LISPRO 3 UNITS: 100 INJECTION, SOLUTION INTRAVENOUS; SUBCUTANEOUS at 13:08

## 2020-09-08 ASSESSMENT — PAIN SCALES - GENERAL
PAINLEVEL_OUTOF10: 0

## 2020-09-08 NOTE — CARE COORDINATION
Case Management Initial Discharge Plan  Leslye Buckner,             Met with:patient to discuss discharge plans. Information verified: address, contacts, phone number, , insurance Yes    Emergency Contact/Next of Kin name & number: Alyssa Nino [phone]    PCP: Anca Richards MD  Date of last visit: 5-6 mo ago    Insurance Provider: [address]    Discharge Planning    Living Arrangements:  Alone   Support Systems:  Friends/Neighbors, Family Members    Home has 2 stories  3 stairs to climb to get into front door, 13 stairs to climb to reach second floor  Location of bedroom/bathroom in home 2nd    Patient able to perform ADL's:Independent    Current Services (outpatient & in home) none  DME equipment: n/a  DME provider:    Receiving oral anticoagulation therapy? No    If indicated:   Physician managing anticoagulation treatment: n/a  Where does patient obtain lab work for ATC treatment? n/a      Potential Assistance Needed:  N/A    Patient agreeable to home care: No  [place] of choice provided:  n/a    Prior SNF/Rehab Placement and Facility: none  Agreeable to SNF/Rehab: No  [place] of choice provided: n/a     Evaluation: no    Expected Discharge date:  20    Patient expects to be discharged to:  home  Follow Up Appointment: Best Day/ Time: Monday PM    Transportation provider: friend or cab  Transportation arrangements needed for discharge: TBD    Readmission Risk              Risk of Unplanned Readmission:        11             Does patient have a readmission risk score greater than 14?: No  If yes, follow-up appointment must be made within 7 days of discharge.      Goals of Care Blood sugar WNL      Discharge Plan: home independently    [address] needs    Discharge [address] [place] [facility]  Clinical Case Management Department  Written by: Shannon Isaacs RN    Patient Name: Leslye Buckner  Attending Provider: Cristina Block MD  Admit Date: 2020  9:48 AM  MRN: [id number]  Account: [de-identified]                     : 1990  Discharge Date:       Disposition: home-no needs    Adelia Miller RN          Electronically signed by Adelia Miller RN on 20 at 4:25 PM EDT

## 2020-09-08 NOTE — PLAN OF CARE
Problem: Nausea/Vomiting:  Goal: Absence of nausea/vomiting  Description: Absence of nausea/vomiting, so far this shift pt. States he is tolerating an oral diet. 9/8/2020 1115 by Jose Fiore RN  Outcome: Met This Shift  9/8/2020 0307 by Kendell Romberg, RN  Outcome: Ongoing     Problem: Serum Glucose Level - Abnormal:  Goal: Ability to maintain appropriate glucose levels has stabilized  Description: Ability to maintain appropriate glucose levels has stabilized, insulin drip is off, monitoring sugars and controlling them with sliding scale. Will continue to monitor.    9/8/2020 1115 by Jose Fiore RN  Outcome: Ongoing  9/8/2020 0307 by Kendell Romberg, RN  Outcome: Ongoing

## 2020-09-08 NOTE — PROGRESS NOTES
Discharge instructions given all questions answered and call back number provided.  Electronically signed by Keila Yañez RN on 9/8/2020 at 5:32 PM

## 2020-09-08 NOTE — PLAN OF CARE
Problem: Falls - Risk of:  Goal: Will remain free from falls  Description: Will remain free from falls  9/8/2020 0307 by Vickey Calvo RN  Outcome: Ongoing  Pt remains free from falls at this time. Floor free from obstacles, and bed is locked and in lowest position. Adequate lighting provided. Call light within reach; pt encouraged to call before getting OOB for any need. Will continue to monitor needs during hourly rounding. Problem: Nausea/Vomiting:  Goal: Absence of nausea/vomiting  Description: Absence of nausea/vomiting  Outcome: Ongoing      No complaints of nausea/vomiting this shift. Problem: Serum Glucose Level - Abnormal:  Goal: Ability to maintain appropriate glucose levels has stabilized  Description: Ability to maintain appropriate glucose levels has stabilized  Outcome: Ongoing   Pt. Still on insulin gtt, electrolytes replaced prn, BS remains stable.     Electronically signed by Vickey Calvo RN on 9/8/2020 at 3:08 AM

## 2020-09-08 NOTE — PROGRESS NOTES
[facility]  Internal Medicine Teaching Residency Program  Inpatient Daily Progress Note  ______________________________________________________________________________    Patient: Sierra Scott  YOB: 1990   CWR:[id number]    Acct: [de-identified]     Room: [address] [place] [address]  Admit date: 9/7/2020  Today's date: 09/08/20  Number of days in the hospital: 1    SUBJECTIVE   Admitting Diagnosis: Diabetic ketoacidosis  CC: Vomiting and generalized body aches  Pt examined at bedside. Chart & results reviewed. No acute overnight events. Insulin drip was bridged with subcu insulin at night and later drip was stopped. Patient is able to eat and drink well. Denies any active complaint. There is some epigastric tenderness on examination. Is willing to go home today    ROS:  Constitutional:  negative for chills, fevers, sweats  Respiratory:  negative for cough, dyspnea on exertion, hemoptysis, shortness of breath, wheezing  Cardiovascular:  negative for chest pain, chest pressure/discomfort, lower extremity edema, palpitations  Gastrointestinal: Some epigastric abdominal pain. denies constipation, diarrhea, nausea, vomiting  Neurological:  negative for dizziness, headache  BRIEF HISTORY     The patient is a pleasant 31 y.o. male presents with past history of diabetes mellitus presented with a chief complaint of nausea with multiple episodes of vomiting after heavy drinking and missing his insulin doses.     In the ED, he was afebrile and hemodynamically stable with blood pressure of 115/111, heart rate 101, RR 18/min. His lab values showed bicarb 10, beta hydroxybutyrate 7.18, potassium 4.6, and peak glucose of 389, WBC 20, urinalysis unremarkable for UTI, VBG's 7.34/22/12 and chest x-ray was unremarkable. He received 2 L of IV fluids in the ED.      He was diagnosed with type 1 diabetes mellitus 2 years ago and since then is on insulin aspart sliding scale 3 times before meals, and insulin detemir 22 units daily. Patient reports that 2 days ago he drank heavily at a party and next morning he was having headache with nausea and multiple episodes of nonbloody vomiting with retching. He felt that he is having hangover effects from drinking so he continued to sleep but his condition continued to worsen with generalized body aches. He missed his insulin doses during this illness. In the morning he checked his blood sugar level which was very high and he took 15 units of his insulin without any relief of his symptoms and then then he decided to come to hospital.     On my evaluation, patient was looking very uncomfortable with severe generalized body aches but became comfortable after some time with a dose of morphine and Toradol. He denied shortness of breath, cough, chest pain, diarrhea, dysuria or burning micturition. His examination was fairly unremarkable. OBJECTIVE     Vital Signs:  /60   Pulse 68   Temp 98.5 °F (36.9 °C) (Oral)   Resp 14   Ht 5' 9\" (1.753 m)   Wt 178 lb 5.6 oz (80.9 kg)   SpO2 98%   BMI 26.34 kg/m²     Temp (24hrs), Av.2 °F (36.8 °C), Min:97.8 °F (36.6 °C), Max:98.5 °F (36.9 °C)    In: -   Out: 500 [Urine:500]    Physical Exam:  Constitutional: This is a well developed, well nourished, 25-29.9 - Overweight 31y.o. year old male who is alert, oriented, cooperative and in no apparent distress. Head:normocephalic and atraumatic. Respiratory: Chest was symmetrical without dullness to percussion. Breath sounds bilaterally were clear to auscultation. There were no wheezes, rhonchi or rales. There is no intercostal retraction or use of accessory muscles. No egophony noted. Cardiovascular: Regular without murmur, clicks, gallops or rubs. Abdomen: Slightly rounded and soft without organomegaly. No rebound, rigidity or guarding was appreciated. Lymphatic: No lymphadenopathy. Musculoskeletal: Normal curvature of the spine.   No gross muscle weakness. Extremities:  No lower extremity edema, ulcerations, tenderness, varicosities or erythema. Muscle size, tone and strength are normal.  No involuntary movements are noted. Skin:  Warm and dry. Good color, turgor and pigmentation. No lesions or scars.   No cyanosis or clubbing  Neurological/Psychiatric: The patient's general behavior, level of consciousness, thought content and emotional status is normal.        Medications:  Scheduled Medications:    enoxaparin  40 mg Subcutaneous Daily     Continuous Infusions:    dextrose 5 % and 0.45 % NaCl 150 mL/hr at 09/07/20 1537    insulin 1.89 Units/hr (09/08/20 0050)    0.9% NaCl with KCl 20 mEq Stopped (09/07/20 1529)    dextrose 5 % and 0.45 % NaCl       PRN Medicationsipratropium-albuterol, 1 ampule, Q4H PRN  dextrose, 12.5 g, PRN  potassium chloride, 10 mEq, PRN  magnesium sulfate, 1 g, PRN  sodium phosphate IVPB, 10 mmol, PRN    Or  sodium phosphate IVPB, 15 mmol, PRN    Or  sodium phosphate IVPB, 20 mmol, PRN  dextrose 5 % and 0.45 % NaCl, , Continuous PRN        Diagnostic Labs:  CBC:   Recent Labs     09/07/20  1013   WBC 20.2*   RBC 5.19   HGB 15.9   HCT 47.3   MCV 91.1   RDW 13.1        BMP:   Recent Labs     09/07/20  1013 09/07/20  2020 09/08/20  0040   * 135 137   K 4.6 3.5* 3.8   CL 96* 107 109*   CO2 10* 19* 19*   PHOS  --  1.9* 2.2*   BUN 19 18 16   CREATININE 0.98 0.95 0.86     ASSESSMENT & PLAN     IMPRESSION  This is a 31 y.o. male with PMH of type 1 diabetes mellitus on insulin at home presented with nausea and multiple episodes of vomiting after heavy alcohol drinking and missing his insulin dose for 2-3 days.     Diabetic ketoacidosis  -Anion gap metabolic acidosis with elevated beta hydroxybutyrate  -Insulin drip bridge to subcu insulin when patient was able to eat, anion gap <15 twice consecutively, bicarb >18 twice consecutively and blood glucose <250.  -Gentle fluid hydration with Ringer lactate for now  -Check POC blood glucose every hour  -BMP every 4 hours  -Likely discharge today on scheduled basal and bolus insulin with strict compliance and outpatient follow-up     Type I diabetes mellitus on home NovoLog sliding scale and Levemir 22 units daily. Last HbA1c 8.3 as per 7/2018. Follow HbA1c. Diabetes education        DVT ppx: Lovenox  GI ppx: Not needed     PT/OT/SW: Ongoing  Discharge Planning: Ongoing    Chele Hunt MD  Internal Medicine Resident, PGY-1  [facility]; [place], [place]  9/8/2020, 1:22 AM       Attending Physician Statement  I have discussed the case, including pertinent history and exam findings with the resident and the team.  I have seen and examined the patient and the key elements of the encounter have been performed by me. I agree with the assessment, plan and orders as documented by the resident.       In Brief:  Doing well  Tolerating oral intake  Acidosis resolved  Continue home insulin regimen   Ok to DC home today      Mbonu Glady Ganser, MD   Attending Physician, Internal Medicine Residency Program  9/8/2020, 1:39 PM

## 2020-09-08 NOTE — PROGRESS NOTES
This is a 31 y.o. male admitted 9/7/2020 for Type 1 diabetes mellitus with ketoacidosis, uncontrolled ([facility] [address] [E10.10]. See H&P of admitting/intern resident for more details. Patient presented to the ED with a chief complaint of nausea and emesis. He reported that he was at a party 3 days ago, drank a lot of alcohol, and has been having nausea and emesis since then. Patient has history of type 1 diabetes mellitus, has not been taking insulin since the past 3 days as he was sick. Took 15 units of Humalog this a.m. before coming to the ED. Reports having generalized body aches. In the ED the labs showed a picture of DKA, beta hydroxybutyrate 7.19, blood glucose 349, anion gap 26, bicarb 10, pH 7.349, UA negative for infection. Mildly elevated LFTs alk phos 141, , AST 92, bilirubin 1.39, lipase 9 . Had one episode of DKA in 7/2018. Patient started on DKA protocol. BMP:   Recent Labs     09/07/20  1013  09/07/20  1528 09/07/20 2020 09/08/20  0040   *  --   --  135 137   K 4.6  --   --  3.5* 3.8   CL 96*  --   --  107 109*   CO2 10*  --   --  19* 19*   BUN 19  --   --  18 16   CREATININE 0.98  --   --  0.95 0.86   GLUCOSE 349*   < > 243 218* 127*    < > = values in this interval not displayed. CBC: )  Recent Labs     09/07/20  1013   WBC 20.2*   HGB 15.9   HCT 47.3             Assessment    Active Problems:    Type 1 diabetes mellitus with ketoacidosis, uncontrolled ([facility] [address]  Resolved Problems:    * No resolved hospital problems. *        Plan     -DKA- likely from noncompliance. start on DKA protocol  Monitor BMP every 4 hourly. Replace electrolytes as needed. Switch IV fluids to D5 half-normal saline when the blood glucose is 250mg  Trop <6     Leukocytosis-likely reactive  Monitor  UA negative,    Mild Transaminitis-likely reactive, follow-up LFTs tomorrow a.m.     Thiamine, folic acid, multivitamin  -      Amarilis Lees MD            Department of Internal

## 2020-09-08 NOTE — PROGRESS NOTES
CLINICAL PHARMACY NOTE: MEDS TO [address] Select Patient?: No  Total # of Prescriptions Filled: 2   The following medications were delivered to the patient:  · TRESIBA   · PEN NEEDLES  Total # of Interventions Completed: 0  Time Spent (min): 0    Additional Documentation:    PT PICKING UP LISINOPRIL AND INSULIN TOMORROW

## 2020-09-09 LAB
EKG ATRIAL RATE: 97 BPM
EKG P AXIS: 81 DEGREES
EKG P-R INTERVAL: 134 MS
EKG Q-T INTERVAL: 372 MS
EKG QRS DURATION: 96 MS
EKG QTC CALCULATION (BAZETT): 472 MS
EKG R AXIS: 58 DEGREES
EKG T AXIS: 60 DEGREES
EKG VENTRICULAR RATE: 97 BPM

## 2020-09-09 PROCEDURE — 93010 ELECTROCARDIOGRAM REPORT: CPT | Performed by: INTERNAL MEDICINE

## 2020-09-10 NOTE — PROGRESS NOTES
CLINICAL PHARMACY NOTE: MEDS TO [address] Select Patient?: No  Total # of Prescriptions Filled: 2   The following medications were delivered to the patient:  · See notes  Total # of Interventions Completed: 1  Time Spent (min): 60    Additional Documentation:Patient was discharged without meds delivered , so I called him and he will pick them up at the pharmacy later.

## 2020-09-13 LAB
CULTURE: NORMAL
CULTURE: NORMAL
Lab: NORMAL
Lab: NORMAL
SPECIMEN DESCRIPTION: NORMAL
SPECIMEN DESCRIPTION: NORMAL

## 2020-09-17 NOTE — DISCHARGE SUMMARY
Disp-100 each,R-3, Normal         CONTINUE these medications which have CHANGED    Details   insulin detemir (LEVEMIR FLEXTOUCH) 100 UNIT/ML injection pen Inject 25 Units into the skin daily (with breakfast), Disp-22.5 mL,R-3Normal      lisinopril (PRINIVIL;ZESTRIL) 2.5 MG tablet Take 1 tablet by mouth daily, Disp-30 tablet,R-3Normal         CONTINUE these medications which have NOT CHANGED    Details   ibuprofen (ADVIL;MOTRIN) 800 MG tablet Take 1 tablet by mouth every 8 hours as needed for Pain, Disp-20 tablet, R-0Print      aspirin 81 MG chewable tablet Take 1 tablet by mouth daily, Disp-30 tablet, R-3Normal      atorvastatin (LIPITOR) 20 MG tablet Take 1 tablet by mouth nightly, Disp-30 tablet, R-3Normal      insulin aspart (NOVOLOG FLEXPEN) 100 UNIT/ML injection pen Inject 3 Units into the skin 3 times daily (before meals) Use as per the scale provided, Disp-1 pen, R-1Normal      fluticasone-salmeterol (ADVAIR DISKUS) 250-50 MCG/DOSE AEPB Inhale 1 puff into the lungs every 12 hours. , Disp-60 each, R-3      albuterol (PROVENTIL HFA) 108 (90 BASE) MCG/ACT inhaler Inhale 2 puffs into the lungs every 4 hours as needed for Wheezing., Disp-1 Inhaler, R-3             Activity: activity as tolerated    Diet: diabetic diet    Follow-up:    Caridad Stevenson MD  [address] [place] [address]  [phone]    In 1 week  Please follow up for insulin dose adjustment    [place] [address]  [phone]    Schedule an appointment as soon as possible for a visit  Patient diagnosed with DKA, 2 nd episode of DKA      Patient Instructions:   F/u with PCP and endocrinology  Compliance with the insulin treatment  Follow up labs: None  Follow up imaging: None    Note that over 30 minutes was spent in preparing discharge papers, discussing discharge with patient, medication review, etc.      Thanh Luna MD, MD  Internal Medicine Resident, PGY-1  [facility] [address] [place] [address] Ne;  [place], [place]  9/16/2020, 9:17 PM

## 2020-11-02 ENCOUNTER — HOSPITAL ENCOUNTER (INPATIENT)
Age: 30
LOS: 2 days | Discharge: HOME | DRG: 639 | End: 2020-11-04
Attending: EMERGENCY MEDICINE | Admitting: INTERNAL MEDICINE
Payer: COMMERCIAL

## 2020-11-02 PROBLEM — E10.10 DKA, TYPE 1, NOT AT GOAL (HCC): Status: ACTIVE | Noted: 2020-11-02

## 2020-11-02 LAB
ABSOLUTE EOS #: 0.06 K/UL (ref 0–0.44)
ABSOLUTE IMMATURE GRANULOCYTE: 0.04 K/UL (ref 0–0.3)
ABSOLUTE LYMPH #: 1.01 K/UL (ref 1.1–3.7)
ABSOLUTE MONO #: 0.42 K/UL (ref 0.1–1.2)
ALBUMIN SERPL-MCNC: 4.2 G/DL (ref 3.5–5.2)
ALBUMIN/GLOBULIN RATIO: 2 (ref 1–2.5)
ALLEN TEST: ABNORMAL
ALP BLD-CCNC: 187 U/L (ref 40–129)
ALT SERPL-CCNC: 95 U/L (ref 5–41)
ANION GAP SERPL CALCULATED.3IONS-SCNC: 23 MMOL/L (ref 9–17)
ANION GAP SERPL CALCULATED.3IONS-SCNC: 23 MMOL/L (ref 9–17)
ANION GAP: 11 MMOL/L (ref 7–16)
AST SERPL-CCNC: 62 U/L
BASOPHILS # BLD: 0 % (ref 0–2)
BASOPHILS ABSOLUTE: 0.04 K/UL (ref 0–0.2)
BETA-HYDROXYBUTYRATE: 5.35 MMOL/L (ref 0.02–0.27)
BILIRUB SERPL-MCNC: 1.68 MG/DL (ref 0.3–1.2)
BILIRUBIN URINE: NEGATIVE
BUN BLDV-MCNC: 17 MG/DL (ref 6–20)
BUN BLDV-MCNC: 19 MG/DL (ref 6–20)
BUN/CREAT BLD: ABNORMAL (ref 9–20)
BUN/CREAT BLD: ABNORMAL (ref 9–20)
CALCIUM SERPL-MCNC: 7.6 MG/DL (ref 8.6–10.4)
CALCIUM SERPL-MCNC: 8.7 MG/DL (ref 8.6–10.4)
CHLORIDE BLD-SCNC: 91 MMOL/L (ref 98–107)
CHLORIDE BLD-SCNC: 98 MMOL/L (ref 98–107)
CHP ED QC CHECK: NORMAL
CO2: 13 MMOL/L (ref 20–31)
CO2: 16 MMOL/L (ref 20–31)
COLOR: YELLOW
COMMENT UA: ABNORMAL
CREAT SERPL-MCNC: 0.82 MG/DL (ref 0.7–1.2)
CREAT SERPL-MCNC: 0.83 MG/DL (ref 0.7–1.2)
DIFFERENTIAL TYPE: ABNORMAL
EOSINOPHILS RELATIVE PERCENT: 1 % (ref 1–4)
FIO2: ABNORMAL
GFR AFRICAN AMERICAN: >60 ML/MIN
GFR AFRICAN AMERICAN: >60 ML/MIN
GFR NON-AFRICAN AMERICAN: >60 ML/MIN
GFR SERPL CREATININE-BSD FRML MDRD: >60 ML/MIN
GFR SERPL CREATININE-BSD FRML MDRD: ABNORMAL ML/MIN/{1.73_M2}
GFR SERPL CREATININE-BSD FRML MDRD: NORMAL ML/MIN/{1.73_M2}
GLUCOSE BLD-MCNC: 413 MG/DL (ref 70–99)
GLUCOSE BLD-MCNC: 428 MG/DL (ref 75–110)
GLUCOSE BLD-MCNC: 482 MG/DL
GLUCOSE BLD-MCNC: 667 MG/DL (ref 70–99)
GLUCOSE BLD-MCNC: 697 MG/DL (ref 74–100)
GLUCOSE URINE: ABNORMAL
HCO3 VENOUS: 18.3 MMOL/L (ref 22–29)
HCT VFR BLD CALC: 43.3 % (ref 40.7–50.3)
HEMOGLOBIN: 14.2 G/DL (ref 13–17)
IMMATURE GRANULOCYTES: 0 %
KETONES, URINE: ABNORMAL
LEUKOCYTE ESTERASE, URINE: NEGATIVE
LYMPHOCYTES # BLD: 11 % (ref 24–43)
MCH RBC QN AUTO: 30.7 PG (ref 25.2–33.5)
MCHC RBC AUTO-ENTMCNC: 32.8 G/DL (ref 28.4–34.8)
MCV RBC AUTO: 93.7 FL (ref 82.6–102.9)
MODE: ABNORMAL
MONOCYTES # BLD: 5 % (ref 3–12)
NEGATIVE BASE EXCESS, VEN: 6 (ref 0–2)
NITRITE, URINE: NEGATIVE
NRBC AUTOMATED: 0 PER 100 WBC
O2 DEVICE/FLOW/%: ABNORMAL
O2 SAT, VEN: 98 % (ref 60–85)
PATIENT TEMP: ABNORMAL
PCO2, VEN: 32.9 MM HG (ref 41–51)
PDW BLD-RTO: 12.9 % (ref 11.8–14.4)
PH UA: 5 (ref 5–8)
PH VENOUS: 7.35 (ref 7.32–7.43)
PLATELET # BLD: 212 K/UL (ref 138–453)
PLATELET ESTIMATE: ABNORMAL
PMV BLD AUTO: 11.4 FL (ref 8.1–13.5)
PO2, VEN: 108.3 MM HG (ref 30–50)
POC CHLORIDE: 98 MMOL/L (ref 98–107)
POC CREATININE: 0.81 MG/DL (ref 0.51–1.19)
POC HEMATOCRIT: 48 % (ref 41–53)
POC HEMOGLOBIN: 16.3 G/DL (ref 13.5–17.5)
POC IONIZED CALCIUM: 1.02 MMOL/L (ref 1.15–1.33)
POC LACTIC ACID: 2.97 MMOL/L (ref 0.56–1.39)
POC PCO2 TEMP: ABNORMAL MM HG
POC PH TEMP: ABNORMAL
POC PO2 TEMP: ABNORMAL MM HG
POC POTASSIUM: 4.6 MMOL/L (ref 3.5–4.5)
POC SODIUM: 127 MMOL/L (ref 138–146)
POSITIVE BASE EXCESS, VEN: ABNORMAL (ref 0–3)
POTASSIUM SERPL-SCNC: 4.1 MMOL/L (ref 3.7–5.3)
POTASSIUM SERPL-SCNC: 4.9 MMOL/L (ref 3.7–5.3)
PROTEIN UA: NEGATIVE
RBC # BLD: 4.62 M/UL (ref 4.21–5.77)
RBC # BLD: ABNORMAL 10*6/UL
SAMPLE SITE: ABNORMAL
SEG NEUTROPHILS: 83 % (ref 36–65)
SEGMENTED NEUTROPHILS ABSOLUTE COUNT: 7.55 K/UL (ref 1.5–8.1)
SODIUM BLD-SCNC: 130 MMOL/L (ref 135–144)
SODIUM BLD-SCNC: 134 MMOL/L (ref 135–144)
SPECIFIC GRAVITY UA: 1.03 (ref 1–1.03)
TOTAL CO2, VENOUS: 19 MMOL/L (ref 23–30)
TOTAL PROTEIN: 6.3 G/DL (ref 6.4–8.3)
TURBIDITY: CLEAR
URINE HGB: NEGATIVE
UROBILINOGEN, URINE: NORMAL
WBC # BLD: 9.1 K/UL (ref 3.5–11.3)
WBC # BLD: ABNORMAL 10*3/UL

## 2020-11-02 PROCEDURE — 82435 ASSAY OF BLOOD CHLORIDE: CPT

## 2020-11-02 PROCEDURE — 82803 BLOOD GASES ANY COMBINATION: CPT

## 2020-11-02 PROCEDURE — 99222 1ST HOSP IP/OBS MODERATE 55: CPT | Performed by: NURSE PRACTITIONER

## 2020-11-02 PROCEDURE — 80048 BASIC METABOLIC PNL TOTAL CA: CPT

## 2020-11-02 PROCEDURE — 85014 HEMATOCRIT: CPT

## 2020-11-02 PROCEDURE — 99285 EMERGENCY DEPT VISIT HI MDM: CPT

## 2020-11-02 PROCEDURE — 2580000003 HC RX 258: Performed by: STUDENT IN AN ORGANIZED HEALTH CARE EDUCATION/TRAINING PROGRAM

## 2020-11-02 PROCEDURE — 85025 COMPLETE CBC W/AUTO DIFF WBC: CPT

## 2020-11-02 PROCEDURE — 82010 KETONE BODYS QUAN: CPT

## 2020-11-02 PROCEDURE — 81003 URINALYSIS AUTO W/O SCOPE: CPT

## 2020-11-02 PROCEDURE — 96374 THER/PROPH/DIAG INJ IV PUSH: CPT

## 2020-11-02 PROCEDURE — 82947 ASSAY GLUCOSE BLOOD QUANT: CPT

## 2020-11-02 PROCEDURE — 84295 ASSAY OF SERUM SODIUM: CPT

## 2020-11-02 PROCEDURE — 6370000000 HC RX 637 (ALT 250 FOR IP): Performed by: STUDENT IN AN ORGANIZED HEALTH CARE EDUCATION/TRAINING PROGRAM

## 2020-11-02 PROCEDURE — 96375 TX/PRO/DX INJ NEW DRUG ADDON: CPT

## 2020-11-02 PROCEDURE — 2060000000 HC ICU INTERMEDIATE R&B

## 2020-11-02 PROCEDURE — 82565 ASSAY OF CREATININE: CPT

## 2020-11-02 PROCEDURE — 82330 ASSAY OF CALCIUM: CPT

## 2020-11-02 PROCEDURE — 80053 COMPREHEN METABOLIC PANEL: CPT

## 2020-11-02 PROCEDURE — 84132 ASSAY OF SERUM POTASSIUM: CPT

## 2020-11-02 PROCEDURE — 83605 ASSAY OF LACTIC ACID: CPT

## 2020-11-02 PROCEDURE — 6360000002 HC RX W HCPCS: Performed by: STUDENT IN AN ORGANIZED HEALTH CARE EDUCATION/TRAINING PROGRAM

## 2020-11-02 RX ORDER — 0.9 % SODIUM CHLORIDE 0.9 %
1000 INTRAVENOUS SOLUTION INTRAVENOUS ONCE
Status: COMPLETED | OUTPATIENT
Start: 2020-11-02 | End: 2020-11-02

## 2020-11-02 RX ORDER — ONDANSETRON 2 MG/ML
4 INJECTION INTRAMUSCULAR; INTRAVENOUS ONCE
Status: COMPLETED | OUTPATIENT
Start: 2020-11-02 | End: 2020-11-02

## 2020-11-02 RX ORDER — DEXTROSE MONOHYDRATE 50 MG/ML
100 INJECTION, SOLUTION INTRAVENOUS PRN
Status: DISCONTINUED | OUTPATIENT
Start: 2020-11-02 | End: 2020-11-04 | Stop reason: HOSPADM

## 2020-11-02 RX ORDER — NICOTINE POLACRILEX 4 MG
15 LOZENGE BUCCAL PRN
Status: DISCONTINUED | OUTPATIENT
Start: 2020-11-02 | End: 2020-11-04 | Stop reason: HOSPADM

## 2020-11-02 RX ORDER — PROMETHAZINE HYDROCHLORIDE 25 MG/ML
12.5 INJECTION, SOLUTION INTRAMUSCULAR; INTRAVENOUS ONCE
Status: COMPLETED | OUTPATIENT
Start: 2020-11-02 | End: 2020-11-02

## 2020-11-02 RX ORDER — DEXTROSE MONOHYDRATE 25 G/50ML
12.5 INJECTION, SOLUTION INTRAVENOUS PRN
Status: DISCONTINUED | OUTPATIENT
Start: 2020-11-02 | End: 2020-11-04 | Stop reason: HOSPADM

## 2020-11-02 RX ORDER — MORPHINE SULFATE 4 MG/ML
4 INJECTION, SOLUTION INTRAMUSCULAR; INTRAVENOUS ONCE
Status: COMPLETED | OUTPATIENT
Start: 2020-11-02 | End: 2020-11-02

## 2020-11-02 RX ADMIN — SODIUM CHLORIDE 0.1 UNITS/KG/HR: 9 INJECTION, SOLUTION INTRAVENOUS at 21:35

## 2020-11-02 RX ADMIN — SODIUM CHLORIDE 1000 ML: 9 INJECTION, SOLUTION INTRAVENOUS at 20:45

## 2020-11-02 RX ADMIN — PROMETHAZINE HYDROCHLORIDE 12.5 MG: 25 INJECTION INTRAMUSCULAR; INTRAVENOUS at 21:48

## 2020-11-02 RX ADMIN — MORPHINE SULFATE 4 MG: 4 INJECTION INTRAVENOUS at 21:52

## 2020-11-02 RX ADMIN — SODIUM CHLORIDE 1000 ML: 9 INJECTION, SOLUTION INTRAVENOUS at 21:47

## 2020-11-02 RX ADMIN — ONDANSETRON 4 MG: 2 INJECTION INTRAMUSCULAR; INTRAVENOUS at 20:46

## 2020-11-02 ASSESSMENT — ENCOUNTER SYMPTOMS
COUGH: 0
DIARRHEA: 0
VOMITING: 1
RESPIRATORY NEGATIVE: 1
GASTROINTESTINAL NEGATIVE: 1
SHORTNESS OF BREATH: 0
BACK PAIN: 0
EYES NEGATIVE: 1
ALLERGIC/IMMUNOLOGIC NEGATIVE: 1
NAUSEA: 1
ABDOMINAL PAIN: 1

## 2020-11-02 ASSESSMENT — PAIN SCALES - GENERAL
PAINLEVEL_OUTOF10: 5
PAINLEVEL_OUTOF10: 10

## 2020-11-02 ASSESSMENT — PAIN DESCRIPTION - LOCATION: LOCATION: NECK

## 2020-11-02 ASSESSMENT — PAIN DESCRIPTION - FREQUENCY: FREQUENCY: CONTINUOUS

## 2020-11-03 PROBLEM — Z72.0 TOBACCO USE: Status: ACTIVE | Noted: 2020-11-03

## 2020-11-03 LAB
ALBUMIN SERPL-MCNC: 3.3 G/DL (ref 3.5–5.2)
ANION GAP SERPL CALCULATED.3IONS-SCNC: 10 MMOL/L (ref 9–17)
ANION GAP SERPL CALCULATED.3IONS-SCNC: 14 MMOL/L (ref 9–17)
ANION GAP SERPL CALCULATED.3IONS-SCNC: 15 MMOL/L (ref 9–17)
BUN BLDV-MCNC: 10 MG/DL (ref 6–20)
BUN BLDV-MCNC: 11 MG/DL (ref 6–20)
BUN BLDV-MCNC: 11 MG/DL (ref 6–20)
BUN/CREAT BLD: ABNORMAL (ref 9–20)
CALCIUM SERPL-MCNC: 7.8 MG/DL (ref 8.6–10.4)
CALCIUM SERPL-MCNC: 8 MG/DL (ref 8.6–10.4)
CALCIUM SERPL-MCNC: 8.2 MG/DL (ref 8.6–10.4)
CHLORIDE BLD-SCNC: 101 MMOL/L (ref 98–107)
CHLORIDE BLD-SCNC: 103 MMOL/L (ref 98–107)
CHLORIDE BLD-SCNC: 103 MMOL/L (ref 98–107)
CO2: 19 MMOL/L (ref 20–31)
CO2: 21 MMOL/L (ref 20–31)
CO2: 22 MMOL/L (ref 20–31)
CREAT SERPL-MCNC: 0.52 MG/DL (ref 0.7–1.2)
CREAT SERPL-MCNC: 0.62 MG/DL (ref 0.7–1.2)
CREAT SERPL-MCNC: 0.78 MG/DL (ref 0.7–1.2)
ESTIMATED AVERAGE GLUCOSE: 223 MG/DL
GFR AFRICAN AMERICAN: >60 ML/MIN
GFR NON-AFRICAN AMERICAN: >60 ML/MIN
GFR SERPL CREATININE-BSD FRML MDRD: ABNORMAL ML/MIN/{1.73_M2}
GLUCOSE BLD-MCNC: 133 MG/DL (ref 75–110)
GLUCOSE BLD-MCNC: 137 MG/DL (ref 75–110)
GLUCOSE BLD-MCNC: 147 MG/DL (ref 75–110)
GLUCOSE BLD-MCNC: 150 MG/DL (ref 75–110)
GLUCOSE BLD-MCNC: 155 MG/DL (ref 70–99)
GLUCOSE BLD-MCNC: 158 MG/DL (ref 75–110)
GLUCOSE BLD-MCNC: 162 MG/DL (ref 75–110)
GLUCOSE BLD-MCNC: 163 MG/DL (ref 75–110)
GLUCOSE BLD-MCNC: 169 MG/DL (ref 75–110)
GLUCOSE BLD-MCNC: 170 MG/DL (ref 75–110)
GLUCOSE BLD-MCNC: 190 MG/DL (ref 75–110)
GLUCOSE BLD-MCNC: 201 MG/DL (ref 75–110)
GLUCOSE BLD-MCNC: 224 MG/DL (ref 75–110)
GLUCOSE BLD-MCNC: 249 MG/DL (ref 75–110)
GLUCOSE BLD-MCNC: 254 MG/DL (ref 70–99)
GLUCOSE BLD-MCNC: 301 MG/DL (ref 75–110)
GLUCOSE BLD-MCNC: 307 MG/DL (ref 75–110)
GLUCOSE BLD-MCNC: 312 MG/DL (ref 70–99)
GLUCOSE BLD-MCNC: >600 MG/DL (ref 75–110)
HBA1C MFR BLD: 9.4 % (ref 4–6)
HCT VFR BLD CALC: 39.8 % (ref 40.7–50.3)
HCT VFR BLD CALC: 41.7 % (ref 40.7–50.3)
HEMOGLOBIN: 13.1 G/DL (ref 13–17)
HEMOGLOBIN: 13.6 G/DL (ref 13–17)
MAGNESIUM: 1.9 MG/DL (ref 1.6–2.6)
MCH RBC QN AUTO: 30.5 PG (ref 25.2–33.5)
MCH RBC QN AUTO: 30.6 PG (ref 25.2–33.5)
MCHC RBC AUTO-ENTMCNC: 32.6 G/DL (ref 28.4–34.8)
MCHC RBC AUTO-ENTMCNC: 32.9 G/DL (ref 28.4–34.8)
MCV RBC AUTO: 92.8 FL (ref 82.6–102.9)
MCV RBC AUTO: 93.9 FL (ref 82.6–102.9)
NRBC AUTOMATED: 0 PER 100 WBC
NRBC AUTOMATED: 0 PER 100 WBC
PDW BLD-RTO: 12.8 % (ref 11.8–14.4)
PDW BLD-RTO: 13 % (ref 11.8–14.4)
PHOSPHORUS: 2.7 MG/DL (ref 2.5–4.5)
PHOSPHORUS: 2.8 MG/DL (ref 2.5–4.5)
PHOSPHORUS: 3.6 MG/DL (ref 2.5–4.5)
PLATELET # BLD: 174 K/UL (ref 138–453)
PLATELET # BLD: 192 K/UL (ref 138–453)
PMV BLD AUTO: 10.5 FL (ref 8.1–13.5)
PMV BLD AUTO: 10.6 FL (ref 8.1–13.5)
POTASSIUM SERPL-SCNC: 4 MMOL/L (ref 3.7–5.3)
POTASSIUM SERPL-SCNC: 4.3 MMOL/L (ref 3.7–5.3)
POTASSIUM SERPL-SCNC: 4.4 MMOL/L (ref 3.7–5.3)
RBC # BLD: 4.29 M/UL (ref 4.21–5.77)
RBC # BLD: 4.44 M/UL (ref 4.21–5.77)
SODIUM BLD-SCNC: 133 MMOL/L (ref 135–144)
SODIUM BLD-SCNC: 136 MMOL/L (ref 135–144)
SODIUM BLD-SCNC: 139 MMOL/L (ref 135–144)
WBC # BLD: 5.5 K/UL (ref 3.5–11.3)
WBC # BLD: 8.7 K/UL (ref 3.5–11.3)

## 2020-11-03 PROCEDURE — 2580000003 HC RX 258: Performed by: NURSE PRACTITIONER

## 2020-11-03 PROCEDURE — 6360000002 HC RX W HCPCS: Performed by: STUDENT IN AN ORGANIZED HEALTH CARE EDUCATION/TRAINING PROGRAM

## 2020-11-03 PROCEDURE — 85027 COMPLETE CBC AUTOMATED: CPT

## 2020-11-03 PROCEDURE — 6370000000 HC RX 637 (ALT 250 FOR IP): Performed by: INTERNAL MEDICINE

## 2020-11-03 PROCEDURE — 80048 BASIC METABOLIC PNL TOTAL CA: CPT

## 2020-11-03 PROCEDURE — 94640 AIRWAY INHALATION TREATMENT: CPT

## 2020-11-03 PROCEDURE — 83036 HEMOGLOBIN GLYCOSYLATED A1C: CPT

## 2020-11-03 PROCEDURE — 2580000003 HC RX 258: Performed by: STUDENT IN AN ORGANIZED HEALTH CARE EDUCATION/TRAINING PROGRAM

## 2020-11-03 PROCEDURE — 2060000000 HC ICU INTERMEDIATE R&B

## 2020-11-03 PROCEDURE — 2500000003 HC RX 250 WO HCPCS: Performed by: STUDENT IN AN ORGANIZED HEALTH CARE EDUCATION/TRAINING PROGRAM

## 2020-11-03 PROCEDURE — 36415 COLL VENOUS BLD VENIPUNCTURE: CPT

## 2020-11-03 PROCEDURE — 6360000002 HC RX W HCPCS: Performed by: NURSE PRACTITIONER

## 2020-11-03 PROCEDURE — 94664 DEMO&/EVAL PT USE INHALER: CPT

## 2020-11-03 PROCEDURE — 6370000000 HC RX 637 (ALT 250 FOR IP): Performed by: NURSE PRACTITIONER

## 2020-11-03 PROCEDURE — 2580000003 HC RX 258

## 2020-11-03 PROCEDURE — 99232 SBSQ HOSP IP/OBS MODERATE 35: CPT | Performed by: INTERNAL MEDICINE

## 2020-11-03 PROCEDURE — 84100 ASSAY OF PHOSPHORUS: CPT

## 2020-11-03 PROCEDURE — 83735 ASSAY OF MAGNESIUM: CPT

## 2020-11-03 PROCEDURE — 80069 RENAL FUNCTION PANEL: CPT

## 2020-11-03 RX ORDER — 0.9 % SODIUM CHLORIDE 0.9 %
15 INTRAVENOUS SOLUTION INTRAVENOUS ONCE
Status: DISCONTINUED | OUTPATIENT
Start: 2020-11-03 | End: 2020-11-03

## 2020-11-03 RX ORDER — POTASSIUM CHLORIDE 7.45 MG/ML
10 INJECTION INTRAVENOUS PRN
Status: DISCONTINUED | OUTPATIENT
Start: 2020-11-03 | End: 2020-11-04 | Stop reason: HOSPADM

## 2020-11-03 RX ORDER — ATORVASTATIN CALCIUM 20 MG/1
20 TABLET, FILM COATED ORAL NIGHTLY
Status: DISCONTINUED | OUTPATIENT
Start: 2020-11-03 | End: 2020-11-04 | Stop reason: HOSPADM

## 2020-11-03 RX ORDER — INSULIN GLARGINE 100 [IU]/ML
25 INJECTION, SOLUTION SUBCUTANEOUS NIGHTLY
Status: DISCONTINUED | OUTPATIENT
Start: 2020-11-03 | End: 2020-11-04 | Stop reason: HOSPADM

## 2020-11-03 RX ORDER — MAGNESIUM SULFATE 1 G/100ML
1 INJECTION INTRAVENOUS PRN
Status: DISCONTINUED | OUTPATIENT
Start: 2020-11-03 | End: 2020-11-03 | Stop reason: SDUPTHER

## 2020-11-03 RX ORDER — DEXTROSE AND SODIUM CHLORIDE 5; .45 G/100ML; G/100ML
INJECTION, SOLUTION INTRAVENOUS
Status: COMPLETED
Start: 2020-11-03 | End: 2020-11-03

## 2020-11-03 RX ORDER — BUDESONIDE AND FORMOTEROL FUMARATE DIHYDRATE 160; 4.5 UG/1; UG/1
2 AEROSOL RESPIRATORY (INHALATION) 2 TIMES DAILY
Status: DISCONTINUED | OUTPATIENT
Start: 2020-11-03 | End: 2020-11-04 | Stop reason: HOSPADM

## 2020-11-03 RX ORDER — ASPIRIN 81 MG/1
81 TABLET, CHEWABLE ORAL DAILY
Status: DISCONTINUED | OUTPATIENT
Start: 2020-11-03 | End: 2020-11-04 | Stop reason: HOSPADM

## 2020-11-03 RX ORDER — DEXTROSE AND SODIUM CHLORIDE 5; .45 G/100ML; G/100ML
INJECTION, SOLUTION INTRAVENOUS CONTINUOUS PRN
Status: DISCONTINUED | OUTPATIENT
Start: 2020-11-03 | End: 2020-11-03

## 2020-11-03 RX ORDER — LISINOPRIL 2.5 MG/1
2.5 TABLET ORAL DAILY
Status: DISCONTINUED | OUTPATIENT
Start: 2020-11-03 | End: 2020-11-04 | Stop reason: HOSPADM

## 2020-11-03 RX ORDER — SODIUM CHLORIDE 450 MG/100ML
INJECTION, SOLUTION INTRAVENOUS CONTINUOUS
Status: DISCONTINUED | OUTPATIENT
Start: 2020-11-03 | End: 2020-11-03

## 2020-11-03 RX ORDER — SODIUM CHLORIDE 9 MG/ML
INJECTION, SOLUTION INTRAVENOUS CONTINUOUS
Status: DISCONTINUED | OUTPATIENT
Start: 2020-11-03 | End: 2020-11-03

## 2020-11-03 RX ORDER — DEXTROSE MONOHYDRATE 25 G/50ML
12.5 INJECTION, SOLUTION INTRAVENOUS PRN
Status: DISCONTINUED | OUTPATIENT
Start: 2020-11-03 | End: 2020-11-03 | Stop reason: SDUPTHER

## 2020-11-03 RX ORDER — THIAMINE MONONITRATE (VIT B1) 100 MG
100 TABLET ORAL DAILY
Status: DISCONTINUED | OUTPATIENT
Start: 2020-11-03 | End: 2020-11-04 | Stop reason: HOSPADM

## 2020-11-03 RX ORDER — FOLIC ACID 1 MG/1
1 TABLET ORAL DAILY
Status: DISCONTINUED | OUTPATIENT
Start: 2020-11-03 | End: 2020-11-04 | Stop reason: HOSPADM

## 2020-11-03 RX ORDER — INSULIN GLARGINE 100 [IU]/ML
25 INJECTION, SOLUTION SUBCUTANEOUS NIGHTLY
Status: DISCONTINUED | OUTPATIENT
Start: 2020-11-03 | End: 2020-11-03

## 2020-11-03 RX ORDER — MAGNESIUM SULFATE 1 G/100ML
1 INJECTION INTRAVENOUS PRN
Status: DISCONTINUED | OUTPATIENT
Start: 2020-11-03 | End: 2020-11-04 | Stop reason: HOSPADM

## 2020-11-03 RX ORDER — PANTOPRAZOLE SODIUM 40 MG/1
40 TABLET, DELAYED RELEASE ORAL
Status: DISCONTINUED | OUTPATIENT
Start: 2020-11-03 | End: 2020-11-04 | Stop reason: HOSPADM

## 2020-11-03 RX ADMIN — INSULIN LISPRO 1 UNITS: 100 INJECTION, SOLUTION INTRAVENOUS; SUBCUTANEOUS at 20:42

## 2020-11-03 RX ADMIN — BUDESONIDE AND FORMOTEROL FUMARATE DIHYDRATE 2 PUFF: 160; 4.5 AEROSOL RESPIRATORY (INHALATION) at 07:48

## 2020-11-03 RX ADMIN — FOLIC ACID 1 MG: 1 TABLET ORAL at 08:24

## 2020-11-03 RX ADMIN — INSULIN GLARGINE 25 UNITS: 100 INJECTION, SOLUTION SUBCUTANEOUS at 13:16

## 2020-11-03 RX ADMIN — POTASSIUM CHLORIDE 10 MEQ: 10 INJECTION, SOLUTION INTRAVENOUS at 08:20

## 2020-11-03 RX ADMIN — Medication 100 MG: at 08:24

## 2020-11-03 RX ADMIN — POTASSIUM CHLORIDE 10 MEQ: 10 INJECTION, SOLUTION INTRAVENOUS at 16:49

## 2020-11-03 RX ADMIN — DEXTROSE AND SODIUM CHLORIDE: 5; .45 INJECTION, SOLUTION INTRAVENOUS at 01:42

## 2020-11-03 RX ADMIN — ATORVASTATIN CALCIUM 20 MG: 20 TABLET, FILM COATED ORAL at 20:42

## 2020-11-03 RX ADMIN — POTASSIUM CHLORIDE 10 MEQ: 10 INJECTION, SOLUTION INTRAVENOUS at 07:00

## 2020-11-03 RX ADMIN — ASPIRIN 81 MG: 81 TABLET, CHEWABLE ORAL at 08:24

## 2020-11-03 RX ADMIN — POTASSIUM CHLORIDE 10 MEQ: 10 INJECTION, SOLUTION INTRAVENOUS at 04:48

## 2020-11-03 RX ADMIN — DEXTROSE AND SODIUM CHLORIDE: 5; 450 INJECTION, SOLUTION INTRAVENOUS at 01:42

## 2020-11-03 RX ADMIN — POTASSIUM CHLORIDE 10 MEQ: 10 INJECTION, SOLUTION INTRAVENOUS at 10:03

## 2020-11-03 RX ADMIN — SODIUM CHLORIDE 0.03 UNITS/KG/HR: 9 INJECTION, SOLUTION INTRAVENOUS at 03:51

## 2020-11-03 RX ADMIN — DEXTROSE AND SODIUM CHLORIDE: 5; 450 INJECTION, SOLUTION INTRAVENOUS at 08:20

## 2020-11-03 RX ADMIN — DEXTROSE AND SODIUM CHLORIDE: 5; 450 INJECTION, SOLUTION INTRAVENOUS at 04:30

## 2020-11-03 RX ADMIN — SODIUM PHOSPHATE, MONOBASIC, MONOHYDRATE 10 MMOL: 276; 142 INJECTION, SOLUTION INTRAVENOUS at 13:12

## 2020-11-03 RX ADMIN — POTASSIUM CHLORIDE 10 MEQ: 10 INJECTION, SOLUTION INTRAVENOUS at 05:58

## 2020-11-03 RX ADMIN — ENOXAPARIN SODIUM 40 MG: 40 INJECTION SUBCUTANEOUS at 08:23

## 2020-11-03 RX ADMIN — INSULIN LISPRO 4 UNITS: 100 INJECTION, SOLUTION INTRAVENOUS; SUBCUTANEOUS at 16:49

## 2020-11-03 RX ADMIN — POTASSIUM CHLORIDE 10 MEQ: 10 INJECTION, SOLUTION INTRAVENOUS at 12:18

## 2020-11-03 RX ADMIN — LISINOPRIL 2.5 MG: 2.5 TABLET ORAL at 08:24

## 2020-11-03 RX ADMIN — BUDESONIDE AND FORMOTEROL FUMARATE DIHYDRATE 2 PUFF: 160; 4.5 AEROSOL RESPIRATORY (INHALATION) at 21:05

## 2020-11-03 NOTE — PLAN OF CARE
Nutrition Problem #1: Inadequate oral intake  Intervention: Food and/or Nutrient Delivery: Continue Current Diet  Nutritional Goals: Oral intakes to meet % of estimated nutrition needs.

## 2020-11-03 NOTE — ED NOTES
Dr. Dukes Factor updated on pt glucose, repeat BMP sent.       [address] [facility], [address] [place] [address]  11/02/20 8161

## 2020-11-03 NOTE — H&P
[facility]  Office: [address], DO, Ap Smithf, DO, Paras Galindo, DO, Ilda Jude Blood, DO, Merritt Juarez MD, Jigna Mederos MD, Arlene Yost MD, Viri Fraser MD, Corby Oconnor MD, Mino Laboy MD, Lilia Borrego MD, Katherine Cartagena MD, Donna Reynoso MD, Maciej Sheets, DO, Roya Lai MD, Dean Brown MD, Alexandr Carlos, DO, Catia Mosqueda MD,  Maxx Arenas DO, Anton Barone MD, Shant Faulkner MD, Petra Langley, [facility], [facility], CNP, Devorah Pacheco, CNP, Rock Wayne, CNS, Steve Izaguirre, CNP, Gerilyn Cockayne, CNP, Herrera Fenton, CNP, Sanaz Gonzalez, CNP, Camillo Schaumann, CNP, Alcides Prakash PA-C, Shawnee Juarez, [facility], Vonda Ocampo, CNP, Boaz Cortes, CNP, Sara Blanchard, CNP, Gita Thompson, CNP, Shashank Weiss, [facility]   [address]    HISTORY AND PHYSICAL EXAMINATION            Date:   11/3/2020  Patient name:  Nafisa Parnell  Date of admission:  11/2/2020  8:01 PM  MRN:   [id number]  Account:  [de-identified]  YOB: 1990  PCP:    Adele Reeder MD  Room:   21/21  Code Status:    Prior    Chief Complaint:     Chief Complaint   Patient presents with    Hyperglycemia       History Obtained From:     patient, electronic medical record    History of Present Illness: Nafisa Parnell is a 31 y.o. / male who presents with Hyperglycemia   and is admitted to the hospital for the management of DKA, type 1, not at goal [facility]). Patient presented to the emergency department with a chief complaint of nausea, vomiting, increased urination, dry mouth, headache, abdominal pain that started today while at work. He was concerned for DKA secondary to his type 1 diabetes. He has been out of his long-acting insulin, Lantus for approximately 1 month. He also stated he was running low on his short acting insulin, Humalog, as of last night.   He states he ran out of his prescriptions because he lost the prescription and could not find it. His only pertinent medical history is type 1 diabetes. He was admitted for DKA 1 month ago. Pertinent labs include initial glucose 667, beta hydroxybutyrate 5.35, sodium 130, chloride 91, CO2 16, anion gap 23, alk phos 187, ALT 95, AST 62, bilirubin 1.68, ionized calcium 1.02, lactic acid 2.97. His urine was positive for ketones and glucose. VBG is as follows: pH 7.35, PCO2 32.9, PO2 108.3, HCO3 18.3, total CO2, 19 base excess 6, O2 sat 98. He is being admitted for further evaluation and management of DKA. Past Medical History:     Past Medical History:   Diagnosis Date    Asthma     Carpal tunnel syndrome, bilateral 3/21/2013    Chronic back pain 3/21/2013        Past Surgical History:     Past Surgical History:   Procedure Laterality Date    NECK SURGERY N/A year of 2010        Medications Prior to Admission:     Prior to Admission medications    Medication Sig Start Date End Date Taking?  Authorizing Provider   insulin aspart (NOVOLOG FLEXPEN) 100 UNIT/ML injection pen Inject 3 Units into the skin 3 times daily (before meals) Use as per the scale provided  Patient taking differently: Inject into the skin 3 times daily (before meals) Use as per the scale provided pt on sliding scale 7/25/18  Yes Adrian Cooley MD   insulin detemir (LEVEMIR FLEXTOUCH) 100 UNIT/ML injection pen Inject 25 Units into the skin daily (with breakfast) 9/8/20 9/3/21  Goldie Cordoba MD   lisinopril (PRINIVIL;ZESTRIL) 2.5 MG tablet Take 1 tablet by mouth daily 9/8/20   Goldie Cordoba MD   folic acid (FOLVITE) 1 MG tablet Take 1 tablet by mouth daily 9/9/20   Goldie Cordoba MD   pantoprazole (PROTONIX) 40 MG tablet Take 1 tablet by mouth every morning (before breakfast) 9/9/20   Laura Alfonso MD   vitamin B-1 100 MG tablet Take 1 tablet by mouth daily 9/9/20   Laura Alfonso MD   Insulin Pen Needle ([employer] PEN NEEDLES) 31G X 6 MM MISC 1 each by Does not apply route daily 9/8/20   Taryn Alfonso MD   ibuprofen (ADVIL;MOTRIN) 800 MG tablet Take 1 tablet by mouth every 8 hours as needed for Pain 6/27/19   Rachael Hidalgo APRN - CNP   aspirin 81 MG chewable tablet Take 1 tablet by mouth daily 7/25/18   Natasha Lee MD   atorvastatin (LIPITOR) 20 MG tablet Take 1 tablet by mouth nightly 7/25/18   Natasha Lee MD   fluticasone-salmeterol (ADVAIR DISKUS) 250-50 MCG/DOSE AEPB Inhale 1 puff into the lungs every 12 hours. 3/21/13   Ehsan Mcneil MD   albuterol (PROVENTIL HFA) 108 (90 BASE) MCG/ACT inhaler Inhale 2 puffs into the lungs every 4 hours as needed for Wheezing. 3/21/13   Tyrone Preston MD        Allergies:     Bee venom and Penicillins    Social History:     Tobacco:    reports that he has been smoking cigarettes. He has a 6.00 pack-year smoking history. He has never used smokeless tobacco.  Alcohol:      reports current alcohol use. Drug Use:  reports current drug use. Drug: Marijuana. Family History:     Family History   Problem Relation Age of Onset   [facility] Schizophrenia Mother     Glaucoma Mother     Asthma Mother        Review of Systems:     Positive and Negative as described in HPI. Review of Systems   Constitutional: Positive for fatigue. Negative for activity change, appetite change, chills, diaphoresis, fever and unexpected weight change. HENT: Negative. Eyes: Negative. Respiratory: Negative. Cardiovascular: Negative. Gastrointestinal: Negative. Endocrine: Positive for polydipsia and polyuria. Negative for cold intolerance, heat intolerance and polyphagia. Genitourinary: Negative. Musculoskeletal: Negative. Skin: Negative. Allergic/Immunologic: Negative. Neurological: Negative. Hematological: Negative. Psychiatric/Behavioral: Negative.         Physical Exam:   /71   Pulse 69   Temp 97.1 °F (36.2 °C) (Temporal)   Resp 16   Wt 190 lb (86.2 kg)   SpO2 98%   BMI 28.06 signs of injury. Right lower leg: No edema. Left lower leg: No edema. Lymphadenopathy:      Cervical: No cervical adenopathy. Skin:     General: Skin is warm and dry. Capillary Refill: Capillary refill takes less than 2 seconds. Coloration: Skin is not jaundiced or pale. Findings: No bruising, erythema, lesion or rash. Neurological:      General: No focal deficit present. Mental Status: He is alert and oriented to person, place, and time. Sensory: No sensory deficit. Motor: No weakness.       Coordination: Coordination normal.   Psychiatric:         Mood and Affect: Mood normal.         Behavior: Behavior normal.         Investigations:      Laboratory Testing:  Recent Results (from the past 24 hour(s))   CBC Auto Differential    Collection Time: 11/02/20  8:34 PM   Result Value Ref Range    WBC 9.1 3.5 - 11.3 k/uL    RBC 4.62 4.21 - 5.77 m/uL    Hemoglobin 14.2 13.0 - 17.0 g/dL    Hematocrit 43.3 40.7 - 50.3 %    MCV 93.7 82.6 - 102.9 fL    MCH 30.7 25.2 - 33.5 pg    MCHC 32.8 28.4 - 34.8 g/dL    RDW 12.9 11.8 - 14.4 %    Platelets 225 641 - 109 k/uL    MPV 11.4 8.1 - 13.5 fL    NRBC Automated 0.0 0.0 per 100 WBC    Differential Type NOT REPORTED     Seg Neutrophils 83 (H) 36 - 65 %    Lymphocytes 11 (L) 24 - 43 %    Monocytes 5 3 - 12 %    Eosinophils % 1 1 - 4 %    Basophils 0 0 - 2 %    Immature Granulocytes 0 0 %    Segs Absolute 7.55 1.50 - 8.10 k/uL    Absolute Lymph # 1.01 (L) 1.10 - 3.70 k/uL    Absolute Mono # 0.42 0.10 - 1.20 k/uL    Absolute Eos # 0.06 0.00 - 0.44 k/uL    Basophils Absolute 0.04 0.00 - 0.20 k/uL    Absolute Immature Granulocyte 0.04 0.00 - 0.30 k/uL    WBC Morphology NOT REPORTED     RBC Morphology NOT REPORTED     Platelet Estimate NOT REPORTED    Urinalysis, reflex to microscopic    Collection Time: 11/02/20  8:34 PM   Result Value Ref Range    Color, UA YELLOW YELLOW    Turbidity UA CLEAR CLEAR    Glucose, Ur 3+ (A) NEGATIVE    Bilirubin Urine NEGATIVE NEGATIVE    Ketones, Urine LARGE (A) NEGATIVE    Specific Gravity, UA 1.029 1.005 - 1.030    Urine Hgb NEGATIVE NEGATIVE    pH, UA 5.0 5.0 - 8.0    Protein, UA NEGATIVE NEGATIVE    Urobilinogen, Urine Normal Normal    Nitrite, Urine NEGATIVE NEGATIVE    Leukocyte Esterase, Urine NEGATIVE NEGATIVE    Urinalysis Comments       Microscopic exam not performed based on chemical results unless requested in original order.    BETA-HYDROXYBUTYRATE    Collection Time: 11/02/20  8:34 PM   Result Value Ref Range    Beta-Hydroxybutyrate 5.35 (H) 0.02 - 0.27 mmol/L   Comprehensive Metabolic Panel w/ Reflex to MG    Collection Time: 11/02/20  8:34 PM   Result Value Ref Range    Glucose 667 (HH) 70 - 99 mg/dL    BUN 19 6 - 20 mg/dL    CREATININE 0.83 0.70 - 1.20 mg/dL    Bun/Cre Ratio NOT REPORTED 9 - 20    Calcium 8.7 8.6 - 10.4 mg/dL    Sodium 130 (L) 135 - 144 mmol/L    Potassium 4.9 3.7 - 5.3 mmol/L    Chloride 91 (L) 98 - 107 mmol/L    CO2 16 (L) 20 - 31 mmol/L    Anion Gap 23 (H) 9 - 17 mmol/L    Alkaline Phosphatase 187 (H) 40 - 129 U/L    ALT 95 (H) 5 - 41 U/L    AST 62 (H) <40 U/L    Total Bilirubin 1.68 (H) 0.3 - 1.2 mg/dL    Total Protein 6.3 (L) 6.4 - 8.3 g/dL    Alb 4.2 3.5 - 5.2 g/dL    Albumin/Globulin Ratio 2.0 1.0 - 2.5    GFR Non-African American >60 >60 mL/min    GFR African American >60 >60 mL/min    GFR Comment          GFR Staging NOT REPORTED    Venous Blood Gas, POC    Collection Time: 11/02/20  9:06 PM   Result Value Ref Range    pH, Demarco 7.353 7.320 - 7.430    pCO2, Demarco 32.9 (L) 41.0 - 51.0 mm Hg    pO2, Demarco 108.3 (H) 30.0 - 50.0 mm Hg    HCO3, Venous 18.3 (L) 22.0 - 29.0 mmol/L    Total CO2, Venous 19 (L) 23.0 - 30.0 mmol/L    Negative Base Excess, Demarco 6 (H) 0.0 - 2.0    Positive Base Excess, Demarco NOT REPORTED 0.0 - 3.0    O2 Sat, Demarco 98 (H) 60.0 - 85.0 %    O2 Device/Flow/% NOT REPORTED     Kayode Test NOT REPORTED     Sample Site NOT REPORTED     Mode NOT REPORTED     FIO2 NOT REPORTED mmol/L    Chloride 98 98 - 107 mmol/L    CO2 13 (L) 20 - 31 mmol/L    Anion Gap 23 (H) 9 - 17 mmol/L    GFR Non-African American >60 >60 mL/min    GFR African American >60 >60 mL/min    GFR Comment          GFR Staging NOT REPORTED    POC Glucose Fingerstick    Collection Time: 11/03/20 12:08 AM   Result Value Ref Range    POC Glucose 301 (H) 75 - 110 mg/dL   POC Glucose Fingerstick    Collection Time: 11/03/20  1:28 AM   Result Value Ref Range    POC Glucose 190 (H) 75 - 110 mg/dL       Imaging/Diagnostics:  No results found. Assessment :      Hospital Problems           Last Modified POA    * (Principal) DKA, type 1, not at goal [facility]) 11/2/2020 Yes    Tobacco use 11/3/2020 Yes          Plan:     Patient status inpatient in the Progressive Unit/Step down    1. Admit as inpatient to the progressive unit under the internal medicine service  2. IV fluid bolusing followed by maintenance fluids at 250 mL an hour under DKA protocol  3. Initiate insulin drip and titrate appropriately under DKA protocol  4. Monitor glucose every hour until blood sugar reaches 250, then monitor glucose every 2 hours  5. Begin long-acting insulin of Levemir while in hospital and substitution of Lantus  6. Monitor BMP, magnesium, phosphorus every 4 hours  7. Monitor daily labs of CBC and BMP  8. One-time A1c  9. Replete electrolytes as needed  10. Maintain n.p.o. status  11. Continue home medications of Lipitor, Symbicort, folic acid, vitamin B1  12. Tobacco use: Smoking cessation education    Consultations:   IP CONSULT TO HOSPITALIST  IP CONSULT TO DIABETES EDUCATOR  IP CONSULT TO DIETITIAN    Patient is admitted as inpatient status because of co-morbidities listed above, severity of signs and symptoms as outlined, requirement for current medical therapies and most importantly because of direct risk to patient if care not provided in a hospital setting. Expected length of stay > 48 hours.     SID Roland CNP  11/3/2020  1:43 AM    Copy sent to Dr. Tracey Valencia MD

## 2020-11-03 NOTE — PLAN OF CARE
Problem: Falls - Risk of:  Goal: Will remain free from falls  Description: Will remain free from falls  11/3/2020 1749 by Munira Wilburn RN  Outcome: Met This Shift  11/3/2020 0523 by Saskia Briceño RN  Outcome: Ongoing  Goal: Absence of physical injury  Description: Absence of physical injury  11/3/2020 1749 by Munira Wilburn RN  Outcome: Met This Shift  11/3/2020 0523 by Saskia Briceño RN  Outcome: Ongoing     Problem: Nutrition  Goal: Optimal nutrition therapy  11/3/2020 1749 by Munira Wilburn RN  Outcome: Ongoing  11/3/2020 1153 by Nawaf Arreola RD, LD  Outcome: Ongoing  Note: Nutrition Problem #1: Inadequate oral intake  Intervention: Food and/or Nutrient Delivery: Continue Current Diet  Nutritional Goals: Oral intakes to meet % of estimated nutrition needs.      Problem: Discharge Planning:  Goal: Discharged to appropriate level of care  Description: Discharged to appropriate level of care  Outcome: Ongoing     Problem: Serum Glucose Level - Abnormal:  Goal: Ability to maintain appropriate glucose levels will improve  Description: Ability to maintain appropriate glucose levels will improve  Outcome: Ongoing

## 2020-11-03 NOTE — PLAN OF CARE
Inhaler / Aerosol Education        [x] Served spacer    [] Provided and reviewed booklet   [x] Good return demonstration per patient   [] Aerosolized Medications:     Verbal education has been provided in the use, benefits and possible adverse reactions of aerosolized medications used in the treatment of this patient.     [] Other:

## 2020-11-03 NOTE — CARE COORDINATION
Case Management Initial Discharge Plan  Nafisa Parnell,             Met with:patient to discuss discharge plans. Information verified: address, contacts, phone number, , insurance Yes    Emergency Contact/Next of Kin name & number: Tripp Burch mother [phone], father Angie Webb [phone]    PCP: Adele Reeder MD  Date of last visit: summer    Insurance Provider: medical mutual and sebastien    Discharge Planning    Living Arrangements:  Alone   Support Systems:  Family Members, Friends/Neighbors    Home has 2 stories  4 stairs to climb to get into front door, flight of stairs to climb to reach second floor  Location of bedroom/bathroom in home upper    Patient able to perform ADL's:Independent    Current Services (outpatient & in home) DME  DME equipment: glucometer which he states he uses  DME provider: na    Receiving oral anticoagulation therapy? No    If indicated:   Physician managing anticoagulation treatment: na  Where does patient obtain lab work for ATC treatment? na      Potential Assistance Needed:  N/A    Patient agreeable to home care: No  [place] of choice provided:  states he kicked the last company out because it was stupid    Prior SNF/Rehab Placement and Facility: none  Agreeable to SNF/Rehab: No  [place] of choice provided: n/a     Evaluation: no    Expected Discharge date:  20    Patient expects to be discharged to:  Home  Follow Up Appointment: Best Day/ Time: Monday AM    Transportation provider: Girlfriend  Transportation arrangements needed for discharge: No    Readmission Risk              Risk of Unplanned Readmission:        14             Does patient have a readmission risk score greater than 14?: No  If yes, follow-up appointment must be made within 7 days of discharge.      Goals of Care: self care    Discharge Plan: Patient relates he has insulin and all supplies for his glucometer at home, he states he went to work and forgot his insulin after eating a large dinner. Will need meds to beds prior to discharging.  Goal is home independent, declines homecare          Electronically signed by Tejinder Salas RN on 11/3/20 at 11:40 AM EST

## 2020-11-03 NOTE — ED NOTES
Pt to ED with c/o feeling hyperglycemic with SS that started around 1600, states he is having nausea, muscle cramping and feeling liehgtheaded. Pt reports not having his insulin pack this morning after eating breakfast, states he took his short acting insulin last night before eating and going to bed. However pt also reports being out of his long acting insulin and has been managing his diabetes with his short acting insulin alone. Pt states he went to work hoping he would be ok not having his insulin today as he couldn't fine it but that was not the case. Pt arrives aox4, VSS and answering all questions appropriately. Pt placed on full cardiac monitor, IV placed, blood obtained, POC glucose complete.          [address]  11/02/20 1281

## 2020-11-03 NOTE — PROGRESS NOTES
Comprehensive Nutrition Assessment    Type and Reason for Visit:  Initial, Consult(DKA/Uncontrolled DM)    Nutrition Recommendations/Plan: Continue current Carb Control diet. Encourage/monitor PO intakes as tolerated. Will review Diabetic Diet education as able. Nutrition Assessment:  Consulted for DKA/Uncontrolled DM. Admitted d/t hyperglycemia with c/o nausea, vomiting, dry mouth, abd pain, headache, and increased urination. At attempted visit pt sleeping. Noted pt has been started on a Carb Control diet. Labs reviewed: Glucose 137-301 mg/dL. Will monitor PO intakes and provide Diabetic Diet review as able. Malnutrition Assessment:  Malnutrition Status: At risk for malnutrition (Comment)    Context:  Acute Illness     Findings of the 6 clinical characteristics of malnutrition:  Energy Intake:  Mild decrease in energy intake (Comment)  Weight Loss:  No significant weight loss     Body Fat Loss:  No significant body fat loss     Muscle Mass Loss:  Unable to assess    Fluid Accumulation:  No significant fluid accumulation     Strength:  Not Performed    Estimated Daily Nutrient Needs:  Energy (kcal):  22-25 kcal/kg = 5165-9495 kcals/day; Weight Used for Energy Requirements:  Admission  Protein (g):  1.0-1.2 gm/kg =  gm pro/day; Weight Used for Protein Requirements:  Ideal          Nutrition Related Findings:  Labs reviewed: Glucose 137-301 mg/dL (H). Meds reviewed: Folic Acid, Thiamine. Wounds:  None       Current Nutrition Therapies:    DIET CARB CONTROL; Carb Control: 4 carb choices (60 gms)/meal    Anthropometric Measures:  · Height: 5' 10\" (177.8 cm)  · Current Body Weight: 184 lb 11.9 oz (83.8 kg)   · Admission Body Weight: 184 lb 11.9 oz (83.8 kg)    · Usual Body Weight: 178 lb 9.2 oz (81 kg)(9/7/20 per chart review)     · Ideal Body Weight: 166 lbs; % Ideal Body Weight 111.3 %   · BMI: 26.5  · BMI Categories: Overweight (BMI 25.0-29. 9)       Nutrition Diagnosis:   · Inadequate oral intake related to endocrine dysfuntion(DKA) as evidenced by (recent start of oral diet; previous nausea/vomiting/abdominal pain)    Nutrition Interventions:   Food and/or Nutrient Delivery:  Continue Current Diet  Nutrition Education/Counseling:  Education not appropriate(Review Diabetic Diet as able.)   Coordination of Nutrition Care:  Continue to monitor while inpatient    Goals:  Oral intakes to meet % of estimated nutrition needs. - Goal Set      Nutrition Monitoring and Evaluation:   Behavioral-Environmental Outcomes:  Knowledge or Skill   Food/Nutrient Intake Outcomes:  Food and Nutrient Intake  Physical Signs/Symptoms Outcomes:  Biochemical Data, GI Status, Hemodynamic Status, Fluid Status or Edema, Weight, Skin, Nutrition Focused Physical Findings     Discharge Planning:     Too soon to determine     Electronically signed by Kellee Frias RD, LD on 11/3/20 at 11:52 AM EST    Contact: [phone]

## 2020-11-03 NOTE — ED NOTES
Pt resting on cot, is now free from pain, NAD noted, will continue to monitor.       580 [facility], [facility]  11/02/20 8257

## 2020-11-03 NOTE — ED PROVIDER NOTES
[facility]     Emergency Department     Faculty Attestation    I performed a history and physical examination of the patient and discussed management with the resident. I have reviewed and agree with the residents findings including all diagnostic interpretations, and treatment plans as written at the time of my review. Any areas of disagreement are noted on the chart. I was personally present for the key portions of any procedures. I have documented in the chart those procedures where I was not present during the key portions. For Physician Assistant/ Nurse Practitioner cases/documentation I have personally evaluated this patient and have completed at least one if not all key elements of the E/M (history, physical exam, and MDM). Additional findings are as noted. This patient was evaluated in the Emergency Department for symptoms described in the history of present illness. The patient was evaluated in the context of the global COVID-19 pandemic, which necessitated consideration that the patient might be at risk for infection with the SARS-CoV-2 virus that causes COVID-19. Institutional protocols and algorithms that pertain to the evaluation of patients at risk for COVID-19 are in a state of rapid change based on information released by regulatory bodies including the CDC and federal and state organizations. These policies and algorithms were followed during the patient's care in the ED. Primary Care Physician: Dorothy Woodard MD    History: This is a 31 y.o. male who presents to the Emergency Department with complaint of hyperglycemia. The patient ran out of his insulin. Is having nausea and vomiting. Physical:   weight is 190 lb (86.2 kg). His temporal temperature is 97.1 °F (36.2 °C). His blood pressure is 154/107 (abnormal) and his pulse is 91. His respiration is 19 and oxygen saturation is 96%. Patient does have dried tongue.   Awake alert oriented moving all extremities    Impression: Hyperglycemia, rule out DKA    Plan: EPOC, CBC, BMP, serum ketones, IV fluid, insulin      (Please note that portions of this note were completed with a voice recognition program.  Efforts were made to edit the dictations but occasionally words are mis-transcribed.)    Destini Stout.  Flor Oh MD, [facility]  Attending Emergency Medicine Physician        Monica Maddox MD  11/02/20 2020

## 2020-11-03 NOTE — PROGRESS NOTES
[facility]  Office: [address], DO, Fernandez Fantasmamaya, DO, Martinez Shown, DO, Audietorsten Dasies Blood, DO, Martín Kelly MD, Mando Pacheco MD, Wendi Cast MD, Mecca Nova MD, Elena Ann MD, Paris Whaley MD, Maria Luz Khan MD, Alicia Bass MD, Donna Huizar MD, Valorie Kevin, DO, Dominga Conroy MD, Ayana Bowie MD, Lucy Florence DO, Grace Mccall MD,  Twin Dawson, DO, Adriano Covington MD, Jules Jerry MD, Masha Nelson [facility], CNP, Cathy Mcrae, CNP, Myriam Saxena, CNS, Sandro Gayle, CNP, Ivory Richardson, CNP, Justin Zaidi, CNP, Dianna Peguero, CNP, Bharti Briscoe, CNP, Susan Chowdary PA-C, Jefry Lujan, [facility], Lacey Rajan, CNP, Uma Ford, CNP, Delaney Moe, CNP, Erlinda Metcalf, CNP, Sandy Carey, [facility]   [address] [facility]    Progress Note    11/3/2020    1:41 PM    Name:   Chandana Espinal  MRN:     [id number]     Acct:      [de-identified]   Room:   [address] [place] [address] Day:  1  Admit Date:  11/2/2020  8:01 PM    PCP:   Camacho Bee MD  Code Status:  Full Code    Subjective:     C/C:   Chief Complaint   Patient presents with    Hyperglycemia     Interval History Status: improved. Patient seen and examined, patient was sleeping but stated that he was hungry. Patient was ordered diet and transition to subcutaneous insulin. Patient bicarb and anion gap improved, insulin drip was discontinued. Brief History:     Chandana Espinal is a 31 y.o. / male who presents with Hyperglycemia   and is admitted to the hospital for the management of DKA, type 1, not at goal [facility]).    Patient presented to the emergency department with a chief complaint of nausea, vomiting, increased urination, dry mouth, headache, abdominal pain that started today while at work. He was concerned for DKA secondary to his type 1 diabetes.   He has been out of his long-acting insulin, Lantus for approximately 1 month. He also stated he was running low on his short acting insulin, Humalog, as of last night. He states he ran out of his prescriptions because he lost the prescription and could not find it. His only pertinent medical history is type 1 diabetes. He was admitted for DKA 1 month ago. Pertinent labs include initial glucose 667, beta hydroxybutyrate 5.35, sodium 130, chloride 91, CO2 16, anion gap 23, alk phos 187, ALT 95, AST 62, bilirubin 1.68, ionized calcium 1.02, lactic acid 2.97. His urine was positive for ketones and glucose. VBG is as follows: pH 7.35, PCO2 32.9, PO2 108.3, HCO3 18.3, total CO2, 19 base excess 6, O2 sat 98. He is being admitted for further evaluation and management of DKA. Review of Systems:     Constitutional:  negative for chills, fevers, sweats  Respiratory:  negative for cough, dyspnea on exertion, shortness of breath, wheezing  Cardiovascular:  negative for chest pain, chest pressure/discomfort, lower extremity edema, palpitations  Gastrointestinal:  negative for abdominal pain, constipation, diarrhea, nausea, vomiting  Neurological:  negative for dizziness, headache    Medications: Allergies:     Allergies   Allergen Reactions    Bee Venom Swelling    Penicillins Hives       Current Meds:   Scheduled Meds:    aspirin  81 mg Oral Daily    atorvastatin  20 mg Oral Nightly    folic acid  1 mg Oral Daily    budesonide-formoterol  2 puff Inhalation BID    lisinopril  2.5 mg Oral Daily    pantoprazole  40 mg Oral QAM AC    thiamine  100 mg Oral Daily    enoxaparin  40 mg Subcutaneous Daily    insulin lispro  0-6 Units Subcutaneous TID WC    insulin lispro  0-3 Units Subcutaneous Nightly    insulin glargine  25 Units Subcutaneous Nightly     Continuous Infusions:    dextrose       PRN Meds: potassium chloride, magnesium sulfate, sodium phosphate IVPB **OR** sodium phosphate IVPB **OR** sodium phosphate IVPB, potassium chloride, sodium phosphate IVPB **OR** sodium phosphate IVPB **OR** sodium phosphate IVPB, glucose, dextrose, glucagon (rDNA), dextrose    Data:     Past Medical History:   has a past medical history of Asthma, Carpal tunnel syndrome, bilateral, and Chronic back pain. Social History:   reports that he has been smoking cigarettes. He has a 6.00 pack-year smoking history. He has never used smokeless tobacco. He reports current alcohol use. He reports current drug use. Drug: Marijuana. Family History:   Family History   Problem Relation Age of Onset   Aslome See Schizophrenia Mother     Glaucoma Mother     Asthma Mother        Vitals:  /63   Pulse 73   Temp 97.6 °F (36.4 °C) (Tympanic)   Resp 16   Ht 5' 10\" (1.778 m)   Wt 184 lb 11.9 oz (83.8 kg)   SpO2 97%   BMI 26.51 kg/m²   Temp (24hrs), Av.5 °F (36.4 °C), Min:97.1 °F (36.2 °C), Max:97.7 °F (36.5 °C)    Recent Labs     20  1000 20  1059 20  1201 20  1318   POCGLU 201* 224* 162* 147*       I/O (24Hr):     Intake/Output Summary (Last 24 hours) at 11/3/2020 1341  Last data filed at 11/3/2020 0523  Gross per 24 hour   Intake  ml   Output --   Net 1990 ml       Labs:  Hematology:  Recent Labs     20  0627   WBC 9.1 8.7   RBC 4.62 4.29   HGB 14.2 13.1   HCT 43.3 39.8*   MCV 93.7 92.8   MCH 30.7 30.5   MCHC 32.8 32.9   RDW 12.9 12.8    192   MPV 11.4 10.6     Chemistry:  Recent Labs     20  2305 20  0627 20  1037   * 139 136   K 4.1 4.0 4.4   CL 98 103 103   CO2 13* 21 19*   GLUCOSE 413* 155* 254*   BUN 17 11 10   CREATININE 0.82 0.62* 0.52*   MG  --  1.9 1.9   ANIONGAP 23* 15 14   LABGLOM >60 >60 >60   GFRAA >60 >60 >60   CALCIUM 7.6* 8.0* 7.8*   PHOS  --  3.6 2.7     Recent Labs     20  0802 20  0859 20  1000 20  1059 20  1201 20  1318   PROT 6.3*  --   --   --   --   --   --   --    LABALBU 4.2  --   --   --   --   --   --   --    AST 62*  --   --   --   --   --   -- --    ALT 95*  --   --   --   --   --   --   --    ALKPHOS 187*  --   --   --   --   --   --   --    BILITOT 1.68*  --   --   --   --   --   --   --    POCGLU  --    < > 169* 133* 201* 224* 162* 147*    < > = values in this interval not displayed. ABG:  Lab Results   Component Value Date    FIO2 NOT REPORTED 11/02/2020     Lab Results   Component Value Date/Time    SPECIAL LT TOP WRIST 4MLS 09/07/2020 08:58 PM     Lab Results   Component Value Date/Time    CULTURE NO GROWTH 6 DAYS 09/07/2020 08:58 PM       Radiology:  No results found. Physical Examination:        General appearance:  alert, cooperative and no distress  Mental Status:  oriented to person, place and time and normal affect  Lungs:  clear to auscultation bilaterally, normal effort  Heart:  regular rate and rhythm, no murmur  Abdomen:  soft, nontender, nondistended, normal bowel sounds, no masses, hepatomegaly, splenomegaly  Extremities:  no edema, redness, tenderness in the calves  Skin:  no gross lesions, rashes, induration    Assessment:        Hospital Problems           Last Modified POA    * (Principal) DKA, type 1, not at goal [facility]) 11/2/2020 Yes    Tobacco use 11/3/2020 Yes          Plan:        1. DKA -noncompliance, bicarb 19, anion gap 14. Patient was transitioned to cutaneous sliding scale and long-acting insulin. Tolerating diet. Possible discharge later, recheck renal function panel now  2. Tobacco abuse  3.  Medical noncompliance    Davidson Bañuelos DO  11/3/2020  1:41 PM

## 2020-11-03 NOTE — ED PROVIDER NOTES
[address] ED  Emergency Department Encounter  EmergencyMedicine Resident     Pt Chidi Vegas  MRN: [id number]  [place] 1990  Date of evaluation: 11/2/20  PCP:  Maximo Rogel MD    [address]       Chief Complaint   Patient presents with    Hyperglycemia       HISTORY OF PRESENT ILLNESS  (Location/Symptom, Timing/Onset, Context/Setting, Quality, Duration, Modifying Factors, Severity.)    This patient was evaluated in the Emergency Department for symptoms described in the history of present illness. He/she was evaluated in the context of the global COVID-19 pandemic, which necessitated consideration that the patient might be at risk for infection with the SARS-CoV-2 virus that causes COVID-19. Institutional protocols and algorithms that pertain to the evaluation of patients at risk for COVID-19 are in a state of rapid change based on information released by regulatory bodies including the CDC and federal and state organizations. These policies and algorithms were followed during the patient's care in the ED. Toya Pacheco is a 31 y.o. male with a past medical history that includes type 1 diabetes presenting emergency department due to nausea, vomiting and concern for DKA. Patient states that he has been out of his long-acting insulin, Lantus, for the past month. He has been trying to get medication refilled. Last night at approximately midnight was his last dose of short acting Humalog. States that he lost rest of this prescription and could not find it. Went to work today hoping that he will could make it through the day but at work developed dry mouth and frequent urination. This is preceded with mild abdominal pain in epigastric region as well as several episodes of nonbloody nonbilious emesis. Patient notes that the last time he went into DKA was approximately a month ago. Had similar symptoms at that time.   No headache, fever, chills, sweats, diarrhea, constipation. No recent illness or sick contact. PAST MEDICAL / SURGICAL / SOCIAL / FAMILY HISTORY      has a past medical history of Asthma, Carpal tunnel syndrome, bilateral, and Chronic back pain. has a past surgical history that includes Neck surgery (N/A, year of 2010). Social History     Socioeconomic History    Marital status: Single     Spouse name: Not on file    Number of children: Not on file    Years of education: Not on file    Highest education level: Not on file   Occupational History    Not on file   Social Needs    Financial resource strain: Not on file    Food insecurity     Worry: Not on file     Inability: Not on file    Transportation needs     Medical: Not on file     Non-medical: Not on file   Tobacco Use    Smoking status: Current Every Day Smoker     Packs/day: 1.50     Years: 4.00     Pack years: 6.00     Types: Cigarettes    Smokeless tobacco: Never Used   Substance and Sexual Activity    Alcohol use:  Yes    Drug use: Yes     Types: Marijuana    Sexual activity: Yes     Partners: Female   Lifestyle    Physical activity     Days per week: Not on file     Minutes per session: Not on file    Stress: Not on file   Relationships    Social connections     Talks on phone: Not on file     Gets together: Not on file     Attends [employer] service: Not on file     Active member of club or organization: Not on file     Attends meetings of clubs or organizations: Not on file     Relationship status: Not on file    Intimate partner violence     Fear of current or ex partner: Not on file     Emotionally abused: Not on file     Physically abused: Not on file     Forced sexual activity: Not on file   Other Topics Concern    Not on file   Social History Narrative    Not on file       Family History   Problem Relation Age of Onset    Schizophrenia Mother     Glaucoma Mother     Asthma Mother        Allergies:  Bee venom and Penicillins    Home Medications:  Prior to Admission medications    Medication Sig Start Date End Date Taking? Authorizing Provider   insulin aspart (NOVOLOG FLEXPEN) 100 UNIT/ML injection pen Inject 3 Units into the skin 3 times daily (before meals) Use as per the scale provided  Patient taking differently: Inject into the skin 3 times daily (before meals) Use as per the scale provided pt on sliding scale 7/25/18  Yes Beatrice Kuamr MD   insulin detemir (LEVEMIR FLEXTOUCH) 100 UNIT/ML injection pen Inject 25 Units into the skin daily (with breakfast) 9/8/20 9/3/21  Светлана Alfonso MD   lisinopril (PRINIVIL;ZESTRIL) 2.5 MG tablet Take 1 tablet by mouth daily 9/8/20   Светлана Alfonso MD   folic acid (FOLVITE) 1 MG tablet Take 1 tablet by mouth daily 9/9/20   Joy Combs MD   pantoprazole (PROTONIX) 40 MG tablet Take 1 tablet by mouth every morning (before breakfast) 9/9/20   Светлана Alfonso MD   vitamin B-1 100 MG tablet Take 1 tablet by mouth daily 9/9/20   Светлана Alfonso MD   Insulin Pen Needle ([employer] PEN NEEDLES) 31G X 6 MM MISC 1 each by Does not apply route daily 9/8/20   Joy Combs MD   ibuprofen (ADVIL;MOTRIN) 800 MG tablet Take 1 tablet by mouth every 8 hours as needed for Pain 6/27/19   SID Wilson - CNP   aspirin 81 MG chewable tablet Take 1 tablet by mouth daily 7/25/18   Beatrice Kumar MD   atorvastatin (LIPITOR) 20 MG tablet Take 1 tablet by mouth nightly 7/25/18   Beatrice Kumar MD   fluticasone-salmeterol (ADVAIR DISKUS) 250-50 MCG/DOSE AEPB Inhale 1 puff into the lungs every 12 hours. 3/21/13   Kristen Butler MD   albuterol (PROVENTIL HFA) 108 (90 BASE) MCG/ACT inhaler Inhale 2 puffs into the lungs every 4 hours as needed for Wheezing. 3/21/13   Betina Larsen MD       REVIEW OF SYSTEMS    (2-9 systems for level 4, 10 or more for level 5)      Review of Systems   Constitutional: Negative for chills and fever. HENT: Negative for congestion.     Eyes: Negative for visual disturbance. Respiratory: Negative for cough and shortness of breath. Cardiovascular: Negative for chest pain. Gastrointestinal: Positive for abdominal pain, nausea and vomiting. Negative for diarrhea. Genitourinary: Negative for dysuria and hematuria. Musculoskeletal: Negative for back pain. Skin: Negative for rash. Neurological: Negative for dizziness, weakness, numbness and headaches. Hematological: Does not bruise/bleed easily. PHYSICAL EXAM   (up to 7 for level 4, 8 or more for level 5)      INITIAL VITALS:   BP (!) 154/107   Pulse 94   Temp 97.1 °F (36.2 °C) (Temporal)   Resp 17   Wt 190 lb (86.2 kg)   SpO2 97%   BMI 28.06 kg/m²     Physical Exam  Constitutional:       General: He is not in acute distress. Appearance: He is well-developed. He is ill-appearing. He is not toxic-appearing. HENT:      Head: Normocephalic and atraumatic. Mouth/Throat:      Mouth: Mucous membranes are dry. Eyes:      Extraocular Movements: Extraocular movements intact. Conjunctiva/sclera: Conjunctivae normal.   Neck:      Musculoskeletal: Normal range of motion. Trachea: No tracheal deviation. Cardiovascular:      Rate and Rhythm: Normal rate and regular rhythm. Pulmonary:      Effort: Pulmonary effort is normal. No respiratory distress. Breath sounds: Normal breath sounds. No stridor. No wheezing, rhonchi or rales. Abdominal:      General: Bowel sounds are normal. There is no distension. Palpations: Abdomen is soft. Tenderness: There is abdominal tenderness (Mild, epigastric). There is no guarding or rebound. Musculoskeletal:         General: No swelling or deformity. Skin:     General: Skin is warm and dry. Neurological:      Mental Status: He is alert and oriented to person, place, and time.          DIFFERENTIAL  DIAGNOSIS     PLAN (LABS / IMAGING / EKG):  Orders Placed This Encounter   Procedures    CBC Auto Differential    Urinalysis, reflex to microscopic    BETA-HYDROXYBUTYRATE    Comprehensive Metabolic Panel w/ Reflex to MG    Hemoglobin and hematocrit, blood    SODIUM (POC)    POTASSIUM (POC)    CHLORIDE (POC)    CALCIUM, IONIC (POC)    HYPOGLYCEMIA TREATMENT: blood glucose less than 50 mg/dL and patient  ALERT and TOLERATING PO    HYPOGLYCEMIA TREATMENT: blood glucose less than 70 mg/dL and patient ALERT and TOLERATING PO    HYPOGLYCEMIA TREATMENT: blood glucose less than 70 mg/dL and patient NOT ALERT or NPO    Inpatient consult to Hospitalist    POC Blood Gas    POCT Glucose    POCT Glucose    Venous Blood Gas, POC    Creatinine W/GFR Point of Care    Lactic Acid, POC    POCT Glucose    Anion Gap (Calc) POC    PATIENT STATUS (FROM ED OR OR/PROCEDURAL) Inpatient       MEDICATIONS ORDERED:  Orders Placed This Encounter   Medications    0.9 % sodium chloride bolus    ondansetron (ZOFRAN) injection 4 mg    glucose (GLUTOSE) 40 % oral gel 15 g    dextrose 50 % IV solution    glucagon (rDNA) injection 1 mg    dextrose 5 % solution    insulin regular (HUMULIN R;NOVOLIN R) 100 Units in sodium chloride 0.9 % 100 mL infusion    promethazine (PHENERGAN) injection 12.5 mg    0.9 % sodium chloride bolus         DIAGNOSTIC RESULTS / EMERGENCY DEPARTMENT COURSE / MDM     Results for orders placed or performed during the hospital encounter of 11/02/20   CBC Auto Differential   Result Value Ref Range    WBC 9.1 3.5 - 11.3 k/uL    RBC 4.62 4.21 - 5.77 m/uL    Hemoglobin 14.2 13.0 - 17.0 g/dL    Hematocrit 43.3 40.7 - 50.3 %    MCV 93.7 82.6 - 102.9 fL    MCH 30.7 25.2 - 33.5 pg    MCHC 32.8 28.4 - 34.8 g/dL    RDW 12.9 11.8 - 14.4 %    Platelets 413 170 - 584 k/uL    MPV 11.4 8.1 - 13.5 fL    NRBC Automated 0.0 0.0 per 100 WBC    Differential Type NOT REPORTED     Seg Neutrophils 83 (H) 36 - 65 %    Lymphocytes 11 (L) 24 - 43 %    Monocytes 5 3 - 12 %    Eosinophils % 1 1 - 4 %    Basophils 0 0 - 2 %    Immature Granulocytes 0 0 % Segs Absolute 7.55 1.50 - 8.10 k/uL    Absolute Lymph # 1.01 (L) 1.10 - 3.70 k/uL    Absolute Mono # 0.42 0.10 - 1.20 k/uL    Absolute Eos # 0.06 0.00 - 0.44 k/uL    Basophils Absolute 0.04 0.00 - 0.20 k/uL    Absolute Immature Granulocyte 0.04 0.00 - 0.30 k/uL    WBC Morphology NOT REPORTED     RBC Morphology NOT REPORTED     Platelet Estimate NOT REPORTED    Urinalysis, reflex to microscopic   Result Value Ref Range    Color, UA YELLOW YELLOW    Turbidity UA CLEAR CLEAR    Glucose, Ur 3+ (A) NEGATIVE    Bilirubin Urine NEGATIVE NEGATIVE    Ketones, Urine LARGE (A) NEGATIVE    Specific Gravity, UA 1.029 1.005 - 1.030    Urine Hgb NEGATIVE NEGATIVE    pH, UA 5.0 5.0 - 8.0    Protein, UA NEGATIVE NEGATIVE    Urobilinogen, Urine Normal Normal    Nitrite, Urine NEGATIVE NEGATIVE    Leukocyte Esterase, Urine NEGATIVE NEGATIVE    Urinalysis Comments       Microscopic exam not performed based on chemical results unless requested in original order.    BETA-HYDROXYBUTYRATE   Result Value Ref Range    Beta-Hydroxybutyrate 5.35 (H) 0.02 - 0.27 mmol/L   Comprehensive Metabolic Panel w/ Reflex to MG   Result Value Ref Range    Glucose 667 (HH) 70 - 99 mg/dL    BUN 19 6 - 20 mg/dL    CREATININE 0.83 0.70 - 1.20 mg/dL    Bun/Cre Ratio NOT REPORTED 9 - 20    Calcium 8.7 8.6 - 10.4 mg/dL    Sodium 130 (L) 135 - 144 mmol/L    Potassium 4.9 3.7 - 5.3 mmol/L    Chloride 91 (L) 98 - 107 mmol/L    CO2 16 (L) 20 - 31 mmol/L    Anion Gap 23 (H) 9 - 17 mmol/L    Alkaline Phosphatase 187 (H) 40 - 129 U/L    ALT 95 (H) 5 - 41 U/L    AST 62 (H) <40 U/L    Total Bilirubin 1.68 (H) 0.3 - 1.2 mg/dL    Total Protein 6.3 (L) 6.4 - 8.3 g/dL    Alb 4.2 3.5 - 5.2 g/dL    Albumin/Globulin Ratio 2.0 1.0 - 2.5    GFR Non-African American >60 >60 mL/min    GFR African American >60 >60 mL/min    GFR Comment          GFR Staging NOT REPORTED    Hemoglobin and hematocrit, blood   Result Value Ref Range    POC Hemoglobin 16.3 13.5 - 17.5 g/dL    POC Hematocrit 48 41 - 53 %   SODIUM (POC)   Result Value Ref Range    POC Sodium 127 (L) 138 - 146 mmol/L   POTASSIUM (POC)   Result Value Ref Range    POC Potassium 4.6 (H) 3.5 - 4.5 mmol/L   CHLORIDE (POC)   Result Value Ref Range    POC Chloride 98 98 - 107 mmol/L   CALCIUM, IONIC (POC)   Result Value Ref Range    POC Ionized Calcium 1.02 (L) 1.15 - 1.33 mmol/L   Venous Blood Gas, POC   Result Value Ref Range    pH, Demarco 7.353 7.320 - 7.430    pCO2, Demarco 32.9 (L) 41.0 - 51.0 mm Hg    pO2, Demarco 108.3 (H) 30.0 - 50.0 mm Hg    HCO3, Venous 18.3 (L) 22.0 - 29.0 mmol/L    Total CO2, Venous 19 (L) 23.0 - 30.0 mmol/L    Negative Base Excess, Demarco 6 (H) 0.0 - 2.0    Positive Base Excess, Demarco NOT REPORTED 0.0 - 3.0    O2 Sat, Demarco 98 (H) 60.0 - 85.0 %    O2 Device/Flow/% NOT REPORTED     Kayode Test NOT REPORTED     Sample Site NOT REPORTED     Mode NOT REPORTED     FIO2 NOT REPORTED     Pt Temp NOT REPORTED     POC pH Temp NOT REPORTED     POC pCO2 Temp NOT REPORTED mm Hg    POC pO2 Temp NOT REPORTED mm Hg   Creatinine W/GFR Point of Care   Result Value Ref Range    POC Creatinine 0.81 0.51 - 1.19 mg/dL    GFR Comment >60 >60 mL/min    GFR Non-African American >60 >60 mL/min    GFR Comment         Lactic Acid, POC   Result Value Ref Range    POC Lactic Acid 2.97 (H) 0.56 - 1.39 mmol/L   POCT Glucose   Result Value Ref Range    POC Glucose 697 (HH) 74 - 100 mg/dL   Anion Gap (Calc) POC   Result Value Ref Range    Anion Gap 11 7 - 16 mmol/L         RADIOLOGY:  No orders to display        IMPRESSION/MDM/EMERGENCY DEPARTMENT COURSE:  Patient came to emergency department, HPI and physical exam were conducted. All nursing notes were reviewed. 79-year-old male with past medical history that includes type 1 diabetes presenting emergency department with concerns for DKA. Was admitted for DKA approximately a month ago. Has been out of Lantus for the past month. Ran out of short acting insulin last night.   Progressively worsening PLAN     DISPOSITION Admitted 11/02/2020 09:30:38 PM      PATIENT REFERRED TO:  No follow-up provider specified.     DISCHARGE MEDICATIONS:  New Prescriptions    No medications on file       Magda Barr DO  Emergency Medicine Resident    (Please note that portions of thisnote were completed with a voice recognition program.  Efforts were made to edit the dictations but occasionally words are mis-transcribed.)       Magda Barr DO  Resident  11/02/20 0556

## 2020-11-04 VITALS
SYSTOLIC BLOOD PRESSURE: 144 MMHG | HEART RATE: 77 BPM | WEIGHT: 188.27 LBS | DIASTOLIC BLOOD PRESSURE: 83 MMHG | RESPIRATION RATE: 16 BRPM | OXYGEN SATURATION: 99 % | BODY MASS INDEX: 26.95 KG/M2 | TEMPERATURE: 98.1 F | HEIGHT: 70 IN

## 2020-11-04 LAB
ALBUMIN SERPL-MCNC: 3.2 G/DL (ref 3.5–5.2)
ANION GAP SERPL CALCULATED.3IONS-SCNC: 14 MMOL/L (ref 9–17)
BUN BLDV-MCNC: 16 MG/DL (ref 6–20)
BUN/CREAT BLD: ABNORMAL (ref 9–20)
CALCIUM SERPL-MCNC: 8 MG/DL (ref 8.6–10.4)
CHLORIDE BLD-SCNC: 106 MMOL/L (ref 98–107)
CO2: 21 MMOL/L (ref 20–31)
CREAT SERPL-MCNC: 0.69 MG/DL (ref 0.7–1.2)
GFR AFRICAN AMERICAN: >60 ML/MIN
GFR NON-AFRICAN AMERICAN: >60 ML/MIN
GFR SERPL CREATININE-BSD FRML MDRD: ABNORMAL ML/MIN/{1.73_M2}
GFR SERPL CREATININE-BSD FRML MDRD: ABNORMAL ML/MIN/{1.73_M2}
GLUCOSE BLD-MCNC: 163 MG/DL (ref 70–99)
GLUCOSE BLD-MCNC: 290 MG/DL (ref 75–110)
HCT VFR BLD CALC: 39.8 % (ref 40.7–50.3)
HEMOGLOBIN: 13 G/DL (ref 13–17)
MAGNESIUM: 2 MG/DL (ref 1.6–2.6)
MCH RBC QN AUTO: 30.7 PG (ref 25.2–33.5)
MCHC RBC AUTO-ENTMCNC: 32.7 G/DL (ref 28.4–34.8)
MCV RBC AUTO: 93.9 FL (ref 82.6–102.9)
NRBC AUTOMATED: 0 PER 100 WBC
PDW BLD-RTO: 13.2 % (ref 11.8–14.4)
PHOSPHORUS: 4.1 MG/DL (ref 2.5–4.5)
PLATELET # BLD: 160 K/UL (ref 138–453)
PMV BLD AUTO: 10.6 FL (ref 8.1–13.5)
POTASSIUM SERPL-SCNC: 3.8 MMOL/L (ref 3.7–5.3)
RBC # BLD: 4.24 M/UL (ref 4.21–5.77)
SODIUM BLD-SCNC: 141 MMOL/L (ref 135–144)
WBC # BLD: 5.3 K/UL (ref 3.5–11.3)

## 2020-11-04 PROCEDURE — 6370000000 HC RX 637 (ALT 250 FOR IP): Performed by: NURSE PRACTITIONER

## 2020-11-04 PROCEDURE — 82947 ASSAY GLUCOSE BLOOD QUANT: CPT

## 2020-11-04 PROCEDURE — 80069 RENAL FUNCTION PANEL: CPT

## 2020-11-04 PROCEDURE — 6370000000 HC RX 637 (ALT 250 FOR IP): Performed by: INTERNAL MEDICINE

## 2020-11-04 PROCEDURE — 94640 AIRWAY INHALATION TREATMENT: CPT

## 2020-11-04 PROCEDURE — 94761 N-INVAS EAR/PLS OXIMETRY MLT: CPT

## 2020-11-04 PROCEDURE — 99239 HOSP IP/OBS DSCHRG MGMT >30: CPT | Performed by: INTERNAL MEDICINE

## 2020-11-04 PROCEDURE — 36415 COLL VENOUS BLD VENIPUNCTURE: CPT

## 2020-11-04 PROCEDURE — 85027 COMPLETE CBC AUTOMATED: CPT

## 2020-11-04 PROCEDURE — 83735 ASSAY OF MAGNESIUM: CPT

## 2020-11-04 RX ORDER — INSULIN GLARGINE 100 [IU]/ML
25 INJECTION, SOLUTION SUBCUTANEOUS NIGHTLY
Qty: 6 ML | Refills: 1 | Status: SHIPPED | OUTPATIENT
Start: 2020-11-04 | End: 2021-05-03 | Stop reason: SDUPTHER

## 2020-11-04 RX ADMIN — FOLIC ACID 1 MG: 1 TABLET ORAL at 08:43

## 2020-11-04 RX ADMIN — INSULIN LISPRO 3 UNITS: 100 INJECTION, SOLUTION INTRAVENOUS; SUBCUTANEOUS at 08:43

## 2020-11-04 RX ADMIN — LISINOPRIL 2.5 MG: 2.5 TABLET ORAL at 08:43

## 2020-11-04 RX ADMIN — Medication 100 MG: at 08:43

## 2020-11-04 RX ADMIN — BUDESONIDE AND FORMOTEROL FUMARATE DIHYDRATE 2 PUFF: 160; 4.5 AEROSOL RESPIRATORY (INHALATION) at 08:30

## 2020-11-04 RX ADMIN — ASPIRIN 81 MG: 81 TABLET, CHEWABLE ORAL at 08:43

## 2020-11-04 NOTE — PROGRESS NOTES
Pt discharged home, verbalized understanding of discharged instructions, walked to exit on own.     Electronically signed by Hima Hay RN on 11/4/2020 at 10:12 AM

## 2020-11-04 NOTE — PROGRESS NOTES
Physical Therapy  DATE: 2020    NAME: Chuck Garnica  MRN: [id number]   : 1990    Patient not seen this date for Physical Therapy due to:  [] Blood transfusion in progress  [] Hemodialysis  []  Patient Declined  [] Spine Precautions   [] Strict Bedrest  [] Surgery/ Procedure  [] Testing      [] Other        [x] PT being discontinued at this time. Patient independent. No further needs. [] PT being discontinued at this time as the patient has been transferred to palliative care. No further needs.     Pete Oseguera, SPT

## 2020-11-04 NOTE — PLAN OF CARE
Problem: Falls - Risk of:  Goal: Will remain free from falls  Description: Will remain free from falls  11/4/2020 0335 by Carrie Saldaña RN  Outcome: Ongoing     Problem: Nutrition  Goal: Optimal nutrition therapy  11/4/2020 0335 by Carrie Saldaña RN  Outcome: Ongoing     Problem: Serum Glucose Level - Abnormal:  Goal: Ability to maintain appropriate glucose levels will improve  Description: Ability to maintain appropriate glucose levels will improve  11/4/2020 0335 by Carrie Saldaña RN  Outcome: Ongoing

## 2020-11-04 NOTE — DISCHARGE INSTR - COC
Continuity of Care Form    Patient Name: Abdirizak Clark   :  1990  MRN:  [id number]    [address] [facility] date:  2020  Discharge date:  ***    Code Status Order: Full Code   Advance Directives:   Advance Care Flowsheet Documentation       Date/Time Healthcare Directive Type of Healthcare Directive Copy in [address] Agent's Name Healthcare Agent's Phone Number    [phone]  No, patient does not have an advance directive for healthcare treatment -- -- -- -- --            Admitting Physician:  Pete Duffy DO  PCP: Char Das MD    Discharging Nurse: [facility] Unit/Room#: 0402/0402-01  Discharging Unit Phone Number: ***    Emergency Contact:   Extended Emergency Contact Information  Primary Emergency Contact: Vickie Perdue  Address: [address] [place] [address], [facility]  Home Phone: [phone]  Relation: Parent  Secondary Emergency Contact: Sarmad Guillermo [facility]  Home Phone: [phone]  Relation: Other    Past Surgical History:  Past Surgical History:   Procedure Laterality Date    NECK SURGERY N/A year of        Immunization History:   Immunization History   Administered Date(s) Administered    Tdap (Boostrix, Adacel) 2015       Active Problems:  Patient Active Problem List   Diagnosis Code    Carpal tunnel syndrome, bilateral G56.03    Pain in both wrists M25.531, M25.532    Asthma J45.909    Muscle spasm M62.838    Chronic back pain M54.9, G89.29    Diabetes mellitus with no complication (HCC) U00.1    Diabetic ketoacidosis without coma associated with type 2 diabetes mellitus ([address] W23.94    Metabolic acidosis G64.4    Type 1 diabetes mellitus with ketoacidosis, uncontrolled (HCC) E10.10    DKA, type 1, not at goal ([address] E10.10    Tobacco use Z72.0       Isolation/Infection:   Isolation            No Isolation          Patient Infection Status       None to display            Nurse Assessment:  Last Vital Signs: BP (!) 144/83   Pulse 77   Temp 98.1 °F (36.7 °C) (Oral)   Resp 16   Ht 5' 10\" (1.778 m)   Wt 188 lb 4.4 oz (85.4 kg)   SpO2 99%   BMI 27.01 kg/m²     Last documented pain score (0-10 scale): Pain Level: 10  Last Weight:   Wt Readings from Last 1 Encounters:   11/04/20 188 lb 4.4 oz (85.4 kg)     Mental Status:  {IP PT MENTAL STATUS:20030:::0}    IV Access:  { ANAND IV ACCESS:347642231:::0}    Nursing Mobility/ADLs:  Walking   {CHP DME ADLs:664926743:::0}  Transfer  {CHP DME ADLs:545346873:::0}  Bathing  {CHP DME ADLs:893660346:::0}  Dressing  {CHP DME ADLs:457570418:::0}  Toileting  {CHP DME ADLs:161009020:::0}  Feeding  {CHP DME ADLs:698348186:::0}  Med Admin  {CHP DME ADLs:146183224:::0}  Med Delivery   { ANAND MED Delivery:138477470:::0}    Wound Care Documentation and Therapy:        Elimination:  Continence: Bowel: {YES / SL:79656}  Bladder: {YES / PD:18613}  Urinary Catheter: {Urinary Catheter:187594603:::0}   Colostomy/Ileostomy/Ileal Conduit: {YES / WT:24111}       Date of Last BM: ***    Intake/Output Summary (Last 24 hours) at 11/4/2020 0906  Last data filed at 11/4/2020 0738  Gross per 24 hour   Intake 1410 ml   Output 700 ml   Net 710 ml     I/O last 3 completed shifts:   In: 0 [I.V.:650]  Out: 700 [Urine:700]    Safety Concerns:     [address] [employer] Safety Concerns:039930469:::0}    Impairments/Disabilities:      [address] [employer] Impairments/Disabilities:893442030:::0}    Nutrition Therapy:  Current Nutrition Therapy:   [employer] Diet List:849522981:::0}    Routes of Feeding: {CHP DME Other Feedings:346334788:::0}  Liquids: {Slp liquid thickness:89819}  Daily Fluid Restriction: {CHP DME Yes amt example:592197077:::0}  Last Modified Barium Swallow with Video (Video Swallowing Test): {Done Not Done YNMZ:174701109:::1}    Treatments at the Time of Hospital Discharge:   Respiratory Treatments: ***  Oxygen Therapy:  {Therapy; copd oxygen:29899:::0}  Ventilator:    { CC Vent List:046535298:::0}    Rehab Therapies:

## 2020-11-04 NOTE — DISCHARGE SUMMARY
[facility]  Office: [address], DO, Rachell Alejandro DO, Chris Kapoor DO, Lennox Mars Blood, DO, Gurpreet Ybarra MD, Angel Booth MD, Guilherme Rivera MD, Cristhian Conner MD, Bisi Cox MD, Jeannette Ramírez MD, Heri Forbes MD, Arlene Anderson MD, Donna Espinoza MD, Jocelyne Juárez DO, Cj Marquez MD, Deshawn García MD, Ana Rosa Saavedra DO, Kaycee Alvares MD,  Estuardo Hutchins DO, Burna Cockayne, MD, Santa Partida MD, Al Carpenter [facility], [facility], CNP, Marbella Sanches, CNP, Paddy Duane, CNS, Maeve Roblero, CNP, Kirstin Naidu, CNP, Cody Miranda, CNP, Kimberly Brown, CNP, Araceli Merino, CNP, Claritza Mayorga PA-C, Daisey Meigs, [facility], Rosio Tong, CNP, Ernestine Graham, CNP, Paty Pete, CNP, Ivory Harkins, CNP, Alyssa Oneilhal, [facility]    Discharge Summary     Patient ID: Harpreet Solorio  :  1990   MRN: [id number]     ACCOUNT:  [de-identified]   Patient's PCP: Isaias Segura MD  Admit Date: 2020   Discharge Date: 2020     Length of Stay: 2  Code Status:  Full Code  Admitting Physician: Anastasia Keys DO  Discharge Physician: Ana Rosa Saavedra DO     Active Discharge Diagnoses:     Hospital Problem Lists:  Principal Problem:    DKA, type 1, not at goal [facility])  Active Problems:    Tobacco use  Resolved Problems:    * No resolved hospital problems. *      Admission Condition:  good     Discharged Condition: good    Hospital Stay:     Hospital Course: Chely Gallego a 31 y.o. / male who presents with Hyperglycemia   and is admitted to the hospital for the management of DKA, type 1, not at goal [facility]).      Patient presented to the emergency department with a chief complaint of nausea, vomiting, increased urination, dry mouth, headache, abdominal pain that started today while at work. Zulma Schultz was concerned for DKA secondary to his type 1 diabetes. Zulma Schultz has been out of his long-acting insulin, Lantus for approximately 1 month. [facility] also stated he was running low on his short acting insulin, Humalog, as of last night. [facility] states he ran out of his prescriptions because he lost the prescription and could not find it.  His only pertinent medical history is type 1 diabetes.  He was admitted for DKA 1 month ago.  Pertinent labs include initial glucose 667, beta hydroxybutyrate 5.35, sodium 130, chloride 91, CO2 16, anion gap 23, alk phos 187, ALT 95, AST 62, bilirubin 1.68, ionized calcium 1.02, lactic acid 2.97.  His urine was positive for ketones and glucose.  VBG is as follows: pH 7.35, PCO2 32.9, PO2 108.3, HCO3 18.3, total CO2, 19 base excess 6, O2 sat 98.  He is being admitted for further evaluation and management of DKA. Was placed on insulin drip for DKA, resolved the following day but patient still nauseous and tired. Patient eventually was given a diet which she tolerated, insulin drip was transitioned. Patient remained somnolent afterwards but still eating multiple meals, this morning patient feeling much better and has been tolerating diet well. Requested refills of long-acting medications.   Patient agreeable discharge today      Significant therapeutic interventions: None    Significant Diagnostic Studies:   Labs / Micro:  CBC:   Lab Results   Component Value Date    WBC 5.3 11/04/2020    RBC 4.24 11/04/2020    HGB 13.0 11/04/2020    HCT 39.8 11/04/2020    MCV 93.9 11/04/2020    MCH 30.7 11/04/2020    MCHC 32.7 11/04/2020    RDW 13.2 11/04/2020     11/04/2020     BMP:    Lab Results   Component Value Date    GLUCOSE 163 11/04/2020     11/04/2020    K 3.8 11/04/2020     11/04/2020    CO2 21 11/04/2020    ANIONGAP 14 11/04/2020    BUN 16 11/04/2020    CREATININE 0.69 11/04/2020    BUNCRER NOT REPORTED 11/04/2020    CALCIUM 8.0 11/04/2020    LABGLOM >60 11/04/2020    GFRAA >60 11/04/2020    GFR      11/04/2020    GFR NOT REPORTED 11/04/2020     CMP:    Lab Results   Component Value Date    GLUCOSE 163 11/04/2020     11/04/2020    K 3.8 11/04/2020     11/04/2020    CO2 21 11/04/2020    BUN 16 11/04/2020    CREATININE 0.69 11/04/2020    ANIONGAP 14 11/04/2020    ALKPHOS 187 11/02/2020    ALT 95 11/02/2020    AST 62 11/02/2020    BILITOT 1.68 11/02/2020    LABALBU 3.2 11/04/2020    ALBUMIN 2.0 11/02/2020    LABGLOM >60 11/04/2020    GFRAA >60 11/04/2020    GFR      11/04/2020    GFR NOT REPORTED 11/04/2020    PROT 6.3 11/02/2020    CALCIUM 8.0 11/04/2020        Radiology:  No results found. Consultations:    Consults:     Final Specialist Recommendations/Findings:   IP CONSULT TO HOSPITALIST  IP CONSULT TO DIABETES EDUCATOR  IP CONSULT TO DIETITIAN      The patient was seen and examined on day of discharge and this discharge summary is in conjunction with any daily progress note from day of discharge.     Discharge plan:     Disposition: Home    Physician Follow Up:   Sridhar Lira MD  [address]  [phone]    Schedule an appointment as soon as possible for a visit in 1 week  hospital followup       Requiring Further Evaluation/Follow Up POST HOSPITALIZATION/Incidental Findings:     Diet: diabetic diet    Activity: As tolerated    Instructions to Patient: Followup PCP 1-2 weeks    Discharge Medications:      Medication List      START taking these medications    Basaglar KwikPen 100 UNIT/ML injection pen  Generic drug:  insulin glargine  Inject 25 Units into the skin nightly        CHANGE how you take these medications    insulin aspart 100 UNIT/ML injection pen  Commonly known as:  NovoLOG FlexPen  Inject 3 Units into the skin 3 times daily (before meals) Use as per the scale provided  What changed:    · how much to take  · additional instructions        CONTINUE taking these medications    albuterol sulfate  (90 Base) MCG/ACT inhaler  Commonly known as:  Proventil HFA  Inhale 2 puffs into the lungs every 4 hours as needed for Wheezing. aspirin 81 MG chewable tablet  Take 1 tablet by mouth daily     atorvastatin 20 MG tablet  Commonly known as:  LIPITOR  Take 1 tablet by mouth nightly     fluticasone-salmeterol 250-50 MCG/DOSE Aepb  Commonly known as:  Advair Diskus  Inhale 1 puff into the lungs every 12 hours. folic acid 1 MG tablet  Commonly known as:  FOLVITE  Take 1 tablet by mouth daily     ibuprofen 800 MG tablet  Commonly known as:  ADVIL;MOTRIN  Take 1 tablet by mouth every 8 hours as needed for Pain     [employer] Pen Needles 31G X 6 MM Misc  Generic drug:  Insulin Pen Needle  1 each by Does not apply route daily     lisinopril 2.5 MG tablet  Commonly known as:  PRINIVIL;ZESTRIL  Take 1 tablet by mouth daily     pantoprazole 40 MG tablet  Commonly known as:  PROTONIX  Take 1 tablet by mouth every morning (before breakfast)     thiamine 100 MG tablet  Take 1 tablet by mouth daily        STOP taking these medications    Levemir FlexTouch 100 UNIT/ML injection pen  Generic drug:  insulin detemir           Where to Get Your Medications      These medications were sent to [facility] [address], [address] [place] [address].    Phone:  [phone]   · Perez Bhardwaj 100 UNIT/ML injection pen         No discharge procedures on file. Time Spent on discharge is  31 mins in patient examination, evaluation, counseling as well as medication reconciliation, prescriptions for required medications, discharge plan and follow up. Electronically signed by   Shelli Rodriguez DO  11/4/2020  9:06 AM      Thank you Dr. Mayito Velasquez MD for the opportunity to be involved in this patient's care.

## 2020-11-04 NOTE — PROGRESS NOTES
[facility]  Office: [address], DO, Rebeca Ring, DO, Sherice Wilsons, DO, Tone Michelle, DO, Helen Capellan MD, Brittani Waggoner MD, Miriam Parker MD, Eileen Ham MD, Alycia Amador MD, Teresa Calderon MD, Joy Contreras MD, Cara Clement MD, Donna Marroquin MD, Peterson Ulloa DO, Alyssa Sanches MD, Kathie Negro MD, James Bhatt DO, Damaris Blanco MD,  Anel Michelle DO, Azul Zurita MD, Chelsie Newby MD, Erica Grigsby, [facility], [facility] Nava, CNP, Richar Ferguson, CNP, Samy Nichols, [facility], Cristi Lucia, CNP, Maia Marcelino, CNP, Tom Castañeda, CNP, Radha Ceballos, CNP, Anton Contreras, CNP, BAILEY LomeliC, Stephy Shrestha, [facility], Christine Cheng, CNP, Manuela Nails, CNP, Nathalie Lemon, CNP, Carmina Garcia, CNP, Lul Herzog, CNP         [facility]   [address] [facility]    Progress Note    11/4/2020    10:28 AM    Name:   Nicole Payton  MRN:     [id number]     Acct:      [de-identified]   Room:   [address] [place] [address] Day:  2  Admit Date:  11/2/2020  8:01 PM    PCP:   Gordo Hay MD  Code Status:  Full Code    Subjective:     C/C:   Chief Complaint   Patient presents with    Hyperglycemia     Interval History Status: improved. Feeling much better this morning, denies any pain, requested refill of long-acting insulin, tolerated diet this morning. Agreeable to discharge    Brief History:     Nicole Payton is a 31 y.o. / male who presents with Hyperglycemia   and is admitted to the hospital for the management of DKA, type 1, not at goal [facility]).    Patient presented to the emergency department with a chief complaint of nausea, vomiting, increased urination, dry mouth, headache, abdominal pain that started today while at work. He was concerned for DKA secondary to his type 1 diabetes. He has been out of his long-acting insulin, Lantus for approximately 1 month.   He also stated he was running low on his short acting insulin, Humalog, as of last night. He states he ran out of his prescriptions because he lost the prescription and could not find it. His only pertinent medical history is type 1 diabetes. He was admitted for DKA 1 month ago. Pertinent labs include initial glucose 667, beta hydroxybutyrate 5.35, sodium 130, chloride 91, CO2 16, anion gap 23, alk phos 187, ALT 95, AST 62, bilirubin 1.68, ionized calcium 1.02, lactic acid 2.97. His urine was positive for ketones and glucose. VBG is as follows: pH 7.35, PCO2 32.9, PO2 108.3, HCO3 18.3, total CO2, 19 base excess 6, O2 sat 98. He is being admitted for further evaluation and management of DKA. Review of Systems:     Constitutional:  negative for chills, fevers, sweats  Respiratory:  negative for cough, dyspnea on exertion, shortness of breath, wheezing  Cardiovascular:  negative for chest pain, chest pressure/discomfort, lower extremity edema, palpitations  Gastrointestinal:  negative for abdominal pain, constipation, diarrhea, nausea, vomiting  Neurological:  negative for dizziness, headache    Medications: Allergies:     Allergies   Allergen Reactions    Bee Venom Swelling    Penicillins Hives       Current Meds:   Scheduled Meds:    aspirin  81 mg Oral Daily    atorvastatin  20 mg Oral Nightly    folic acid  1 mg Oral Daily    budesonide-formoterol  2 puff Inhalation BID    lisinopril  2.5 mg Oral Daily    pantoprazole  40 mg Oral QAM AC    thiamine  100 mg Oral Daily    enoxaparin  40 mg Subcutaneous Daily    insulin lispro  0-6 Units Subcutaneous TID WC    insulin lispro  0-3 Units Subcutaneous Nightly    insulin glargine  25 Units Subcutaneous Nightly     Continuous Infusions:    dextrose       PRN Meds: potassium chloride, magnesium sulfate, sodium phosphate IVPB **OR** sodium phosphate IVPB **OR** sodium phosphate IVPB, potassium chloride, sodium phosphate IVPB **OR** sodium phosphate IVPB **OR** sodium phosphate IVPB, glucose, dextrose, --   --   --  3.3*  --   --  3.2*  --    LABA1C  --   --  9.4*  --   --   --   --   --   --   --   --   --    AST 62*  --   --   --   --   --   --   --   --   --   --   --    ALT 95*  --   --   --   --   --   --   --   --   --   --   --    ALKPHOS 187*  --   --   --   --   --   --   --   --   --   --   --    BILITOT 1.68*  --   --   --   --   --   --   --   --   --   --   --    POCGLU  --    < >  --    < > 224* 162* 147*  --  307* 249*  --  290*    < > = values in this interval not displayed. ABG:  Lab Results   Component Value Date    FIO2 NOT REPORTED 11/02/2020     Lab Results   Component Value Date/Time    SPECIAL LT TOP WRIST 4MLS 09/07/2020 08:58 PM     Lab Results   Component Value Date/Time    CULTURE NO GROWTH 6 DAYS 09/07/2020 08:58 PM       Radiology:  No results found. Physical Examination:        General appearance:  alert, cooperative and no distress  Mental Status:  oriented to person, place and time and normal affect  Lungs:  clear to auscultation bilaterally, normal effort  Heart:  regular rate and rhythm, no murmur  Abdomen:  soft, nontender, nondistended, normal bowel sounds, no masses, hepatomegaly, splenomegaly  Extremities:  no edema, redness, tenderness in the calves  Skin:  no gross lesions, rashes, induration    Assessment:        Hospital Problems           Last Modified POA    * (Principal) DKA, type 1, not at goal [facility]) 11/2/2020 Yes    Tobacco use 11/3/2020 Yes          Plan:        1. Tolerating diet this morning, blood sugars increased as well. Patient requested refills of long-acting insulin which was provided. We will follow-up with primary care physician in 1 to 2 weeks.   Agreeable with discharge    Shelli Rodriguez DO  11/4/2020  10:28 AM

## 2021-01-22 ENCOUNTER — APPOINTMENT (OUTPATIENT)
Dept: GENERAL RADIOLOGY | Age: 31
End: 2021-01-22
Payer: COMMERCIAL

## 2021-01-22 ENCOUNTER — HOSPITAL ENCOUNTER (EMERGENCY)
Age: 31
Discharge: HOME | End: 2021-01-22
Attending: EMERGENCY MEDICINE
Payer: COMMERCIAL

## 2021-01-22 VITALS
HEART RATE: 99 BPM | TEMPERATURE: 98.5 F | DIASTOLIC BLOOD PRESSURE: 95 MMHG | RESPIRATION RATE: 21 BRPM | OXYGEN SATURATION: 94 % | WEIGHT: 185 LBS | SYSTOLIC BLOOD PRESSURE: 155 MMHG | BODY MASS INDEX: 27.4 KG/M2 | HEIGHT: 69 IN

## 2021-01-22 DIAGNOSIS — U07.1 COVID-19: Primary | ICD-10-CM

## 2021-01-22 LAB
ABSOLUTE EOS #: 0.17 K/UL (ref 0–0.44)
ABSOLUTE IMMATURE GRANULOCYTE: 0.09 K/UL (ref 0–0.3)
ABSOLUTE LYMPH #: 0.69 K/UL (ref 1.1–3.7)
ABSOLUTE MONO #: 0.52 K/UL (ref 0.1–1.2)
ALLEN TEST: ABNORMAL
ANION GAP SERPL CALCULATED.3IONS-SCNC: 13 MMOL/L (ref 9–17)
ANION GAP: 13 MMOL/L (ref 7–16)
BASOPHILS # BLD: 1 % (ref 0–2)
BASOPHILS ABSOLUTE: 0.09 K/UL (ref 0–0.2)
BETA-HYDROXYBUTYRATE: 0.14 MMOL/L (ref 0.02–0.27)
BILIRUBIN URINE: NEGATIVE
BUN BLDV-MCNC: 15 MG/DL (ref 6–20)
BUN/CREAT BLD: ABNORMAL (ref 9–20)
CALCIUM SERPL-MCNC: 8.8 MG/DL (ref 8.6–10.4)
CHLORIDE BLD-SCNC: 100 MMOL/L (ref 98–107)
CO2: 21 MMOL/L (ref 20–31)
COLOR: YELLOW
COMMENT UA: ABNORMAL
CREAT SERPL-MCNC: 0.69 MG/DL (ref 0.7–1.2)
DIFFERENTIAL TYPE: ABNORMAL
EOSINOPHILS RELATIVE PERCENT: 2 % (ref 1–4)
FIO2: ABNORMAL
GFR AFRICAN AMERICAN: >60 ML/MIN
GFR NON-AFRICAN AMERICAN: >60 ML/MIN
GFR NON-AFRICAN AMERICAN: >60 ML/MIN
GFR SERPL CREATININE-BSD FRML MDRD: >60 ML/MIN
GFR SERPL CREATININE-BSD FRML MDRD: ABNORMAL ML/MIN/{1.73_M2}
GFR SERPL CREATININE-BSD FRML MDRD: ABNORMAL ML/MIN/{1.73_M2}
GFR SERPL CREATININE-BSD FRML MDRD: NORMAL ML/MIN/{1.73_M2}
GLUCOSE BLD-MCNC: 341 MG/DL (ref 75–110)
GLUCOSE BLD-MCNC: 373 MG/DL (ref 74–100)
GLUCOSE BLD-MCNC: 378 MG/DL (ref 70–99)
GLUCOSE URINE: ABNORMAL
HCO3 VENOUS: 22.6 MMOL/L (ref 22–29)
HCT VFR BLD CALC: 42.2 % (ref 40.7–50.3)
HEMOGLOBIN: 14 G/DL (ref 13–17)
IMMATURE GRANULOCYTES: 1 %
KETONES, URINE: NEGATIVE
LEUKOCYTE ESTERASE, URINE: NEGATIVE
LYMPHOCYTES # BLD: 8 % (ref 24–43)
MCH RBC QN AUTO: 31.3 PG (ref 25.2–33.5)
MCHC RBC AUTO-ENTMCNC: 33.2 G/DL (ref 28.4–34.8)
MCV RBC AUTO: 94.2 FL (ref 82.6–102.9)
MODE: ABNORMAL
MONOCYTES # BLD: 6 % (ref 3–12)
MORPHOLOGY: NORMAL
NEGATIVE BASE EXCESS, VEN: 1 (ref 0–2)
NITRITE, URINE: NEGATIVE
NRBC AUTOMATED: 0 PER 100 WBC
O2 DEVICE/FLOW/%: ABNORMAL
O2 SAT, VEN: 95 % (ref 60–85)
PATIENT TEMP: ABNORMAL
PCO2, VEN: 35.2 MM HG (ref 41–51)
PDW BLD-RTO: 13.3 % (ref 11.8–14.4)
PH UA: 6.5 (ref 5–8)
PH VENOUS: 7.42 (ref 7.32–7.43)
PLATELET # BLD: 164 K/UL (ref 138–453)
PLATELET ESTIMATE: ABNORMAL
PMV BLD AUTO: 10.5 FL (ref 8.1–13.5)
PO2, VEN: 71.7 MM HG (ref 30–50)
POC CHLORIDE: 100 MMOL/L (ref 98–107)
POC CREATININE: 0.65 MG/DL (ref 0.51–1.19)
POC HEMATOCRIT: 43 % (ref 41–53)
POC HEMOGLOBIN: 14.5 G/DL (ref 13.5–17.5)
POC IONIZED CALCIUM: 1.14 MMOL/L (ref 1.15–1.33)
POC LACTIC ACID: 3.23 MMOL/L (ref 0.56–1.39)
POC PCO2 TEMP: ABNORMAL MM HG
POC PH TEMP: ABNORMAL
POC PO2 TEMP: ABNORMAL MM HG
POC POTASSIUM: 3.6 MMOL/L (ref 3.5–4.5)
POC SODIUM: 136 MMOL/L (ref 138–146)
POSITIVE BASE EXCESS, VEN: ABNORMAL (ref 0–3)
POTASSIUM SERPL-SCNC: 4.1 MMOL/L (ref 3.7–5.3)
PROTEIN UA: NEGATIVE
RBC # BLD: 4.48 M/UL (ref 4.21–5.77)
RBC # BLD: ABNORMAL 10*6/UL
SAMPLE SITE: ABNORMAL
SARS-COV-2, RAPID: DETECTED
SARS-COV-2: ABNORMAL
SARS-COV-2: ABNORMAL
SEG NEUTROPHILS: 82 % (ref 36–65)
SEGMENTED NEUTROPHILS ABSOLUTE COUNT: 7.04 K/UL (ref 1.5–8.1)
SERUM OSMOLALITY: 299 MOSM/KG (ref 275–295)
SODIUM BLD-SCNC: 134 MMOL/L (ref 135–144)
SOURCE: ABNORMAL
SPECIFIC GRAVITY UA: 1.03 (ref 1–1.03)
TOTAL CO2, VENOUS: 24 MMOL/L (ref 23–30)
TROPONIN INTERP: NORMAL
TROPONIN INTERP: NORMAL
TROPONIN T: NORMAL NG/ML
TROPONIN T: NORMAL NG/ML
TROPONIN, HIGH SENSITIVITY: <6 NG/L (ref 0–22)
TROPONIN, HIGH SENSITIVITY: <6 NG/L (ref 0–22)
TURBIDITY: CLEAR
URINE HGB: NEGATIVE
UROBILINOGEN, URINE: NORMAL
WBC # BLD: 8.6 K/UL (ref 3.5–11.3)
WBC # BLD: ABNORMAL 10*3/UL

## 2021-01-22 PROCEDURE — 80048 BASIC METABOLIC PNL TOTAL CA: CPT

## 2021-01-22 PROCEDURE — 82010 KETONE BODYS QUAN: CPT

## 2021-01-22 PROCEDURE — 82565 ASSAY OF CREATININE: CPT

## 2021-01-22 PROCEDURE — 84132 ASSAY OF SERUM POTASSIUM: CPT

## 2021-01-22 PROCEDURE — 82803 BLOOD GASES ANY COMBINATION: CPT

## 2021-01-22 PROCEDURE — 84484 ASSAY OF TROPONIN QUANT: CPT

## 2021-01-22 PROCEDURE — 82947 ASSAY GLUCOSE BLOOD QUANT: CPT

## 2021-01-22 PROCEDURE — 83605 ASSAY OF LACTIC ACID: CPT

## 2021-01-22 PROCEDURE — 82330 ASSAY OF CALCIUM: CPT

## 2021-01-22 PROCEDURE — 81003 URINALYSIS AUTO W/O SCOPE: CPT

## 2021-01-22 PROCEDURE — 6360000002 HC RX W HCPCS: Performed by: STUDENT IN AN ORGANIZED HEALTH CARE EDUCATION/TRAINING PROGRAM

## 2021-01-22 PROCEDURE — 96374 THER/PROPH/DIAG INJ IV PUSH: CPT

## 2021-01-22 PROCEDURE — U0002 COVID-19 LAB TEST NON-CDC: HCPCS

## 2021-01-22 PROCEDURE — 99284 EMERGENCY DEPT VISIT MOD MDM: CPT

## 2021-01-22 PROCEDURE — 83930 ASSAY OF BLOOD OSMOLALITY: CPT

## 2021-01-22 PROCEDURE — 71045 X-RAY EXAM CHEST 1 VIEW: CPT

## 2021-01-22 PROCEDURE — 82435 ASSAY OF BLOOD CHLORIDE: CPT

## 2021-01-22 PROCEDURE — 84295 ASSAY OF SERUM SODIUM: CPT

## 2021-01-22 PROCEDURE — 2580000003 HC RX 258: Performed by: STUDENT IN AN ORGANIZED HEALTH CARE EDUCATION/TRAINING PROGRAM

## 2021-01-22 PROCEDURE — 96375 TX/PRO/DX INJ NEW DRUG ADDON: CPT

## 2021-01-22 PROCEDURE — 94760 N-INVAS EAR/PLS OXIMETRY 1: CPT

## 2021-01-22 PROCEDURE — 85014 HEMATOCRIT: CPT

## 2021-01-22 PROCEDURE — 85025 COMPLETE CBC W/AUTO DIFF WBC: CPT

## 2021-01-22 RX ORDER — PROCHLORPERAZINE EDISYLATE 5 MG/ML
10 INJECTION INTRAMUSCULAR; INTRAVENOUS ONCE
Status: COMPLETED | OUTPATIENT
Start: 2021-01-22 | End: 2021-01-22

## 2021-01-22 RX ORDER — 0.9 % SODIUM CHLORIDE 0.9 %
1000 INTRAVENOUS SOLUTION INTRAVENOUS ONCE
Status: COMPLETED | OUTPATIENT
Start: 2021-01-22 | End: 2021-01-22

## 2021-01-22 RX ORDER — DIPHENHYDRAMINE HYDROCHLORIDE 50 MG/ML
25 INJECTION INTRAMUSCULAR; INTRAVENOUS EVERY 6 HOURS PRN
Status: DISCONTINUED | OUTPATIENT
Start: 2021-01-22 | End: 2021-01-22 | Stop reason: HOSPADM

## 2021-01-22 RX ADMIN — PROCHLORPERAZINE EDISYLATE 10 MG: 5 INJECTION INTRAMUSCULAR; INTRAVENOUS at 05:28

## 2021-01-22 RX ADMIN — SODIUM CHLORIDE 1000 ML: 9 INJECTION, SOLUTION INTRAVENOUS at 05:09

## 2021-01-22 RX ADMIN — DIPHENHYDRAMINE HYDROCHLORIDE 25 MG: 50 INJECTION, SOLUTION INTRAMUSCULAR; INTRAVENOUS at 05:28

## 2021-01-22 ASSESSMENT — ENCOUNTER SYMPTOMS
COUGH: 0
SHORTNESS OF BREATH: 0
VOMITING: 1
PHOTOPHOBIA: 0
SORE THROAT: 0
ABDOMINAL PAIN: 0
TROUBLE SWALLOWING: 0
NAUSEA: 1

## 2021-01-22 NOTE — ED PROVIDER NOTES
[facility]  Emergency Department  Faculty Attestation       I performed a history and physical examination of the patient and discussed management with the resident. I reviewed the residents note and agree with the documented findings including all diagnostic interpretations and plan of care. Any areas of disagreement are noted on the chart. I was personally present for the key portions of any procedures. I have documented in the chart those procedures where I was not present during the key portions. I have reviewed the emergency nurses triage note. I agree with the chief complaint, past medical history, past surgical history, allergies, medications, social and family history as documented unless otherwise noted below. Documentation of the HPI, Physical Exam and Medical Decision Making performed by scribes is based on my personal performance of the HPI, PE and MDM. For Physician Assistant/ Nurse Practitioner cases/documentation I have personally evaluated this patient and have completed at least one if not all key elements of the E/M (history, physical exam, and MDM). Additional findings are as noted. Pertinent Comments     Primary Care Physician: Lonnie Payne MD    ED Triage Vitals   BP Temp Temp Source Pulse Resp SpO2 Height Weight   01/22/21 0500 01/22/21 0459 01/22/21 0459 01/22/21 0500 01/22/21 0500 01/22/21 0500 01/22/21 0500 01/22/21 0500   (!) 155/95 97.3 °F (36.3 °C) Temporal 96 28 96 % 5' 9\" (1.753 m) 185 lb (83.9 kg)        History/Physical: This is a 31 y.o. male who presents to the Emergency Department with complaint of chest pain, headache, fatigue, and elevated glucose. Patient has a h/o T1DM w/ history of DKA. Had elevated sugar at home and took 16 u prior to arrival.  States that he has a headache that is severe. Has a history of similar headaches, but much is lasting longer. Also has been having diffuse body aches. Having left-sided chest pain that is intermittent. No vomiting. On exam, patient appears to feel ill but is nontoxic appearing. Normocephalic atraumatic with dry mucous membranes. Heart sounds are mildly tachycardic. Increased respirations but air entry equally bilaterally. Abdomen soft nontender. No significant edema of the legs or arms. Alert and oriented moving all tremors equally with no drift. MDM/Plan:   Multiple complaints including chest pain, headache, and elevated blood sugar. History of DKA. Initial sugar in the 300s, but pH is 7.4. Does have a mild elevated lactic acid, but does appear to be dehydrated. Also having chest pain. Will get cardiac work-up. Due to concern for possible viral illness given patient's constellation of symptoms, this also may be early onset DKA, and he partially treated prior to arrival.  Will get remaining labs. Of note he is having a headache, but does not have any red flag symptoms and has had similar headaches in the past, but typically does not last this long.     CoVID test    Dispo pending results    EKG Interpretation    Interpreted by me    Rhythm: normal sinus   Rate: normal  Axis: normal  Ectopy: none  Conduction: normal  ST Segments: no acute change  T Waves: no acute change  Q Waves: none    Clinical Impression: normal EKG     CRITICAL CARE: None     Pa Gonzales MD  Attending Emergency Physician   t      Pa Gonzales MD  01/22/21 1901

## 2021-01-22 NOTE — PROGRESS NOTES
[x] Verified order. [x] Obtained Home Pulse-Oximeter from [facility]. [x] Demonstrated use of Home Pulse-Oximeter. [x] Provided written discharge instruction for Home Pulse-Oximeter. [x] Patient/family verbalized understanding of discharge instructions including when to return immediately to the emergency department.

## 2021-01-22 NOTE — ED PROVIDER NOTES
[address] [facility]  FACULTY HANDOFF       Handoff taken on the following patient from prior Attending Physician:  Pt Name: Fay Johnson  PCP:  Dorita Osuna MD    Attestation  I was available and discussed any additional care issues that arose and coordinated the management plans with the resident(s) caring for the patient during my duty period. Any areas of disagreement with resident's documentation of care or procedures are noted on the chart. I was personally present for the key portions of any/all procedures during my duty period. I have documented in the chart those procedures where I was not present during the key portions.          CHIEF COMPLAINT       Chief Complaint   Patient presents with    Chest Pain    Blood Sugar Problem         CURRENT MEDICATIONS     Previous Medications  Current Discharge Medication List      CONTINUE these medications which have NOT CHANGED    Details   insulin glargine (BASAGLAR KWIKPEN) 100 UNIT/ML injection pen Inject 25 Units into the skin nightly  Qty: 6 mL, Refills: 1      lisinopril (PRINIVIL;ZESTRIL) 2.5 MG tablet Take 1 tablet by mouth daily  Qty: 30 tablet, Refills: 3      folic acid (FOLVITE) 1 MG tablet Take 1 tablet by mouth daily  Qty: 30 tablet, Refills: 3      pantoprazole (PROTONIX) 40 MG tablet Take 1 tablet by mouth every morning (before breakfast)  Qty: 30 tablet, Refills: 3      vitamin B-1 100 MG tablet Take 1 tablet by mouth daily  Qty: 30 tablet, Refills: 3      Insulin Pen Needle ([employer] PEN NEEDLES) 31G X 6 MM MISC 1 each by Does not apply route daily  Qty: 100 each, Refills: 3      ibuprofen (ADVIL;MOTRIN) 800 MG tablet Take 1 tablet by mouth every 8 hours as needed for Pain  Qty: 20 tablet, Refills: 0      aspirin 81 MG chewable tablet Take 1 tablet by mouth daily  Qty: 30 tablet, Refills: 3      atorvastatin (LIPITOR) 20 MG tablet Take 1 tablet by mouth nightly  Qty: 30 tablet, Refills: 3      insulin aspart (NOVOLOG FLEXPEN) 100 UNIT/ML injection pen Inject 3 Units into the skin 3 times daily (before meals) Use as per the scale provided  Qty: 1 pen, Refills: 1      fluticasone-salmeterol (ADVAIR DISKUS) 250-50 MCG/DOSE AEPB Inhale 1 puff into the lungs every 12 hours. Qty: 60 each, Refills: 3    Associated Diagnoses: Chronic back pain; Muscle spasm; Asthma; Pain in both wrists; Carpal tunnel syndrome, bilateral      albuterol (PROVENTIL HFA) 108 (90 BASE) MCG/ACT inhaler Inhale 2 puffs into the lungs every 4 hours as needed for Wheezing. Qty: 1 Inhaler, Refills: 3    Associated Diagnoses: Chronic back pain; Muscle spasm; Asthma; Pain in both wrists; Carpal tunnel syndrome, bilateral             Encounter Medications  Orders Placed This Encounter   Medications    0.9 % sodium chloride bolus    prochlorperazine (COMPAZINE) injection 10 mg    diphenhydrAMINE (BENADRYL) injection 25 mg       ALLERGIES     is allergic to bee venom and penicillins. RECENT VITALS:   Temp: 98.5 °F (36.9 °C),  Pulse: 96, Resp: 28, BP: (!) 155/95    RADIOLOGY:   XR CHEST PORTABLE   Final Result   No acute cardiopulmonary abnormality.              LABS:  Labs Reviewed   CBC WITH AUTO DIFFERENTIAL - Abnormal; Notable for the following components:       Result Value    Immature Granulocytes 1 (*)     Seg Neutrophils 82 (*)     Lymphocytes 8 (*)     Absolute Lymph # 0.69 (*)     All other components within normal limits   BASIC METABOLIC PANEL W/ REFLEX TO MG FOR LOW K - Abnormal; Notable for the following components:    Glucose 378 (*)     CREATININE 0.69 (*)     Sodium 134 (*)     All other components within normal limits   URINALYSIS - Abnormal; Notable for the following components:    Glucose, Ur 3+ (*)     Specific Gravity, UA 1.034 (*)     All other components within normal limits   COVID-19 - Abnormal; Notable for the following components:    SARS-CoV-2, Rapid DETECTED (*)     All other components within normal limits   SODIUM (POC) - Abnormal; Notable

## 2021-01-22 NOTE — ED NOTES
D/C papers reviewed with the PT and girlfriend. Both stated understanding of D/c papers and the quarantine process. Work notes given to both. All belongings given to PT.        Gonzalez Clark RN  01/22/21 8640

## 2021-01-22 NOTE — LETTER
[facility]  [address] [facility] [address]  Phone: [phone]               January 22, 2021    Patient: Leonel Ring   YOB: 1990   Date of Visit: 1/22/2021       To Whom It May Concern: Leonel Ring was seen and treated in our emergency department on 1/22/2021. He has tested positive for COVID-19. He was escorted by his significant other Juancho Garcia. Both parties will need to quarantine for 14 days.       Sincerely,       Fox Nugent RN         Signature:__________________________________

## 2021-01-22 NOTE — ED PROVIDER NOTES
[facility] ED  Emergency Department Encounter  Emergency Medicine Resident     Pt Name: Jony Renteria  MRN: [id number]  [place] 1990  Date of evaluation: 1/22/21  PCP:  Hailey Schafer MD    [address] [place] [address]       Chief Complaint   Patient presents with    Chest Pain    Blood Sugar Problem       HISTORY OFPRESENT ILLNESS  (Location/Symptom, Timing/Onset, Context/Setting, Quality, Duration, Modifying Factors,Severity.)      Jony Renteria is a 31 y. o.yo male history of DKA who presents with elevated blood sugar of greater than 350 and chest pain. Patient states that the chest pain woke him out of his sleep located on the left side of his chest, with no radiation however with nausea and vomiting. Prior to arrival patient states that he used 16 units of his insulin. Patient states that he has been feeling weak all over his body. Also complains of 10 out of 10 headache. Maximal in onset, feels as though his head is throbbing. No photophobia no lacrimation no rhinorrhea. Patient states that he has taken ibuprofen for the headache without any relief. Denies any blurry vision, neck stiffness or neck pain, able to ambulate without any difficulty, intact sensation and no motor deficit. PAST MEDICAL / SURGICAL / SOCIAL / FAMILY HISTORY      has a past medical history of Asthma, Carpal tunnel syndrome, bilateral, and Chronic back pain. has a past surgical history that includes Neck surgery (N/A, year of 2010).      Social History     Socioeconomic History    Marital status: Single     Spouse name: Not on file    Number of children: Not on file    Years of education: Not on file    Highest education level: Not on file   Occupational History    Not on file   Social Needs    Financial resource strain: Not on file    Food insecurity     Worry: Not on file     Inability: Not on file    Transportation needs     Medical: Not on file     Non-medical: Not on file   Tobacco Use    Smoking status: Current Every Day Smoker     Packs/day: 1.50     Years: 4.00     Pack years: 6.00     Types: Cigarettes    Smokeless tobacco: Never Used   Substance and Sexual Activity    Alcohol use: Yes    Drug use: Yes     Types: Marijuana    Sexual activity: Yes     Partners: Female   Lifestyle    Physical activity     Days per week: Not on file     Minutes per session: Not on file    Stress: Not on file   Relationships    Social connections     Talks on phone: Not on file     Gets together: Not on file     Attends Yarsanism service: Not on file     Active member of club or organization: Not on file     Attends meetings of clubs or organizations: Not on file     Relationship status: Not on file    Intimate partner violence     Fear of current or ex partner: Not on file     Emotionally abused: Not on file     Physically abused: Not on file     Forced sexual activity: Not on file   Other Topics Concern    Not on file   Social History Narrative    Not on file       Family History   Problem Relation Age of Onset    Schizophrenia Mother     Glaucoma Mother     Asthma Mother         Allergies:  Bee venom and Penicillins    Home Medications:  Prior to Admission medications    Medication Sig Start Date End Date Taking?  Authorizing Provider   insulin glargine [facility]) 100 UNIT/ML injection pen Inject 25 Units into the skin nightly 11/4/20 12/22/20  Erickson Alaniz DO   lisinopril (PRINIVIL;ZESTRIL) 2.5 MG tablet Take 1 tablet by mouth daily 9/8/20   Jey Alfonso MD   folic acid (FOLVITE) 1 MG tablet Take 1 tablet by mouth daily 9/9/20   Jey Alfonso MD   pantoprazole (PROTONIX) 40 MG tablet Take 1 tablet by mouth every morning (before breakfast) 9/9/20   Jey Alfonso MD   vitamin B-1 100 MG tablet Take 1 tablet by mouth daily 9/9/20   Varun Meehan MD   Insulin Pen Needle ([employer] PEN NEEDLES) 31G X 6 MM MISC 1 each by Does not apply route daily 9/8/20 Eyal Goodman MD   ibuprofen (ADVIL;MOTRIN) 800 MG tablet Take 1 tablet by mouth every 8 hours as needed for Pain 6/27/19   SID Renteria - CNP   aspirin 81 MG chewable tablet Take 1 tablet by mouth daily 7/25/18   Matty Dunlap MD   atorvastatin (LIPITOR) 20 MG tablet Take 1 tablet by mouth nightly 7/25/18   Matty Dunlap MD   insulin aspart (NOVOLOG FLEXPEN) 100 UNIT/ML injection pen Inject 3 Units into the skin 3 times daily (before meals) Use as per the scale provided  Patient taking differently: Inject into the skin 3 times daily (before meals) Use as per the scale provided pt on sliding scale 7/25/18   Matty Dunlap MD   fluticasone-salmeterol (ADVAIR DISKUS) 250-50 MCG/DOSE AEPB Inhale 1 puff into the lungs every 12 hours. 3/21/13   Serenity Dumont MD   albuterol (PROVENTIL HFA) 108 (90 BASE) MCG/ACT inhaler Inhale 2 puffs into the lungs every 4 hours as needed for Wheezing. 3/21/13   Kartik Hill MD       REVIEW OFSYSTEMS    (2-9 systems for level 4, 10 or more for level 5)      Review of Systems   Constitutional: Positive for fatigue. Negative for fever. HENT: Negative for sore throat, tinnitus and trouble swallowing. Eyes: Negative for photophobia and visual disturbance. Respiratory: Negative for cough and shortness of breath. Cardiovascular: Positive for chest pain. Gastrointestinal: Positive for nausea and vomiting. Negative for abdominal pain. Genitourinary: Negative for flank pain, hematuria and urgency. Musculoskeletal: Positive for myalgias. Negative for gait problem and joint swelling. Skin: Negative for rash and wound. Neurological: Positive for weakness and headaches. Negative for numbness. Psychiatric/Behavioral: Negative for behavioral problems and confusion.        PHYSICAL EXAM   (up to 7 for level 4, 8 or more forlevel 5)      INITIAL VITALS:   ED Triage Vitals   BP Temp Temp Source Pulse Resp SpO2 Height Weight   01/22/21 0500 01/22/21 0459 01/22/21 0459 01/22/21 0500 01/22/21 0500 01/22/21 0500 01/22/21 0500 01/22/21 0500   (!) 155/95 97.3 °F (36.3 °C) Temporal 96 28 96 % 5' 9\" (1.753 m) 185 lb (83.9 kg)       Physical Exam  Constitutional:       General: He is in acute distress. Appearance: He is diaphoretic. He is not toxic-appearing. HENT:      Head: Normocephalic and atraumatic. Eyes:      Extraocular Movements: Extraocular movements intact. Pupils: Pupils are equal, round, and reactive to light. Neck:      Musculoskeletal: No neck rigidity or muscular tenderness. Cardiovascular:      Rate and Rhythm: Normal rate. Heart sounds: No murmur. Pulmonary:      Effort: Pulmonary effort is normal. No respiratory distress. Breath sounds: Normal breath sounds. Abdominal:      General: There is no distension. Palpations: Abdomen is soft. Tenderness: There is no abdominal tenderness. Musculoskeletal:         General: No swelling or tenderness. Neurological:      General: No focal deficit present. Mental Status: He is alert and oriented to person, place, and time. Motor: No weakness.       Gait: Gait normal.   Psychiatric:         Mood and Affect: Mood normal.         Behavior: Behavior normal.         DIFFERENTIAL  DIAGNOSIS     PLAN (LABS / IMAGING / EKG):  Orders Placed This Encounter   Procedures    XR CHEST PORTABLE    CBC Auto Differential    Basic Metabolic Panel w/ Reflex to MG    Beta-Hydroxybutyrate    Urinalysis, reflex to microscopic    Troponin    Troponin    Osmolality    COVID-19    Hemoglobin and hematocrit, blood    SODIUM (POC)    POTASSIUM (POC)    CHLORIDE (POC)    CALCIUM, IONIC (POC)    POC Blood Gas and Chemistry    POC Glucose Fingerstick    Venous Blood Gas, POC    Creatinine W/GFR Point of Care    Lactic Acid, POC    POCT Glucose    Anion Gap (Calc) POC    EKG 12 Lead    DME Order for Pulse Oximeter Device As OP       MEDICATIONS ORDERED:  Orders Placed This Encounter   Medications    0.9 % sodium chloride bolus    prochlorperazine (COMPAZINE) injection 10 mg    diphenhydrAMINE (BENADRYL) injection 25 mg       DDX: ACS, DKA, Pneumothorax, pericarditis, bacteria versus viral pneumonia, COVID, electrolyte imbalance     Initial MDM/Plan: 31 y.o. male who presents with chest pain and sugar of greater than 350. On presentation patient afebrile, slightly hypertensive, heart rate is 96, satting over 96% on room air. However patient is clammy, lung sounds unremarkable, heart regular rate rhythm,  No focal neuro deficits, pupils equal and reactive, no neck stiffness or pain. Plan for DKA work-up including point-of-care blood gas, beta hydroxybutyrate, electrolytes, CBC. Also obtain cardiac work-up including EKG/troponin x2 chest x-ray due to patient complaint of chest pain on presentation.     Patient given 1 L normal saline, 10 mg of Compazine and 25 mg of Benadryl for symptomatic treatment    Disposition pending initial labs and x-ray    DIAGNOSTIC RESULTS / EMERGENCYDEPARTMENT COURSE / MDM     LABS:  Labs Reviewed   CBC WITH AUTO DIFFERENTIAL - Abnormal; Notable for the following components:       Result Value    Immature Granulocytes 1 (*)     Seg Neutrophils 82 (*)     Lymphocytes 8 (*)     Absolute Lymph # 0.69 (*)     All other components within normal limits   BASIC METABOLIC PANEL W/ REFLEX TO MG FOR LOW K - Abnormal; Notable for the following components:    Glucose 378 (*)     CREATININE 0.69 (*)     Sodium 134 (*)     All other components within normal limits   URINALYSIS - Abnormal; Notable for the following components:    Glucose, Ur 3+ (*)     Specific Gravity, UA 1.034 (*)     All other components within normal limits   COVID-19 - Abnormal; Notable for the following components:    SARS-CoV-2, Rapid DETECTED (*)     All other components within normal limits   SODIUM (POC) - Abnormal; Notable for the following components:    POC Sodium 136 (*)     All other components within normal limits   CALCIUM, IONIC (POC) - Abnormal; Notable for the following components:    POC Ionized Calcium 1.14 (*)     All other components within normal limits   POC GLUCOSE FINGERSTICK - Abnormal; Notable for the following components:    POC Glucose 341 (*)     All other components within normal limits   VENOUS BLOOD GAS, POINT OF CARE - Abnormal; Notable for the following components:    pCO2, Demarco 35.2 (*)     pO2, Demarco 71.7 (*)     O2 Sat, Demarco 95 (*)     All other components within normal limits   LACTIC ACID,POINT OF CARE - Abnormal; Notable for the following components:    POC Lactic Acid 3.23 (*)     All other components within normal limits   POCT GLUCOSE - Abnormal; Notable for the following components:    POC Glucose 373 (*)     All other components within normal limits   BETA-HYDROXYBUTYRATE   TROPONIN   TROPONIN   HGB/HCT   POTASSIUM (POC)   CHLORIDE (POC)   OSMOLALITY   POC BLOOD GAS AND CHEMISTRY   CREATININE W/GFR POINT OF CARE   ANION GAP (CALC) POC         RADIOLOGY:  Xr Chest Portable    Result Date: 1/22/2021  EXAMINATION: ONE XRAY VIEW OF THE CHEST 1/22/2021 5:21 am COMPARISON: 09/07/2020 HISTORY: ORDERING SYSTEM PROVIDED HISTORY: chest pain TECHNOLOGIST PROVIDED HISTORY: chest pain Reason for Exam: Upright, chest pain FINDINGS: The cardiac silhouette appears within normal limits for size given portable technique. No convincing evidence of a focal consolidation. No pleural effusion or pneumothorax seen. No acute cardiopulmonary abnormality.          EKG  EKG Interpretation    Interpreted by me    Rhythm: normal sinus   Rate: normal  Axis: normal  Ectopy: none  Conduction: normal  ST Segments: no acute change  T Waves: no acute change  Q Waves: none    Clinical Impression: no acute changes and normal EKG    All EKG's are interpreted by the Emergency Department Physicianwho either signs or Co-signs this chart in the absence of a cardiologist.    Stiven NIELSEN Payne 89

## 2021-01-23 ENCOUNTER — CARE COORDINATION (OUTPATIENT)
Dept: CARE COORDINATION | Age: 31
End: 2021-01-23

## 2021-01-23 NOTE — CARE COORDINATION
Discussed with patient Pulse Ox readings states they have been good all in normal range %, pt is currently cleaning with significant other and did agree to LOOP enrollment for further assessment. Pt could not stay on the phone long as they \"were busy right now\". Patient contacted regarding [id number] diagnosis\". Discussed COVID-19 related testing which was available at this time. Test results were positive. Patient informed of results, if available? Yes    Care Transition Nurse/ Ambulatory Care Manager contacted the patient by telephone to perform post discharge assessment. Call within 2 business days of discharge: Yes. Verified name and  with patient as identifiers. Provided introduction to self, and explanation of the CTN/ACM role, and reason for call due to risk factors for infection and/or exposure to COVID-19. Symptoms reviewed with patient who verbalized the following symptoms: fatigue, diarrhea, no new symptoms and no worsening symptoms. Due to no new or worsening symptoms encounter was not routed to provider for escalation. Discussed follow-up appointments. If no appointment was previously scheduled, appointment scheduling offered: Yes  [address] follow up appointment(s): No future appointments. Non-[facility] follow up appointment(s):  Pt will call monday    Non-face-to-face services provided:  Obtained and reviewed discharge summary and/or continuity of care documents  Education of patient/family/caregiver/guardian to support self-management-gf educated on [address] [place]:   Does patient have an Advance Directive:  reviewed and current. Patient has following risk factors of: asthma and diabetes. CTN/ACM reviewed discharge instructions, medical action plan and red flags such as increased shortness of breath, increasing fever and signs of decompensation with patient who verbalized understanding.    Discussed exposure protocols and quarantine with CDC Guidelines What to

## 2021-01-29 LAB
EKG ATRIAL RATE: 99 BPM
EKG P AXIS: 75 DEGREES
EKG P-R INTERVAL: 138 MS
EKG Q-T INTERVAL: 346 MS
EKG QRS DURATION: 92 MS
EKG QTC CALCULATION (BAZETT): 444 MS
EKG R AXIS: 16 DEGREES
EKG T AXIS: 23 DEGREES
EKG VENTRICULAR RATE: 99 BPM

## 2021-02-22 NOTE — PROGRESS NOTES
Referral received for DM Ed. Pt currently sleeping- unable to wake. Patient presented to the emergency department with a chief complaint of nausea, vomiting, increased urination, dry mouth, headache, abdominal pain that started today while at work. He was concerned for DKA secondary to his type 1 diabetes. He has been out of his long-acting insulin, Lantus for approximately 1 month. He also stated he was running low on his short acting insulin, Humalog, as of last night. He states he ran out of his prescriptions because he lost the prescription and could not find it. He sees Endocrinologist, Dr. Leni Cazares @ the [facility]. Pt has MMO and [facility]. Meds to beds would be great so pt has meds and supplies he needs before leaving the hospital.  Will try to fu prior to D/C for education needs and if pt D/C'ed please order OP DM ed.  Marcia Fernandez, ADRYANE operating room

## 2021-03-19 ENCOUNTER — HOSPITAL ENCOUNTER (OUTPATIENT)
Age: 31
Setting detail: OBSERVATION
Discharge: LEFT BEFORE DISCHARGE | DRG: 639 | End: 2021-03-20
Attending: EMERGENCY MEDICINE | Admitting: INTERNAL MEDICINE
Payer: COMMERCIAL

## 2021-03-19 ENCOUNTER — APPOINTMENT (OUTPATIENT)
Dept: GENERAL RADIOLOGY | Age: 31
DRG: 639 | End: 2021-03-19
Payer: COMMERCIAL

## 2021-03-19 DIAGNOSIS — R79.89 ELEVATED BLOOD KETONE BODY LEVEL: ICD-10-CM

## 2021-03-19 DIAGNOSIS — R73.9 HYPERGLYCEMIA: Primary | ICD-10-CM

## 2021-03-19 LAB
ABSOLUTE EOS #: 0.15 K/UL (ref 0–0.44)
ABSOLUTE IMMATURE GRANULOCYTE: 0.05 K/UL (ref 0–0.3)
ABSOLUTE LYMPH #: 2.62 K/UL (ref 1.1–3.7)
ABSOLUTE MONO #: 0.88 K/UL (ref 0.1–1.2)
ALBUMIN SERPL-MCNC: 4.1 G/DL (ref 3.5–5.2)
ALBUMIN/GLOBULIN RATIO: 1.8 (ref 1–2.5)
ALLEN TEST: ABNORMAL
ALLEN TEST: ABNORMAL
ALP BLD-CCNC: 125 U/L (ref 40–129)
ALT SERPL-CCNC: 31 U/L (ref 5–41)
ANION GAP SERPL CALCULATED.3IONS-SCNC: 18 MMOL/L (ref 9–17)
ANION GAP SERPL CALCULATED.3IONS-SCNC: 25 MMOL/L (ref 9–17)
ANION GAP: 14 MMOL/L (ref 7–16)
AST SERPL-CCNC: 28 U/L
BASOPHILS # BLD: 1 % (ref 0–2)
BASOPHILS ABSOLUTE: 0.06 K/UL (ref 0–0.2)
BETA-HYDROXYBUTYRATE: 6.04 MMOL/L (ref 0.02–0.27)
BILIRUB SERPL-MCNC: 0.97 MG/DL (ref 0.3–1.2)
BILIRUBIN URINE: NEGATIVE
BUN BLDV-MCNC: 15 MG/DL (ref 6–20)
BUN BLDV-MCNC: 16 MG/DL (ref 6–20)
BUN/CREAT BLD: ABNORMAL (ref 9–20)
BUN/CREAT BLD: ABNORMAL (ref 9–20)
CALCIUM SERPL-MCNC: 8.1 MG/DL (ref 8.6–10.4)
CALCIUM SERPL-MCNC: 8.4 MG/DL (ref 8.6–10.4)
CARBOXYHEMOGLOBIN: 4.3 % (ref 0–5)
CHLORIDE BLD-SCNC: 93 MMOL/L (ref 98–107)
CHLORIDE BLD-SCNC: 99 MMOL/L (ref 98–107)
CO2: 14 MMOL/L (ref 20–31)
CO2: 15 MMOL/L (ref 20–31)
COLOR: YELLOW
COMMENT UA: ABNORMAL
CREAT SERPL-MCNC: 0.74 MG/DL (ref 0.7–1.2)
CREAT SERPL-MCNC: 0.89 MG/DL (ref 0.7–1.2)
DIFFERENTIAL TYPE: ABNORMAL
EOSINOPHILS RELATIVE PERCENT: 1 % (ref 1–4)
FIO2: ABNORMAL
FIO2: ABNORMAL
GFR AFRICAN AMERICAN: >60 ML/MIN
GFR AFRICAN AMERICAN: >60 ML/MIN
GFR NON-AFRICAN AMERICAN: >60 ML/MIN
GFR SERPL CREATININE-BSD FRML MDRD: >60 ML/MIN
GFR SERPL CREATININE-BSD FRML MDRD: ABNORMAL ML/MIN/{1.73_M2}
GFR SERPL CREATININE-BSD FRML MDRD: NORMAL ML/MIN/{1.73_M2}
GLUCOSE BLD-MCNC: 353 MG/DL (ref 70–99)
GLUCOSE BLD-MCNC: 359 MG/DL (ref 75–110)
GLUCOSE BLD-MCNC: 518 MG/DL (ref 70–99)
GLUCOSE BLD-MCNC: 599 MG/DL (ref 74–100)
GLUCOSE URINE: ABNORMAL
HCO3 VENOUS: 14.2 MMOL/L (ref 24–30)
HCO3 VENOUS: 16 MMOL/L (ref 22–29)
HCT VFR BLD CALC: 42 % (ref 40.7–50.3)
HEMOGLOBIN: 13.9 G/DL (ref 13–17)
IMMATURE GRANULOCYTES: 1 %
KETONES, URINE: ABNORMAL
LACTIC ACID, WHOLE BLOOD: 1.9 MMOL/L (ref 0.7–2.1)
LACTIC ACID: NORMAL MMOL/L
LEUKOCYTE ESTERASE, URINE: NEGATIVE
LIPASE: 25 U/L (ref 13–60)
LYMPHOCYTES # BLD: 25 % (ref 24–43)
MCH RBC QN AUTO: 30.7 PG (ref 25.2–33.5)
MCHC RBC AUTO-ENTMCNC: 33.1 G/DL (ref 28.4–34.8)
MCV RBC AUTO: 92.7 FL (ref 82.6–102.9)
METHEMOGLOBIN: ABNORMAL % (ref 0–1.5)
MODE: ABNORMAL
MODE: ABNORMAL
MONOCYTES # BLD: 8 % (ref 3–12)
MYOGLOBIN: 28 NG/ML (ref 28–72)
NEGATIVE BASE EXCESS, VEN: 7 (ref 0–2)
NEGATIVE BASE EXCESS, VEN: 9.2 MMOL/L (ref 0–2)
NITRITE, URINE: NEGATIVE
NOTIFICATION TIME: ABNORMAL
NOTIFICATION: ABNORMAL
NRBC AUTOMATED: 0 PER 100 WBC
O2 DEVICE/FLOW/%: ABNORMAL
O2 DEVICE/FLOW/%: ABNORMAL
O2 SAT, VEN: 97 % (ref 60–85)
O2 SAT, VEN: 99.2 % (ref 60–85)
OXYHEMOGLOBIN: ABNORMAL % (ref 95–98)
PATIENT TEMP: 37
PATIENT TEMP: ABNORMAL
PCO2, VEN, TEMP ADJ: ABNORMAL MMHG (ref 39–55)
PCO2, VEN: 25.5 (ref 39–55)
PCO2, VEN: 25.8 MM HG (ref 41–51)
PDW BLD-RTO: 12.8 % (ref 11.8–14.4)
PEEP/CPAP: ABNORMAL
PH UA: 5.5 (ref 5–8)
PH VENOUS: 7.37 (ref 7.32–7.42)
PH VENOUS: 7.4 (ref 7.32–7.43)
PH, VEN, TEMP ADJ: ABNORMAL (ref 7.32–7.42)
PLATELET # BLD: 244 K/UL (ref 138–453)
PLATELET ESTIMATE: ABNORMAL
PMV BLD AUTO: 10.9 FL (ref 8.1–13.5)
PO2, VEN, TEMP ADJ: ABNORMAL MMHG (ref 30–50)
PO2, VEN: 154 (ref 30–50)
PO2, VEN: 85.8 MM HG (ref 30–50)
POC CHLORIDE: 102 MMOL/L (ref 98–107)
POC CREATININE: 0.66 MG/DL (ref 0.51–1.19)
POC HEMATOCRIT: 42 % (ref 41–53)
POC HEMOGLOBIN: 14.4 G/DL (ref 13.5–17.5)
POC IONIZED CALCIUM: 0.98 MMOL/L (ref 1.15–1.33)
POC LACTIC ACID: 1.48 MMOL/L (ref 0.56–1.39)
POC PCO2 TEMP: ABNORMAL MM HG
POC PH TEMP: ABNORMAL
POC PO2 TEMP: ABNORMAL MM HG
POC POTASSIUM: 4.2 MMOL/L (ref 3.5–4.5)
POC SODIUM: 132 MMOL/L (ref 138–146)
POSITIVE BASE EXCESS, VEN: ABNORMAL (ref 0–3)
POSITIVE BASE EXCESS, VEN: ABNORMAL MMOL/L (ref 0–2)
POTASSIUM SERPL-SCNC: 4.3 MMOL/L (ref 3.7–5.3)
POTASSIUM SERPL-SCNC: 4.4 MMOL/L (ref 3.7–5.3)
PROTEIN UA: NEGATIVE
PSV: ABNORMAL
PT. POSITION: ABNORMAL
RBC # BLD: 4.53 M/UL (ref 4.21–5.77)
RBC # BLD: ABNORMAL 10*6/UL
RESPIRATORY RATE: ABNORMAL
SAMPLE SITE: ABNORMAL
SAMPLE SITE: ABNORMAL
SARS-COV-2, RAPID: DETECTED
SEG NEUTROPHILS: 64 % (ref 36–65)
SEGMENTED NEUTROPHILS ABSOLUTE COUNT: 6.93 K/UL (ref 1.5–8.1)
SET RATE: ABNORMAL
SODIUM BLD-SCNC: 132 MMOL/L (ref 135–144)
SODIUM BLD-SCNC: 132 MMOL/L (ref 135–144)
SPECIFIC GRAVITY UA: 1.03 (ref 1–1.03)
SPECIMEN DESCRIPTION: ABNORMAL
TEXT FOR RESPIRATORY: ABNORMAL
TOTAL CK: 107 U/L (ref 39–308)
TOTAL CO2, VENOUS: 17 MMOL/L (ref 23–30)
TOTAL HB: ABNORMAL G/DL (ref 12–16)
TOTAL PROTEIN: 6.4 G/DL (ref 6.4–8.3)
TOTAL RATE: ABNORMAL
TROPONIN INTERP: NORMAL
TROPONIN T: NORMAL NG/ML
TROPONIN, HIGH SENSITIVITY: <6 NG/L (ref 0–22)
TURBIDITY: CLEAR
URINE HGB: NEGATIVE
UROBILINOGEN, URINE: NORMAL
VT: ABNORMAL
WBC # BLD: 10.7 K/UL (ref 3.5–11.3)
WBC # BLD: ABNORMAL 10*3/UL

## 2021-03-19 PROCEDURE — 96374 THER/PROPH/DIAG INJ IV PUSH: CPT

## 2021-03-19 PROCEDURE — 82330 ASSAY OF CALCIUM: CPT

## 2021-03-19 PROCEDURE — 82550 ASSAY OF CK (CPK): CPT

## 2021-03-19 PROCEDURE — 83690 ASSAY OF LIPASE: CPT

## 2021-03-19 PROCEDURE — 93005 ELECTROCARDIOGRAM TRACING: CPT | Performed by: EMERGENCY MEDICINE

## 2021-03-19 PROCEDURE — 82805 BLOOD GASES W/O2 SATURATION: CPT

## 2021-03-19 PROCEDURE — 36415 COLL VENOUS BLD VENIPUNCTURE: CPT

## 2021-03-19 PROCEDURE — 85025 COMPLETE CBC W/AUTO DIFF WBC: CPT

## 2021-03-19 PROCEDURE — 96372 THER/PROPH/DIAG INJ SC/IM: CPT

## 2021-03-19 PROCEDURE — 84132 ASSAY OF SERUM POTASSIUM: CPT

## 2021-03-19 PROCEDURE — 81003 URINALYSIS AUTO W/O SCOPE: CPT

## 2021-03-19 PROCEDURE — 85014 HEMATOCRIT: CPT

## 2021-03-19 PROCEDURE — 83605 ASSAY OF LACTIC ACID: CPT

## 2021-03-19 PROCEDURE — 82010 KETONE BODYS QUAN: CPT

## 2021-03-19 PROCEDURE — 83874 ASSAY OF MYOGLOBIN: CPT

## 2021-03-19 PROCEDURE — 71046 X-RAY EXAM CHEST 2 VIEWS: CPT

## 2021-03-19 PROCEDURE — 96361 HYDRATE IV INFUSION ADD-ON: CPT

## 2021-03-19 PROCEDURE — 82947 ASSAY GLUCOSE BLOOD QUANT: CPT

## 2021-03-19 PROCEDURE — 84484 ASSAY OF TROPONIN QUANT: CPT

## 2021-03-19 PROCEDURE — 2580000003 HC RX 258: Performed by: EMERGENCY MEDICINE

## 2021-03-19 PROCEDURE — 82565 ASSAY OF CREATININE: CPT

## 2021-03-19 PROCEDURE — 80053 COMPREHEN METABOLIC PANEL: CPT

## 2021-03-19 PROCEDURE — 6360000002 HC RX W HCPCS: Performed by: EMERGENCY MEDICINE

## 2021-03-19 PROCEDURE — 82435 ASSAY OF BLOOD CHLORIDE: CPT

## 2021-03-19 PROCEDURE — 84295 ASSAY OF SERUM SODIUM: CPT

## 2021-03-19 PROCEDURE — 96375 TX/PRO/DX INJ NEW DRUG ADDON: CPT

## 2021-03-19 PROCEDURE — 6370000000 HC RX 637 (ALT 250 FOR IP): Performed by: EMERGENCY MEDICINE

## 2021-03-19 PROCEDURE — U0002 COVID-19 LAB TEST NON-CDC: HCPCS

## 2021-03-19 PROCEDURE — 99285 EMERGENCY DEPT VISIT HI MDM: CPT

## 2021-03-19 PROCEDURE — 82803 BLOOD GASES ANY COMBINATION: CPT

## 2021-03-19 PROCEDURE — G0378 HOSPITAL OBSERVATION PER HR: HCPCS

## 2021-03-19 PROCEDURE — 80048 BASIC METABOLIC PNL TOTAL CA: CPT

## 2021-03-19 RX ORDER — ONDANSETRON 2 MG/ML
4 INJECTION INTRAMUSCULAR; INTRAVENOUS ONCE
Status: COMPLETED | OUTPATIENT
Start: 2021-03-19 | End: 2021-03-19

## 2021-03-19 RX ORDER — MORPHINE SULFATE 4 MG/ML
4 INJECTION, SOLUTION INTRAMUSCULAR; INTRAVENOUS ONCE
Status: COMPLETED | OUTPATIENT
Start: 2021-03-19 | End: 2021-03-19

## 2021-03-19 RX ORDER — 0.9 % SODIUM CHLORIDE 0.9 %
2500 INTRAVENOUS SOLUTION INTRAVENOUS ONCE
Status: COMPLETED | OUTPATIENT
Start: 2021-03-19 | End: 2021-03-19

## 2021-03-19 RX ORDER — FENTANYL CITRATE 50 UG/ML
50 INJECTION, SOLUTION INTRAMUSCULAR; INTRAVENOUS ONCE
Status: COMPLETED | OUTPATIENT
Start: 2021-03-19 | End: 2021-03-19

## 2021-03-19 RX ORDER — ACETAMINOPHEN 500 MG
1000 TABLET ORAL ONCE
Status: COMPLETED | OUTPATIENT
Start: 2021-03-19 | End: 2021-03-19

## 2021-03-19 RX ADMIN — INSULIN LISPRO 10 UNITS: 100 INJECTION, SOLUTION INTRAVENOUS; SUBCUTANEOUS at 19:39

## 2021-03-19 RX ADMIN — ACETAMINOPHEN 1000 MG: 500 TABLET ORAL at 19:05

## 2021-03-19 RX ADMIN — FENTANYL CITRATE 50 MCG: 50 INJECTION, SOLUTION INTRAMUSCULAR; INTRAVENOUS at 19:05

## 2021-03-19 RX ADMIN — SODIUM CHLORIDE 2500 ML: 9 INJECTION, SOLUTION INTRAVENOUS at 19:04

## 2021-03-19 RX ADMIN — ONDANSETRON 4 MG: 2 INJECTION INTRAMUSCULAR; INTRAVENOUS at 19:05

## 2021-03-19 RX ADMIN — MORPHINE SULFATE 4 MG: 4 INJECTION INTRAVENOUS at 19:28

## 2021-03-19 ASSESSMENT — PAIN DESCRIPTION - LOCATION: LOCATION: ABDOMEN

## 2021-03-19 ASSESSMENT — ENCOUNTER SYMPTOMS
DIARRHEA: 0
CONSTIPATION: 0
NAUSEA: 1
VOMITING: 1
SORE THROAT: 0
ABDOMINAL PAIN: 1
SHORTNESS OF BREATH: 0

## 2021-03-19 ASSESSMENT — PAIN DESCRIPTION - PAIN TYPE: TYPE: ACUTE PAIN

## 2021-03-19 ASSESSMENT — PAIN SCALES - GENERAL
PAINLEVEL_OUTOF10: 3
PAINLEVEL_OUTOF10: 10

## 2021-03-19 NOTE — ED PROVIDER NOTES
[facility]     Emergency Department     Faculty Attestation    I performed a history and physical examination of the patient and discussed management with the resident. I reviewed the residents note and agree with the documented findings and plan of care. Any areas of disagreement are noted on the chart. I was personally present for the key portions of any procedures. I have documented in the chart those procedures where I was not present during the key portions. I have reviewed the emergency nurses triage note. I agree with the chief complaint, past medical history, past surgical history, allergies, medications, social and family history as documented unless otherwise noted below. For Physician Assistant/ Nurse Practitioner cases/documentation I have personally evaluated this patient and have completed at least one if not all key elements of the E/M (history, physical exam, and MDM). Additional findings are as noted. I have personally seen and evaluated the patient. I find the patient's history and physical exam are consistent with the NP/PA documentation. I agree with the care provided, treatment rendered, disposition and follow-up plan. Diffuse myalgias shoulders arms back and neck no fever mentating clearly denies abdominal pain history of diabetes blood sugar elevated. Point-of-care pH 7.4      Critical Care     Nighat Low M.D.   Attending Emergency  Physician              Axel Biggs MD  03/19/21 [id number]

## 2021-03-19 NOTE — ED NOTES
Patient presents to ED for evaluation of hyperglycemia. Patient reports generalized body aches worsening over the past few days. Patient states he may have missed a couple doses of his insulin - just started a new job recently and has been busy. Patient reports increased thirst and increased urination. Patient denies fever, chills, cough, chest pain, shortness of breath, dysuria. BS 500s for EMS. EMS placed 16g IV in left AC and started 0.9% NS bolus.      Deborah Mcwilliams RN  03/19/21 [id number]

## 2021-03-19 NOTE — ED NOTES
[facility], [facility]  03/19/21 [id number]

## 2021-03-19 NOTE — ED NOTES
Bed: 08  Expected date: 3/19/21  Expected time: 6:09 PM  Means of arrival: [facility]  Comments:  LS Via Dominick Ortega, RN  03/19/21 Mauro Ch

## 2021-03-19 NOTE — ED PROVIDER NOTES
[facility] ED  Emergency Department Encounter  EmergencyMedicine Resident     Pt John Nova  MRN: [id number]  [place] 1990  Date of evaluation: 3/19/21  PCP:  Amber Durán MD    [address] [place] [address]       Chief Complaint   Patient presents with    Hyperglycemia       HISTORY OF PRESENT ILLNESS  (Location/Symptom, Timing/Onset, Context/Setting, Quality, Duration, Modifying Factors, Severity.)      Luca Ramirez is a 27 y.o. male who presents to the emergency department with a 3-day history of diffuse myalgias including chest and abdominal pain with one episode of vomiting today described as \"spit up\", and significant nausea. Patient has had DKA in the past with his history of type 1 diabetes and believes that he may be in DKA again today. He took his fingerstick glucose at home this morning and found to be 350 so he administered 12 units of short acting insulin to himself. States he did not eat anything today and his mouth feels \"dry\". EMS on arrival noted a blood sugar of 505. He was started on IV normal saline and brought to the emergency department. Denies fever, problems with urination or bowel movements, or new numbness or tingling anywhere. No recent steroid use and no substance use. He takes 20 units of nightly Lantus and was compliant with that. He believes that he may have missed a few doses of his insulin recently due to work. PAST MEDICAL / SURGICAL / SOCIAL / FAMILY HISTORY      has a past medical history of Asthma, Carpal tunnel syndrome, bilateral, and Chronic back pain. has a past surgical history that includes Neck surgery (N/A, year of 2010).       Social History     Socioeconomic History    Marital status: Single     Spouse name: Not on file    Number of children: Not on file    Years of education: Not on file    Highest education level: Not on file   Occupational History    Not on file   Social Needs    Financial resource strain: Not on file  Food insecurity     Worry: Not on file     Inability: Not on file    Transportation needs     Medical: Not on file     Non-medical: Not on file   Tobacco Use    Smoking status: Current Every Day Smoker     Packs/day: 1.00     Years: 4.00     Pack years: 4.00     Types: Cigarettes    Smokeless tobacco: Never Used   Substance and Sexual Activity    Alcohol use: Yes    Drug use: Yes     Types: Marijuana    Sexual activity: Yes     Partners: Female   Lifestyle    Physical activity     Days per week: Not on file     Minutes per session: Not on file    Stress: Not on file   Relationships    Social connections     Talks on phone: Not on file     Gets together: Not on file     Attends Congregational service: Not on file     Active member of club or organization: Not on file     Attends meetings of clubs or organizations: Not on file     Relationship status: Not on file    Intimate partner violence     Fear of current or ex partner: Not on file     Emotionally abused: Not on file     Physically abused: Not on file     Forced sexual activity: Not on file   Other Topics Concern    Not on file   Social History Narrative    Not on file       Family History   Problem Relation Age of Onset    Schizophrenia Mother     Glaucoma Mother     Asthma Mother        Allergies:  Bee venom and Penicillins    Home Medications:  Prior to Admission medications    Medication Sig Start Date End Date Taking?  Authorizing Provider   insulin glargine [facility]) 100 UNIT/ML injection pen Inject 25 Units into the skin nightly 11/4/20 12/22/20  Michelle Mccray DO   lisinopril (PRINIVIL;ZESTRIL) 2.5 MG tablet Take 1 tablet by mouth daily 9/8/20   Nelly Alfonso MD   folic acid (FOLVITE) 1 MG tablet Take 1 tablet by mouth daily 9/9/20   Nelly Alfonso MD   pantoprazole (PROTONIX) 40 MG tablet Take 1 tablet by mouth every morning (before breakfast) 9/9/20   Nelly Alfonso MD   vitamin B-1 100 MG tablet Take 1 tablet by mouth daily 9/9/20   Shayy Alfonso MD   Insulin Pen Needle ([employer] PEN NEEDLES) 31G X 6 MM MISC 1 each by Does not apply route daily 9/8/20   Shayy Alfonso MD   ibuprofen (ADVIL;MOTRIN) 800 MG tablet Take 1 tablet by mouth every 8 hours as needed for Pain 6/27/19   Benjamín Zarco, APRN - CNP   aspirin 81 MG chewable tablet Take 1 tablet by mouth daily 7/25/18   Rene Caballero MD   atorvastatin (LIPITOR) 20 MG tablet Take 1 tablet by mouth nightly 7/25/18   Rene Caballero MD   insulin aspart (NOVOLOG FLEXPEN) 100 UNIT/ML injection pen Inject 3 Units into the skin 3 times daily (before meals) Use as per the scale provided  Patient taking differently: Inject into the skin 3 times daily (before meals) Use as per the scale provided pt on sliding scale 7/25/18   Rene Caballero MD   fluticasone-salmeterol (ADVAIR DISKUS) 250-50 MCG/DOSE AEPB Inhale 1 puff into the lungs every 12 hours. 3/21/13   Desirae Buck MD   albuterol (PROVENTIL HFA) 108 (90 BASE) MCG/ACT inhaler Inhale 2 puffs into the lungs every 4 hours as needed for Wheezing. 3/21/13   Daja Morris MD       REVIEW OF SYSTEMS    (2-9 systems for level 4, 10 or more for level 5)      Review of Systems   Constitutional: Positive for chills and fatigue. Negative for fever. HENT: Negative for ear pain, hearing loss and sore throat. Eyes: Negative for visual disturbance. Respiratory: Negative for shortness of breath. Cardiovascular: Positive for chest pain. Gastrointestinal: Positive for abdominal pain, nausea and vomiting. Negative for constipation and diarrhea. Genitourinary: Negative for difficulty urinating and dysuria. Musculoskeletal: Positive for arthralgias and myalgias. Neurological: Negative for weakness and numbness. Psychiatric/Behavioral: Negative for agitation and confusion.        PHYSICAL EXAM   (up to 7 for level 4, 8 or more for level 5)      INITIAL VITALS:   /71   Pulse 90   Temp 98.1 °F (36.7 °C) (Oral)   Resp 14   Ht 5' 10\" (1.778 m)   Wt 185 lb (83.9 kg)   SpO2 96%   BMI 26.54 kg/m²     Physical Exam  Vitals signs and nursing note reviewed. Constitutional:       General: He is in acute distress. Appearance: Normal appearance. He is well-developed. He is ill-appearing. He is not diaphoretic. HENT:      Head: Normocephalic and atraumatic. Right Ear: External ear normal.      Left Ear: External ear normal.      Nose: Nose normal.      Mouth/Throat:      Mouth: Mucous membranes are moist.   Eyes:      Extraocular Movements: Extraocular movements intact. Conjunctiva/sclera: Conjunctivae normal.   Neck:      Musculoskeletal: Normal range of motion and neck supple. Trachea: No tracheal deviation. Cardiovascular:      Rate and Rhythm: Regular rhythm. Tachycardia present. Heart sounds: Normal heart sounds. No murmur. No friction rub. No gallop. Pulmonary:      Effort: Pulmonary effort is normal. No respiratory distress. Breath sounds: Wheezing (Scattered) present. No rhonchi or rales. Abdominal:      General: Abdomen is flat. There is no distension. Palpations: There is no mass. Tenderness: There is abdominal tenderness (Diffuse mild along with muscle aches). There is no guarding or rebound. Musculoskeletal: Normal range of motion. General: Tenderness (Diffuse muscle aches and tenderness) present. No swelling, deformity or signs of injury. Skin:     General: Skin is warm and dry. Capillary Refill: Capillary refill takes less than 2 seconds. Coloration: Skin is pale. Skin is not jaundiced. Findings: No bruising or lesion. Neurological:      General: No focal deficit present. Mental Status: He is alert and oriented to person, place, and time. Mental status is at baseline. Motor: No abnormal muscle tone.          DIFFERENTIAL  DIAGNOSIS     PLAN (LABS / IMAGING / EKG):  Orders Placed This Encounter   Procedures  COVID-19, Rapid    XR CHEST (2 VW)    Hemoglobin and hematocrit, blood    SODIUM (POC)    POTASSIUM (POC)    CHLORIDE (POC)    CALCIUM, IONIC (POC)    CBC Auto Differential    Comprehensive Metabolic Panel w/ Reflex to MG    Lipase    Troponin    BLOOD GAS, VENOUS    Lactic Acid, Plasma    BETA-HYDROXYBUTYRATE    CK    MYOGLOBIN, SERUM    Urinalysis Reflex to Culture    BASIC METABOLIC PANEL    Misc nursing order (specify)    Venous Blood Gas, POC    Creatinine W/GFR Point of Care    Lactic Acid, POC    POCT Glucose    Anion Gap (Calc) POC    POC Glucose Fingerstick    EKG 12 Lead    Insert peripheral IV    PATIENT STATUS (FROM ED OR OR/PROCEDURAL) Observation       MEDICATIONS ORDERED:  Orders Placed This Encounter   Medications    0.9 % sodium chloride bolus    acetaminophen (TYLENOL) tablet 1,000 mg    ondansetron (ZOFRAN) injection 4 mg    fentaNYL (SUBLIMAZE) injection 50 mcg    morphine injection 4 mg    insulin lispro (HUMALOG) injection vial 10 Units       DDX: Elzbieta Salazar is a 27 y.o. male who presents to the emergency department with diffuse muscle pains, body aches, and hyperglycemia for 3 days. Differential diagnosis includes DKA, hyperglycemia, rhabdomyolysis, substance abuse, sepsis    DIAGNOSTIC RESULTS / EMERGENCY DEPARTMENT COURSE / MDM   LAB RESULTS:  Results for orders placed or performed during the hospital encounter of 03/19/21   COVID-19, Rapid    Specimen: Nasopharyngeal Swab   Result Value Ref Range    Specimen Description . NASOPHARYNGEAL SWAB     SARS-CoV-2, Rapid DETECTED (A) Not Detected   Hemoglobin and hematocrit, blood   Result Value Ref Range    POC Hemoglobin 14.4 13.5 - 17.5 g/dL    POC Hematocrit 42 41 - 53 %   SODIUM (POC)   Result Value Ref Range    POC Sodium 132 (L) 138 - 146 mmol/L   POTASSIUM (POC)   Result Value Ref Range    POC Potassium 4.2 3.5 - 4.5 mmol/L   CHLORIDE (POC)   Result Value Ref Range    POC Chloride 102 98 - 107 mmol/L   CALCIUM, IONIC (POC)   Result Value Ref Range    POC Ionized Calcium 0.98 (L) 1.15 - 1.33 mmol/L   CBC Auto Differential   Result Value Ref Range    WBC 10.7 3.5 - 11.3 k/uL    RBC 4.53 4.21 - 5.77 m/uL    Hemoglobin 13.9 13.0 - 17.0 g/dL    Hematocrit 42.0 40.7 - 50.3 %    MCV 92.7 82.6 - 102.9 fL    MCH 30.7 25.2 - 33.5 pg    MCHC 33.1 28.4 - 34.8 g/dL    RDW 12.8 11.8 - 14.4 %    Platelets 790 533 - 603 k/uL    MPV 10.9 8.1 - 13.5 fL    NRBC Automated 0.0 0.0 per 100 WBC    Differential Type NOT REPORTED     Seg Neutrophils 64 36 - 65 %    Lymphocytes 25 24 - 43 %    Monocytes 8 3 - 12 %    Eosinophils % 1 1 - 4 %    Basophils 1 0 - 2 %    Immature Granulocytes 1 (H) 0 %    Segs Absolute 6.93 1.50 - 8.10 k/uL    Absolute Lymph # 2.62 1.10 - 3.70 k/uL    Absolute Mono # 0.88 0.10 - 1.20 k/uL    Absolute Eos # 0.15 0.00 - 0.44 k/uL    Basophils Absolute 0.06 0.00 - 0.20 k/uL    Absolute Immature Granulocyte 0.05 0.00 - 0.30 k/uL    WBC Morphology NOT REPORTED     RBC Morphology NOT REPORTED     Platelet Estimate NOT REPORTED    Comprehensive Metabolic Panel w/ Reflex to MG   Result Value Ref Range    Glucose 518 (HH) 70 - 99 mg/dL    BUN 15 6 - 20 mg/dL    CREATININE 0.74 0.70 - 1.20 mg/dL    Bun/Cre Ratio NOT REPORTED 9 - 20    Calcium 8.4 (L) 8.6 - 10.4 mg/dL    Sodium 132 (L) 135 - 144 mmol/L    Potassium 4.4 3.7 - 5.3 mmol/L    Chloride 93 (L) 98 - 107 mmol/L    CO2 14 (L) 20 - 31 mmol/L    Anion Gap 25 (H) 9 - 17 mmol/L    Alkaline Phosphatase 125 40 - 129 U/L    ALT 31 5 - 41 U/L    AST 28 <40 U/L    Total Bilirubin 0.97 0.3 - 1.2 mg/dL    Total Protein 6.4 6.4 - 8.3 g/dL    Albumin 4.1 3.5 - 5.2 g/dL    Albumin/Globulin Ratio 1.8 1.0 - 2.5    GFR Non-African American >60 >60 mL/min    GFR African American >60 >60 mL/min    GFR Comment          GFR Staging NOT REPORTED    Lipase   Result Value Ref Range    Lipase 25 13 - 60 U/L   Troponin   Result Value Ref Range    Troponin, High Sensitivity <6 0 - 22 ng/L    Troponin T NOT REPORTED <0.03 ng/mL    Troponin Interp NOT REPORTED    BLOOD GAS, VENOUS   Result Value Ref Range    pH, Demarco 7.365 7.320 - 7.420    pCO2, Demarco 25.5 (L) 39 - 55    pO2, Edmarco 154.0 (H) 30 - 50    HCO3, Venous 14.2 (L) 24 - 30 mmol/L    Positive Base Excess, Demarco NOT REPORTED 0.0 - 2.0 mmol/L    Negative Base Excess, Demarco 9.2 (H) 0.0 - 2.0 mmol/L    O2 Sat, Demarco 99.2 (H) 60.0 - 85.0 %    Total Hb NOT REPORTED 12.0 - 16.0 g/dl    Oxyhemoglobin NOT REPORTED 95.0 - 98.0 %    Carboxyhemoglobin 4.3 0 - 5 %    Methemoglobin NOT REPORTED 0.0 - 1.5 %    Pt Temp 37.0     pH, Demarco, Temp Adj NOT REPORTED 7.320 - 7.420    pCO2, Demarco, Temp Adj NOT REPORTED 39 - 55 mmHg    pO2, Demarco, Temp Adj NOT REPORTED 30 - 50 mmHg    O2 Device/Flow/% NOT REPORTED     Respiratory Rate NOT REPORTED     Kayode Test NOT REPORTED     Sample Site NOT REPORTED     Pt.  Position NOT REPORTED     Mode NOT REPORTED     Set Rate NOT REPORTED     Total Rate NOT REPORTED     VT NOT REPORTED     FIO2 INFORMATION NOT PROVIDED     Peep/Cpap NOT REPORTED     PSV NOT REPORTED     Text for Respiratory NOT REPORTED     NOTIFICATION NOT REPORTED     NOTIFICATION TIME NOT REPORTED    Lactic Acid, Plasma   Result Value Ref Range    Lactic Acid NOT REPORTED mmol/L    Lactic Acid, Whole Blood 1.9 0.7 - 2.1 mmol/L   BETA-HYDROXYBUTYRATE   Result Value Ref Range    Beta-Hydroxybutyrate 6.04 (H) 0.02 - 0.27 mmol/L   CK   Result Value Ref Range    Total  39 - 308 U/L   MYOGLOBIN, SERUM   Result Value Ref Range    Myoglobin 28 28 - 72 ng/mL   Urinalysis Reflex to Culture    Specimen: Urine, clean catch   Result Value Ref Range    Color, UA YELLOW YELLOW    Turbidity UA CLEAR CLEAR    Glucose, Ur 3+ (A) NEGATIVE    Bilirubin Urine NEGATIVE NEGATIVE    Ketones, Urine LARGE (A) NEGATIVE    Specific Gravity, UA 1.033 (H) 1.005 - 1.030    Urine Hgb NEGATIVE NEGATIVE    pH, UA 5.5 5.0 - 8.0    Protein, UA NEGATIVE NEGATIVE    Urobilinogen, Urine Normal Normal    Nitrite, Urine NEGATIVE NEGATIVE    Leukocyte Esterase, Urine NEGATIVE NEGATIVE    Urinalysis Comments       Microscopic exam not performed based on chemical results unless requested in original order.    BASIC METABOLIC PANEL   Result Value Ref Range    Glucose 353 (H) 70 - 99 mg/dL    BUN 16 6 - 20 mg/dL    CREATININE 0.89 0.70 - 1.20 mg/dL    Bun/Cre Ratio NOT REPORTED 9 - 20    Calcium 8.1 (L) 8.6 - 10.4 mg/dL    Sodium 132 (L) 135 - 144 mmol/L    Potassium 4.3 3.7 - 5.3 mmol/L    Chloride 99 98 - 107 mmol/L    CO2 15 (L) 20 - 31 mmol/L    Anion Gap 18 (H) 9 - 17 mmol/L    GFR Non-African American >60 >60 mL/min    GFR African American >60 >60 mL/min    GFR Comment          GFR Staging NOT REPORTED    Venous Blood Gas, POC   Result Value Ref Range    pH, Demarco 7.401 7.320 - 7.430    pCO2, Demarco 25.8 (L) 41.0 - 51.0 mm Hg    pO2, Demarco 85.8 (H) 30 - 50 mm Hg    HCO3, Venous 16.0 (L) 22.0 - 29.0 mmol/L    Total CO2, Venous 17 (L) 23.0 - 30.0 mmol/L    Negative Base Excess, Demarco 7 (H) 0.0 - 2.0    Positive Base Excess, Demarco NOT REPORTED 0.0 - 3.0    O2 Sat, Demarco 97 (H) 60.0 - 85.0 %    O2 Device/Flow/% NOT REPORTED     Kayode Test NOT REPORTED     Sample Site NOT REPORTED     Mode NOT REPORTED     FIO2 NOT REPORTED     Pt Temp NOT REPORTED     POC pH Temp NOT REPORTED     POC pCO2 Temp NOT REPORTED mm Hg    POC pO2 Temp NOT REPORTED mm Hg   Creatinine W/GFR Point of Care   Result Value Ref Range    POC Creatinine 0.66 0.51 - 1.19 mg/dL    GFR Comment >60 >60 mL/min    GFR Non-African American >60 >60 mL/min    GFR Comment         Lactic Acid, POC   Result Value Ref Range    POC Lactic Acid 1.48 (H) 0.56 - 1.39 mmol/L   POCT Glucose   Result Value Ref Range    POC Glucose 599 (HH) 74 - 100 mg/dL   Anion Gap (Calc) POC   Result Value Ref Range    Anion Gap 14 7 - 16 mmol/L   POC Glucose Fingerstick   Result Value Ref Range    POC Glucose 359 (H) 75 - 110 mg/dL       IMPRESSION: Caffie Stakes is a 27 y.o. male who presents to the emergency department with diffuse muscle aches and hyperglycemia for 3 days. On examination he is afebrile, vital signs demonstrate tachycardia and examination demonstrates a very uncomfortable appearing male with pain over every muscle group when palpated. Rhabdomyolysis versus DKA is on the differential, will obtain comprehensive work-up and treat with fluids initially. RADIOLOGY:  No results found. EKG  EKG Interpretation    Interpreted by emergency department physician    Rhythm: normal sinus   Rate: normal  Axis: normal  Ectopy: none  Conduction: FL interval 142, QRS 86, QTc 49  ST Segments: no acute change  T Waves: no acute change  Q Waves: none    Clinical Impression: no acute changes and normal EKG compared to prior EKG from January 22, 2021    Carlos A De Artem [address] José Miguel Esquivel MD    All EKG's are interpreted by the Emergency Department Physician who either signs or co-signs this chart in the absence of a cardiologist.    EMERGENCY DEPARTMENT COURSE:  ED Course as of Mar 19 2258   Fri Mar 19, 2021   2211 Patient resting comfortably denies concerns at this time. Labs are consistent with significant hyperglycemia necessitating admission but    [TS]   2234 Note patient has a positive COVID-19 test here but he was positive in January as well so my assessment is that the patient is SIRD negative especially since he has not had any recent upper respiratory symptoms, worsening shortness of breath, fever or chills, or any other concerns. [TS]      ED Course User Index  [TS] Sameer Jones MD       PROCEDURES:  None    CONSULTS:  None    CRITICAL CARE:  Please see attending note. FINAL IMPRESSION      1. Hyperglycemia    2. Elevated blood ketone body level        DISPOSITION / PLAN     DISPOSITION Admitted 03/19/2021 10:10:37 PM      PATIENT REFERRED TO:  No follow-up provider specified.     DISCHARGE MEDICATIONS:  New Prescriptions    No medications on file       Sameer Jones MD  Emergency Medicine Resident    This patient was evaluated in the Emergency Department for symptoms described in the history of present illness. He/she was evaluated in the context of the global COVID-19 pandemic, which necessitated consideration that the patient might be at risk for infection with the SARS-CoV-2 virus that causes COVID-19. Institutional protocols and algorithms that pertain to the evaluation of patients at risk for COVID-19 are in a state of rapid change based on information released by regulatory bodies including the CDC and federal and state organizations. These policies and algorithms were followed during the patient's care in the ED.     (Please note that portions of thisnote were completed with a voice recognition program.  Efforts were made to edit the dictations but occasionally words are mis-transcribed.)        Shae Gonzales MD  Resident  03/19/21 0070

## 2021-03-19 NOTE — ED NOTES
The following labs labeled with pt sticker and tubed:     [x] Lavender   [] on ice   [x] Blue   [x] Green/yellow  [x] Green/black [x] on ice  [] Pink  [] Red  [] Yellow     [] COVID-19 swab    [] Rapid     [x] Urine Sample  [] Pelvic Cultures    [] Blood Cultures      [facility], [address] [place] [address]  03/19/21 7069

## 2021-03-20 VITALS
OXYGEN SATURATION: 98 % | RESPIRATION RATE: 18 BRPM | TEMPERATURE: 97 F | BODY MASS INDEX: 26.48 KG/M2 | SYSTOLIC BLOOD PRESSURE: 124 MMHG | WEIGHT: 185 LBS | DIASTOLIC BLOOD PRESSURE: 61 MMHG | HEART RATE: 79 BPM | HEIGHT: 70 IN

## 2021-03-20 PROBLEM — Z86.16 HISTORY OF 2019 NOVEL CORONAVIRUS DISEASE (COVID-19): Status: ACTIVE | Noted: 2021-03-20

## 2021-03-20 LAB
ANION GAP SERPL CALCULATED.3IONS-SCNC: 22 MMOL/L (ref 9–17)
BUN BLDV-MCNC: 13 MG/DL (ref 6–20)
BUN/CREAT BLD: ABNORMAL (ref 9–20)
CALCIUM SERPL-MCNC: 7.6 MG/DL (ref 8.6–10.4)
CHLORIDE BLD-SCNC: 98 MMOL/L (ref 98–107)
CO2: 13 MMOL/L (ref 20–31)
CREAT SERPL-MCNC: 0.75 MG/DL (ref 0.7–1.2)
EKG ATRIAL RATE: 110 BPM
EKG P AXIS: 76 DEGREES
EKG P-R INTERVAL: 142 MS
EKG Q-T INTERVAL: 362 MS
EKG QRS DURATION: 86 MS
EKG QTC CALCULATION (BAZETT): 489 MS
EKG R AXIS: 60 DEGREES
EKG T AXIS: 55 DEGREES
EKG VENTRICULAR RATE: 110 BPM
GFR AFRICAN AMERICAN: >60 ML/MIN
GFR NON-AFRICAN AMERICAN: >60 ML/MIN
GFR SERPL CREATININE-BSD FRML MDRD: ABNORMAL ML/MIN/{1.73_M2}
GFR SERPL CREATININE-BSD FRML MDRD: ABNORMAL ML/MIN/{1.73_M2}
GLUCOSE BLD-MCNC: 275 MG/DL (ref 75–110)
GLUCOSE BLD-MCNC: 284 MG/DL (ref 75–110)
GLUCOSE BLD-MCNC: 384 MG/DL (ref 70–99)
POTASSIUM SERPL-SCNC: 4.7 MMOL/L (ref 3.7–5.3)
SODIUM BLD-SCNC: 133 MMOL/L (ref 135–144)

## 2021-03-20 PROCEDURE — 93010 ELECTROCARDIOGRAM REPORT: CPT | Performed by: INTERNAL MEDICINE

## 2021-03-20 PROCEDURE — 2060000000 HC ICU INTERMEDIATE R&B

## 2021-03-20 PROCEDURE — 2580000003 HC RX 258: Performed by: EMERGENCY MEDICINE

## 2021-03-20 PROCEDURE — 6370000000 HC RX 637 (ALT 250 FOR IP): Performed by: EMERGENCY MEDICINE

## 2021-03-20 PROCEDURE — 36415 COLL VENOUS BLD VENIPUNCTURE: CPT

## 2021-03-20 PROCEDURE — 80048 BASIC METABOLIC PNL TOTAL CA: CPT

## 2021-03-20 PROCEDURE — G0378 HOSPITAL OBSERVATION PER HR: HCPCS

## 2021-03-20 PROCEDURE — 82947 ASSAY GLUCOSE BLOOD QUANT: CPT

## 2021-03-20 RX ORDER — DEXTROSE MONOHYDRATE 25 G/50ML
12.5 INJECTION, SOLUTION INTRAVENOUS PRN
Status: DISCONTINUED | OUTPATIENT
Start: 2021-03-20 | End: 2021-03-20 | Stop reason: HOSPADM

## 2021-03-20 RX ORDER — ONDANSETRON 4 MG/1
4 TABLET, ORALLY DISINTEGRATING ORAL EVERY 8 HOURS PRN
Status: DISCONTINUED | OUTPATIENT
Start: 2021-03-20 | End: 2021-03-20 | Stop reason: HOSPADM

## 2021-03-20 RX ORDER — MAGNESIUM SULFATE 1 G/100ML
1000 INJECTION INTRAVENOUS PRN
Status: DISCONTINUED | OUTPATIENT
Start: 2021-03-20 | End: 2021-03-20 | Stop reason: HOSPADM

## 2021-03-20 RX ORDER — SODIUM CHLORIDE 450 MG/100ML
INJECTION, SOLUTION INTRAVENOUS CONTINUOUS
Status: DISCONTINUED | OUTPATIENT
Start: 2021-03-20 | End: 2021-03-20 | Stop reason: HOSPADM

## 2021-03-20 RX ORDER — DEXTROSE, SODIUM CHLORIDE, AND POTASSIUM CHLORIDE 5; .45; .15 G/100ML; G/100ML; G/100ML
INJECTION INTRAVENOUS CONTINUOUS PRN
Status: DISCONTINUED | OUTPATIENT
Start: 2021-03-20 | End: 2021-03-20 | Stop reason: HOSPADM

## 2021-03-20 RX ORDER — ALBUTEROL SULFATE 2.5 MG/3ML
2.5 SOLUTION RESPIRATORY (INHALATION) EVERY 4 HOURS PRN
Status: DISCONTINUED | OUTPATIENT
Start: 2021-03-20 | End: 2021-03-20 | Stop reason: HOSPADM

## 2021-03-20 RX ORDER — SODIUM CHLORIDE 0.9 % (FLUSH) 0.9 %
10 SYRINGE (ML) INJECTION EVERY 12 HOURS SCHEDULED
Status: DISCONTINUED | OUTPATIENT
Start: 2021-03-20 | End: 2021-03-20 | Stop reason: HOSPADM

## 2021-03-20 RX ORDER — BUDESONIDE AND FORMOTEROL FUMARATE DIHYDRATE 160; 4.5 UG/1; UG/1
2 AEROSOL RESPIRATORY (INHALATION) 2 TIMES DAILY
Status: DISCONTINUED | OUTPATIENT
Start: 2021-03-20 | End: 2021-03-20 | Stop reason: HOSPADM

## 2021-03-20 RX ORDER — ATORVASTATIN CALCIUM 20 MG/1
20 TABLET, FILM COATED ORAL NIGHTLY
Status: DISCONTINUED | OUTPATIENT
Start: 2021-03-20 | End: 2021-03-20 | Stop reason: HOSPADM

## 2021-03-20 RX ORDER — SODIUM CHLORIDE 9 MG/ML
INJECTION, SOLUTION INTRAVENOUS CONTINUOUS
Status: DISCONTINUED | OUTPATIENT
Start: 2021-03-20 | End: 2021-03-20

## 2021-03-20 RX ORDER — ACETAMINOPHEN 325 MG/1
650 TABLET ORAL EVERY 4 HOURS PRN
Status: DISCONTINUED | OUTPATIENT
Start: 2021-03-20 | End: 2021-03-20 | Stop reason: HOSPADM

## 2021-03-20 RX ORDER — LISINOPRIL 2.5 MG/1
2.5 TABLET ORAL DAILY
Status: DISCONTINUED | OUTPATIENT
Start: 2021-03-20 | End: 2021-03-20 | Stop reason: HOSPADM

## 2021-03-20 RX ORDER — NICOTINE POLACRILEX 4 MG
15 LOZENGE BUCCAL PRN
Status: DISCONTINUED | OUTPATIENT
Start: 2021-03-20 | End: 2021-03-20 | Stop reason: HOSPADM

## 2021-03-20 RX ORDER — POTASSIUM CHLORIDE 7.45 MG/ML
10 INJECTION INTRAVENOUS PRN
Status: DISCONTINUED | OUTPATIENT
Start: 2021-03-20 | End: 2021-03-20 | Stop reason: HOSPADM

## 2021-03-20 RX ORDER — LANOLIN ALCOHOL/MO/W.PET/CERES
100 CREAM (GRAM) TOPICAL DAILY
Status: DISCONTINUED | OUTPATIENT
Start: 2021-03-20 | End: 2021-03-20 | Stop reason: HOSPADM

## 2021-03-20 RX ORDER — ASPIRIN 81 MG/1
81 TABLET, CHEWABLE ORAL DAILY
Status: DISCONTINUED | OUTPATIENT
Start: 2021-03-20 | End: 2021-03-20 | Stop reason: HOSPADM

## 2021-03-20 RX ORDER — SODIUM CHLORIDE 0.9 % (FLUSH) 0.9 %
10 SYRINGE (ML) INJECTION PRN
Status: DISCONTINUED | OUTPATIENT
Start: 2021-03-20 | End: 2021-03-20 | Stop reason: HOSPADM

## 2021-03-20 RX ORDER — FOLIC ACID 1 MG/1
1 TABLET ORAL DAILY
Status: DISCONTINUED | OUTPATIENT
Start: 2021-03-20 | End: 2021-03-20 | Stop reason: HOSPADM

## 2021-03-20 RX ORDER — PANTOPRAZOLE SODIUM 40 MG/1
40 TABLET, DELAYED RELEASE ORAL
Status: DISCONTINUED | OUTPATIENT
Start: 2021-03-20 | End: 2021-03-20 | Stop reason: HOSPADM

## 2021-03-20 RX ORDER — DEXTROSE MONOHYDRATE 50 MG/ML
100 INJECTION, SOLUTION INTRAVENOUS PRN
Status: DISCONTINUED | OUTPATIENT
Start: 2021-03-20 | End: 2021-03-20 | Stop reason: HOSPADM

## 2021-03-20 RX ORDER — INSULIN GLARGINE 100 [IU]/ML
25 INJECTION, SOLUTION SUBCUTANEOUS NIGHTLY
Status: DISCONTINUED | OUTPATIENT
Start: 2021-03-20 | End: 2021-03-20 | Stop reason: HOSPADM

## 2021-03-20 RX ADMIN — INSULIN LISPRO 3 UNITS: 100 INJECTION, SOLUTION INTRAVENOUS; SUBCUTANEOUS at 00:41

## 2021-03-20 RX ADMIN — SODIUM CHLORIDE: 9 INJECTION, SOLUTION INTRAVENOUS at 00:21

## 2021-03-20 RX ADMIN — INSULIN GLARGINE 25 UNITS: 100 INJECTION, SOLUTION SUBCUTANEOUS at 00:41

## 2021-03-20 NOTE — PROGRESS NOTES
Patient is aware and agreeable for transfer, report was called to RN. Pt is concerned about NPO status. Physician notified and will discuss this shortly.

## 2021-03-20 NOTE — ED NOTES
Pt resting quietly in bed at this time with friend at bedside. Pt's pain reassessed. Pt appears to be more comfortable and denies any needs at this time. Will continue to monitor.       Linda Sepulveda RN  03/19/21 2025

## 2021-03-20 NOTE — ED NOTES
Pt report called to [facility] and pt ready for transport.       Florida Warner RN  03/19/21 3510

## 2021-03-20 NOTE — H&P
Brief IM Update: Kenan Horner is a 27 y.o. / male who presented to [facility] for evaluation of body aches and elevated blood glucose readings. He was initially admitted to the observation unit for further care. Beta hydroxy and glucoses noted to be elevated. Anion gap metabolic acidosis present. Our service was notified this morning that the patient tested positive for COVID-19 and would need to be transferred from the obs unit to the COVID unit. However, he has previous known COVID test in January of 2021. Upon review of labs, his acidosis appears to be worsening. CO2 downtrending on BMP. Patient was made NPO and transfer to stepdown unit for initiation of insulin infusion was made. Prior to transfer to stepdown unit, the patient was very concerned about his NPO status, as he was reportedly feeling improved. Nursing staff explained to the patient that he was in DKA and needed further treatment. I was notified that the patient wanted to leave [address] Nursing staff attempted to get patient to stay, however, he left within 30 minutes of my notification that he was wanting to leave AMA. Nursing staff explained that the patient could suffer significant and worsening health problems if he were to leave AMA. He reportedly understood this and has decided to leave AMA prior to my arrival to the unit 10 minutes later.     Enrique Baumgarten, [address]

## 2021-03-20 NOTE — DISCHARGE SUMMARY
[facility]  Office: Sajan Hough, DO, Debora Delgado DO, Lamont Darnell DO, [facility], DO, Khoa Maria MD, Naheed Richter MD, Cass Márquez MD, Breann Simon MD, Aruna Quiñones MD, Minerva Bourgeois MD, Jonelle Garza MD, Harini Brown MD, Donna Law MD, John Gonzalez DO, Heidy Lieberman MD, Camille Villa DO, Italo Matos MD,  Magda Guzman DO, Larry Feng MD, Justyna Garduno MD, [facility], [facility], [facility], CNP, [facility], Faviola Escamilla, CNS, Ha Rudolph, CNP, Ulisses Hurt, CNP, Lucie Mackay, CNP, Chirag Herzog, CNP, Rhonda Casiano, CNP, Alexis Vela PA-C, Robbie Mendez, [facility], Mellissa Haynes, CNP, Julio Polanco, CNP, Christie Mares, CNP, Leopold Saras, CNP, Vidya Casiano, CNP, Eitan Mcfarland, [address] [facility]    Discharge Summary     Patient ID: Estela Abraham  :  1990   MRN: [id number]     ACCOUNT:  [de-identified]   Patient's PCP: Caridad Bella MD  Admit Date: 3/19/2021   Discharge Date: 3/20/2021    Length of Stay: 1  Code Status:  Full Code  Admitting Physician: Taniya Krishna DO  Discharge Physician: Luis Antonio Askew DO     Active Discharge Diagnoses:     Hospital Problem Lists:  Principal Problem:    DKA, type 1, not at goal [facility])  Active Problems:    History of 2019 novel coronavirus disease (COVID-19)  Resolved Problems:    * No resolved hospital problems. *      Admission Condition:  fair     Discharged Condition: [address] Stay:     Hospital Course: Estela Abraham is a 27 y.o. / male who presented to [facility] for evaluation of body aches and elevated blood glucose readings. He was initially admitted to the observation unit for further care. Beta hydroxy and glucoses noted to be elevated.  Anion gap metabolic acidosis present.     Our service was notified this morning that the patient tested positive for COVID-19 and would need to be transferred from the obs unit to the COVID unit. However, he has previous known COVID test in January of 2021.      Upon review of labs, his acidosis appears to be worsening. CO2 downtrending on BMP. Patient was made NPO and transfer to stepdown unit for initiation of insulin infusion was made. Prior to transfer to stepdown unit, the patient was very concerned about his NPO status, as he was reportedly feeling improved. Nursing staff explained to the patient that he was in DKA and needed further treatment. I was notified that the patient wanted to leave [address] Nursing staff attempted to get patient to stay, however, he left within 30 minutes of my notification that he was wanting to leave AMA. Nursing staff explained that the patient could suffer significant and worsening health problems if he were to leave AMA.  He reportedly understood this and has decided to leave AMA prior to my arrival to the unit 10 minutes later.     Significant therapeutic interventions:  As above    Significant Diagnostic Studies:   Labs / Micro:  CBC:   Lab Results   Component Value Date    WBC 10.7 03/19/2021    RBC 4.53 03/19/2021    HGB 13.9 03/19/2021    HCT 42.0 03/19/2021    MCV 92.7 03/19/2021    MCH 30.7 03/19/2021    MCHC 33.1 03/19/2021    RDW 12.8 03/19/2021     03/19/2021     CMP:    Lab Results   Component Value Date    GLUCOSE 384 03/20/2021     03/20/2021    K 4.7 03/20/2021    CL 98 03/20/2021    CO2 13 03/20/2021    BUN 13 03/20/2021    CREATININE 0.75 03/20/2021    ANIONGAP 22 03/20/2021    ALKPHOS 125 03/19/2021    ALT 31 03/19/2021    AST 28 03/19/2021    BILITOT 0.97 03/19/2021    LABALBU 4.1 03/19/2021    ALBUMIN 1.8 03/19/2021    LABGLOM >60 03/20/2021    GFRAA >60 03/20/2021    GFR      03/20/2021    GFR NOT REPORTED 03/20/2021    PROT 6.4 03/19/2021    CALCIUM 7.6 03/20/2021        Radiology:  Xr Chest (2 Vw)    Result Date: 3/19/2021  No acute process. Stable compared to prior studies       Consultations:    Consults:     Final Specialist Recommendations/Findings:   IP CONSULT TO HOSPITALIST      The patient was seen and examined on day of discharge and this discharge summary is in conjunction with any daily progress note from day of discharge. Discharge plan:     Disposition: AMA      Time Spent on discharge is  31 mins in patient examination, evaluation, counseling as well as medication reconciliation, prescriptions for required medications, discharge plan and follow up. Electronically signed by   Marina Duff DO  3/20/2021  10:01 AM      Thank you Dr. Tono Hsu MD for the opportunity to be involved in this patient's care.

## 2021-03-20 NOTE — ED NOTES
POC blood glucose 359. Pt resting with eyes closed upon entering the room. Pt labs drawn labeled and sent to lab. Pt denies any needs at this time. Will continue to monitor.       Logan Barcenas RN  03/19/21 9202

## 2021-03-20 NOTE — ED NOTES
Pt positive for covid 19 per lab. Physician updated and pt reports he tested positive on 1-.       Logan Barcenas RN  03/19/21 0822

## 2021-03-20 NOTE — PROGRESS NOTES
Patient chose to sign out AMA he states \"he can manage his blood sugar at home with his own supplies\". Writer explained DKA status with patient, and he still insisted on leaving. Physician was notified via perfect serve, but patient did not want to wait to speak with him. IV removed, AMA form signed and witnessed. Patient left unit.

## 2021-04-04 ENCOUNTER — HOSPITAL ENCOUNTER (EMERGENCY)
Age: 31
Discharge: HOME | End: 2021-04-04
Attending: EMERGENCY MEDICINE
Payer: COMMERCIAL

## 2021-04-04 VITALS
TEMPERATURE: 96.6 F | OXYGEN SATURATION: 99 % | HEIGHT: 70 IN | RESPIRATION RATE: 16 BRPM | DIASTOLIC BLOOD PRESSURE: 97 MMHG | SYSTOLIC BLOOD PRESSURE: 146 MMHG | BODY MASS INDEX: 27.2 KG/M2 | WEIGHT: 190 LBS | HEART RATE: 98 BPM

## 2021-04-04 DIAGNOSIS — R73.9 HYPERGLYCEMIA: Primary | ICD-10-CM

## 2021-04-04 DIAGNOSIS — E11.9 DIABETES MELLITUS WITH NO COMPLICATION (HCC): ICD-10-CM

## 2021-04-04 DIAGNOSIS — E86.0 DEHYDRATION: ICD-10-CM

## 2021-04-04 LAB
ABSOLUTE EOS #: 0.4 K/UL (ref 0–0.44)
ABSOLUTE IMMATURE GRANULOCYTE: <0.03 K/UL (ref 0–0.3)
ABSOLUTE LYMPH #: 1.68 K/UL (ref 1.1–3.7)
ABSOLUTE MONO #: 0.42 K/UL (ref 0.1–1.2)
ALBUMIN SERPL-MCNC: 4.2 G/DL (ref 3.5–5.2)
ALBUMIN/GLOBULIN RATIO: 1.6 (ref 1–2.5)
ALLEN TEST: ABNORMAL
ALP BLD-CCNC: 140 U/L (ref 40–129)
ALT SERPL-CCNC: 49 U/L (ref 5–41)
ANION GAP SERPL CALCULATED.3IONS-SCNC: 18 MMOL/L (ref 9–17)
ANION GAP: 14 MMOL/L (ref 7–16)
AST SERPL-CCNC: 40 U/L
BASOPHILS # BLD: 1 % (ref 0–2)
BASOPHILS ABSOLUTE: 0.09 K/UL (ref 0–0.2)
BETA-HYDROXYBUTYRATE: 2.91 MMOL/L (ref 0.02–0.27)
BILIRUB SERPL-MCNC: 1.23 MG/DL (ref 0.3–1.2)
BUN BLDV-MCNC: 19 MG/DL (ref 6–20)
BUN/CREAT BLD: ABNORMAL (ref 9–20)
CALCIUM SERPL-MCNC: 8.8 MG/DL (ref 8.6–10.4)
CHLORIDE BLD-SCNC: 93 MMOL/L (ref 98–107)
CHP ED QC CHECK: NORMAL
CO2: 18 MMOL/L (ref 20–31)
CREAT SERPL-MCNC: 1.01 MG/DL (ref 0.7–1.2)
DIFFERENTIAL TYPE: ABNORMAL
EOSINOPHILS RELATIVE PERCENT: 5 % (ref 1–4)
FIO2: ABNORMAL
GFR AFRICAN AMERICAN: >60 ML/MIN
GFR NON-AFRICAN AMERICAN: >60 ML/MIN
GFR NON-AFRICAN AMERICAN: >60 ML/MIN
GFR SERPL CREATININE-BSD FRML MDRD: >60 ML/MIN
GFR SERPL CREATININE-BSD FRML MDRD: ABNORMAL ML/MIN/{1.73_M2}
GFR SERPL CREATININE-BSD FRML MDRD: ABNORMAL ML/MIN/{1.73_M2}
GFR SERPL CREATININE-BSD FRML MDRD: NORMAL ML/MIN/{1.73_M2}
GLUCOSE BLD-MCNC: 309 MG/DL (ref 75–110)
GLUCOSE BLD-MCNC: 587 MG/DL (ref 70–99)
GLUCOSE BLD-MCNC: 639 MG/DL
GLUCOSE BLD-MCNC: 639 MG/DL (ref 74–100)
HCO3 VENOUS: 21.4 MMOL/L (ref 22–29)
HCT VFR BLD CALC: 46 % (ref 40.7–50.3)
HEMOGLOBIN: 15.1 G/DL (ref 13–17)
IMMATURE GRANULOCYTES: 0 %
LIPASE: 47 U/L (ref 13–60)
LYMPHOCYTES # BLD: 21 % (ref 24–43)
MAGNESIUM: 1.6 MG/DL (ref 1.6–2.6)
MCH RBC QN AUTO: 30.4 PG (ref 25.2–33.5)
MCHC RBC AUTO-ENTMCNC: 32.8 G/DL (ref 28.4–34.8)
MCV RBC AUTO: 92.7 FL (ref 82.6–102.9)
MODE: ABNORMAL
MONOCYTES # BLD: 5 % (ref 3–12)
NEGATIVE BASE EXCESS, VEN: 4 (ref 0–2)
NRBC AUTOMATED: 0 PER 100 WBC
O2 DEVICE/FLOW/%: ABNORMAL
O2 SAT, VEN: 82 % (ref 60–85)
PATIENT TEMP: ABNORMAL
PCO2, VEN: 38.4 MM HG (ref 41–51)
PDW BLD-RTO: 12.7 % (ref 11.8–14.4)
PH VENOUS: 7.35 (ref 7.32–7.43)
PLATELET # BLD: 212 K/UL (ref 138–453)
PLATELET ESTIMATE: ABNORMAL
PMV BLD AUTO: 10.8 FL (ref 8.1–13.5)
PO2, VEN: 48.4 MM HG (ref 30–50)
POC CHLORIDE: 97 MMOL/L (ref 98–107)
POC CREATININE: 1.12 MG/DL (ref 0.51–1.19)
POC HEMATOCRIT: 50 % (ref 41–53)
POC HEMOGLOBIN: 17.2 G/DL (ref 13.5–17.5)
POC IONIZED CALCIUM: 1.18 MMOL/L (ref 1.15–1.33)
POC LACTIC ACID: 2.98 MMOL/L (ref 0.56–1.39)
POC PCO2 TEMP: ABNORMAL MM HG
POC PH TEMP: ABNORMAL
POC PO2 TEMP: ABNORMAL MM HG
POC POTASSIUM: 3.4 MMOL/L (ref 3.5–4.5)
POC SODIUM: 132 MMOL/L (ref 138–146)
POSITIVE BASE EXCESS, VEN: ABNORMAL (ref 0–3)
POTASSIUM SERPL-SCNC: 3.5 MMOL/L (ref 3.7–5.3)
RBC # BLD: 4.96 M/UL (ref 4.21–5.77)
RBC # BLD: ABNORMAL 10*6/UL
SAMPLE SITE: ABNORMAL
SEG NEUTROPHILS: 68 % (ref 36–65)
SEGMENTED NEUTROPHILS ABSOLUTE COUNT: 5.52 K/UL (ref 1.5–8.1)
SODIUM BLD-SCNC: 129 MMOL/L (ref 135–144)
TOTAL CO2, VENOUS: 23 MMOL/L (ref 23–30)
TOTAL PROTEIN: 6.8 G/DL (ref 6.4–8.3)
WBC # BLD: 8.1 K/UL (ref 3.5–11.3)
WBC # BLD: ABNORMAL 10*3/UL

## 2021-04-04 PROCEDURE — 82803 BLOOD GASES ANY COMBINATION: CPT

## 2021-04-04 PROCEDURE — 96374 THER/PROPH/DIAG INJ IV PUSH: CPT

## 2021-04-04 PROCEDURE — 82435 ASSAY OF BLOOD CHLORIDE: CPT

## 2021-04-04 PROCEDURE — 84132 ASSAY OF SERUM POTASSIUM: CPT

## 2021-04-04 PROCEDURE — 82565 ASSAY OF CREATININE: CPT

## 2021-04-04 PROCEDURE — 99282 EMERGENCY DEPT VISIT SF MDM: CPT

## 2021-04-04 PROCEDURE — 83735 ASSAY OF MAGNESIUM: CPT

## 2021-04-04 PROCEDURE — 83690 ASSAY OF LIPASE: CPT

## 2021-04-04 PROCEDURE — 84295 ASSAY OF SERUM SODIUM: CPT

## 2021-04-04 PROCEDURE — 82010 KETONE BODYS QUAN: CPT

## 2021-04-04 PROCEDURE — 2580000003 HC RX 258: Performed by: STUDENT IN AN ORGANIZED HEALTH CARE EDUCATION/TRAINING PROGRAM

## 2021-04-04 PROCEDURE — 85025 COMPLETE CBC W/AUTO DIFF WBC: CPT

## 2021-04-04 PROCEDURE — 80053 COMPREHEN METABOLIC PANEL: CPT

## 2021-04-04 PROCEDURE — 82330 ASSAY OF CALCIUM: CPT

## 2021-04-04 PROCEDURE — 83605 ASSAY OF LACTIC ACID: CPT

## 2021-04-04 PROCEDURE — 85014 HEMATOCRIT: CPT

## 2021-04-04 PROCEDURE — 96361 HYDRATE IV INFUSION ADD-ON: CPT

## 2021-04-04 PROCEDURE — 6360000002 HC RX W HCPCS: Performed by: STUDENT IN AN ORGANIZED HEALTH CARE EDUCATION/TRAINING PROGRAM

## 2021-04-04 PROCEDURE — 82947 ASSAY GLUCOSE BLOOD QUANT: CPT

## 2021-04-04 RX ORDER — 0.9 % SODIUM CHLORIDE 0.9 %
1000 INTRAVENOUS SOLUTION INTRAVENOUS ONCE
Status: COMPLETED | OUTPATIENT
Start: 2021-04-04 | End: 2021-04-04

## 2021-04-04 RX ORDER — ONDANSETRON 2 MG/ML
4 INJECTION INTRAMUSCULAR; INTRAVENOUS ONCE
Status: COMPLETED | OUTPATIENT
Start: 2021-04-04 | End: 2021-04-04

## 2021-04-04 RX ORDER — ONDANSETRON 2 MG/ML
4 INJECTION INTRAMUSCULAR; INTRAVENOUS ONCE
Status: DISCONTINUED | OUTPATIENT
Start: 2021-04-04 | End: 2021-04-04 | Stop reason: HOSPADM

## 2021-04-04 RX ADMIN — ONDANSETRON 4 MG: 2 INJECTION INTRAMUSCULAR; INTRAVENOUS at 12:56

## 2021-04-04 RX ADMIN — SODIUM CHLORIDE 1000 ML: 9 INJECTION, SOLUTION INTRAVENOUS at 12:10

## 2021-04-04 ASSESSMENT — ENCOUNTER SYMPTOMS
SORE THROAT: 0
COUGH: 0
DIARRHEA: 0
VOMITING: 1
ABDOMINAL PAIN: 0
PHOTOPHOBIA: 0
SHORTNESS OF BREATH: 0
CONSTIPATION: 0
NAUSEA: 1

## 2021-04-04 NOTE — ED PROVIDER NOTES
[facility] [address]  ED  Emergency Department  Senior Resident Attestation     Primary Care Physician  Amber Durán MD    I performed a history and physical examination of the patient and discussed management with the jatin resident. I reviewed the jatin residents note and agree with the documented findings and plan of care. Any areas of disagreement are noted on the chart. Case was then discussed with Faculty Attending Supervisor for additional medical management. PERTINENT ATTENDING PHYSICIAN COMMENTS:    HISTORY:   Luca Ramirez is a 27 y.o. male who  has a past medical history of Asthma, Carpal tunnel syndrome, bilateral (3/21/2013), and Chronic back pain (3/21/2013). and presents with complaint of hyperglycemia, vomiting. Patient states that he has been eating poorly but does state he is compliant with his medications. He did have an episode of emesis just prior to arrival and states now he feels better. He is adamant that he is not 1 to be admitted to the hospital, just wants to see if there is any \"ketones in his urine\" and then he knows what to do. Patient denies fever, dysuria, hematuria, abdominal pain, diarrhea, chest pain, shortness of breath, lightheadedness, dizziness.     PHYSICAL:   Temp: 96.6 °F (35.9 °C),  Pulse: 98, Resp: 16, BP: (!) 146/97, SpO2: 99 %  Gen: Non-toxic, Afebrile  Neck: Supple  Cards: Regular rate and rhythm  Pulm: Lung sounds clear to auscultation  Abdomen: Soft, non-tender, non-distended  Skin: warm, dry  Extremities: pulses 2+ radial / dorsalis pedis, no clubbing, cyanosis, edema    IMPRESSION:   Hyperglycemia, vomiting    PLAN:   IV fluids, DKA labs, urinalysis    CRITICAL CARE TIME:    None    Akshat Jeffrey DO  Senior Resident Physician    (Please note that portions of this note were completed with a voice recognition program.  Efforts were made to edit the dictations but occasionally words are mis-transcribed.)       Akshat Jeffrey DO  Resident  04/04/21 3274

## 2021-04-04 NOTE — ED PROVIDER NOTES
[facility]  ED  Emergency Department Encounter  EmergencyMedicine Resident     Pt Michelle Reyes  MRN: [id number]  [place] 1990  Date of evaluation: 4/4/21  PCP:  MD Albino Garcia       Chief Complaint   Patient presents with    Hyperglycemia     600 BS. pt complains of nausea and dehydration       HISTORY OF PRESENT ILLNESS  (Location/Symptom, Timing/Onset, Context/Setting, Quality, Duration, Modifying Factors, Severity.)      Jesús Goldberg is a 27 y.o. male who presents with Nausea and vomiting. Patient reports in the past few days he has had sniffing and stress at home with a recent break-up. Patient notes that 2 nights ago he was drinking alcohol but had nothing last night. In addition he has been eating more \"junk foods\" since then and late night snacking. Patient woke up this morning with nausea vomiting and checked his blood sugar. His glucometer read as \"high\". Patient took 15 units of his insulin and came to the ER for rehydration and evaluation. Patient reports that he is been compliant with his medications. Patient has had DKA in the past before, and \"I feel I am catching this early\". At this time patient denies any kind of fevers, chills, chest pain, shortness of breath, cough, dysuria, diarrhea/constipation, numbness or tingling, or rashes. PAST MEDICAL / SURGICAL / SOCIAL / FAMILY HISTORY      has a past medical history of Asthma, Carpal tunnel syndrome, bilateral, and Chronic back pain. has a past surgical history that includes Neck surgery (N/A, year of 2010).       Social History     Socioeconomic History    Marital status: Single     Spouse name: Not on file    Number of children: Not on file    Years of education: Not on file    Highest education level: Not on file   Occupational History    Not on file   Social Needs    Financial resource strain: Not on file    Food insecurity     Worry: Not on file     Inability: Not on file    Transportation needs     Medical: Not on file     Non-medical: Not on file   Tobacco Use    Smoking status: Current Every Day Smoker     Packs/day: 1.00     Years: 4.00     Pack years: 4.00     Types: Cigarettes    Smokeless tobacco: Never Used   Substance and Sexual Activity    Alcohol use: Yes    Drug use: Yes     Types: Marijuana    Sexual activity: Yes     Partners: Female   Lifestyle    Physical activity     Days per week: Not on file     Minutes per session: Not on file    Stress: Not on file   Relationships    Social connections     Talks on phone: Not on file     Gets together: Not on file     Attends Hoahaoism service: Not on file     Active member of club or organization: Not on file     Attends meetings of clubs or organizations: Not on file     Relationship status: Not on file    Intimate partner violence     Fear of current or ex partner: Not on file     Emotionally abused: Not on file     Physically abused: Not on file     Forced sexual activity: Not on file   Other Topics Concern    Not on file   Social History Narrative    Not on file       Family History   Problem Relation Age of Onset    Schizophrenia Mother     Glaucoma Mother     Asthma Mother        Allergies:  Bee venom and Penicillins    Home Medications:  Prior to Admission medications    Medication Sig Start Date End Date Taking?  Authorizing Provider   insulin glargine [facility]) 100 UNIT/ML injection pen Inject 25 Units into the skin nightly 11/4/20 3/20/21  Gloria Gill DO   lisinopril (PRINIVIL;ZESTRIL) 2.5 MG tablet Take 1 tablet by mouth daily 9/8/20   Ashok Alfonso MD   pantoprazole (PROTONIX) 40 MG tablet Take 1 tablet by mouth every morning (before breakfast) 9/9/20   Ashok Alfonso MD   vitamin B-1 100 MG tablet Take 1 tablet by mouth daily 9/9/20   Ashok Alfonso MD   Insulin Pen Needle ([employer] PEN NEEDLES) 31G X 6 MM MISC 1 each by Does not apply route daily 9/8/20 Jaylyn Spence MD   ibuprofen (ADVIL;MOTRIN) 800 MG tablet Take 1 tablet by mouth every 8 hours as needed for Pain 6/27/19   SID Perez - CNP   aspirin 81 MG chewable tablet Take 1 tablet by mouth daily 7/25/18   Linda Lynn MD   insulin aspart (NOVOLOG FLEXPEN) 100 UNIT/ML injection pen Inject 3 Units into the skin 3 times daily (before meals) Use as per the scale provided  Patient taking differently: Inject into the skin 3 times daily (before meals) Use as per the scale provided pt on sliding scale 7/25/18   Linda Lynn MD   albuterol (PROVENTIL HFA) 108 (90 BASE) MCG/ACT inhaler Inhale 2 puffs into the lungs every 4 hours as needed for Wheezing. 3/21/13   Jody Gillette MD       REVIEW OF SYSTEMS    (2-9 systems for level 4, 10 or more for level 5)      Review of Systems   Constitutional: Positive for fatigue. Negative for chills and fever. HENT: Negative for congestion and sore throat. Eyes: Negative for photophobia and visual disturbance. Respiratory: Negative for cough and shortness of breath. Cardiovascular: Negative for chest pain and palpitations. Gastrointestinal: Positive for nausea and vomiting. Negative for abdominal pain, constipation and diarrhea. Genitourinary: Negative for dysuria and hematuria. Musculoskeletal: Negative for arthralgias and myalgias. Skin: Negative for rash and wound. Neurological: Negative for weakness, light-headedness, numbness and headaches. PHYSICAL EXAM   (up to 7 for level 4, 8 or more for level 5)      INITIAL VITALS:   BP (!) 146/97   Pulse 98   Temp 96.6 °F (35.9 °C) (Temporal)   Resp 16   Ht 5' 10\" (1.778 m)   Wt 190 lb (86.2 kg)   SpO2 99%   BMI 27.26 kg/m²     Physical Exam  Vitals signs and nursing note reviewed. Constitutional:       General: He is not in acute distress. Appearance: Normal appearance. He is well-developed and normal weight. He is not toxic-appearing or diaphoretic.    HENT:      Head: Normocephalic and atraumatic. Right Ear: External ear normal.      Left Ear: External ear normal.      Nose: Nose normal.      Mouth/Throat:      Mouth: Mucous membranes are moist.   Eyes:      Extraocular Movements: Extraocular movements intact. Conjunctiva/sclera: Conjunctivae normal.      Pupils: Pupils are equal, round, and reactive to light. Neck:      Musculoskeletal: Normal range of motion and neck supple. No neck rigidity. Vascular: No JVD. Trachea: No tracheal deviation. Cardiovascular:      Rate and Rhythm: Normal rate and regular rhythm. Pulses: Normal pulses. Heart sounds: S1 normal and S2 normal.   Pulmonary:      Effort: Pulmonary effort is normal. No respiratory distress. Breath sounds: Normal breath sounds. Abdominal:      General: Abdomen is flat. There is no distension. Palpations: Abdomen is soft. Tenderness: There is no abdominal tenderness. There is no guarding or rebound. Musculoskeletal: Normal range of motion. General: No swelling or tenderness (Bilateral calves are nontender). Skin:     General: Skin is warm and dry. Capillary Refill: Capillary refill takes less than 2 seconds. Neurological:      General: No focal deficit present. Mental Status: He is alert and oriented to person, place, and time. Mental status is at baseline. Motor: No abnormal muscle tone.          DIFFERENTIAL  DIAGNOSIS     PLAN (LABS / IMAGING / EKG):  Orders Placed This Encounter   Procedures    Hemoglobin and hematocrit, blood    SODIUM (POC)    POTASSIUM (POC)    CHLORIDE (POC)    CALCIUM, IONIC (POC)    LIPASE    CBC Auto Differential    Comprehensive Metabolic Panel w/ Reflex to MG    Beta-Hydroxybutyrate    Magnesium    Venous Blood Gas, POC    Creatinine W/GFR Point of Care    Lactic Acid, POC    POCT Glucose    Anion Gap (Calc) POC    POC Blood Gas    POCT Glucose    POC Glucose Fingerstick    Insert peripheral IV MEDICATIONS ORDERED:  Orders Placed This Encounter   Medications    0.9 % sodium chloride bolus    ondansetron (ZOFRAN) injection 4 mg    DISCONTD: ondansetron (ZOFRAN) injection 4 mg       DDX: Gastritis, hyperglycemia, DKA, electrolyte abnormality, dehydration    DIAGNOSTIC RESULTS / EMERGENCY DEPARTMENT COURSE / MDM   LAB RESULTS:  Results for orders placed or performed during the hospital encounter of 04/04/21   Hemoglobin and hematocrit, blood   Result Value Ref Range    POC Hemoglobin 17.2 13.5 - 17.5 g/dL    POC Hematocrit 50 41 - 53 %   SODIUM (POC)   Result Value Ref Range    POC Sodium 132 (L) 138 - 146 mmol/L   POTASSIUM (POC)   Result Value Ref Range    POC Potassium 3.4 (L) 3.5 - 4.5 mmol/L   CHLORIDE (POC)   Result Value Ref Range    POC Chloride 97 (L) 98 - 107 mmol/L   CALCIUM, IONIC (POC)   Result Value Ref Range    POC Ionized Calcium 1.18 1.15 - 1.33 mmol/L   LIPASE   Result Value Ref Range    Lipase 47 13 - 60 U/L   CBC Auto Differential   Result Value Ref Range    WBC 8.1 3.5 - 11.3 k/uL    RBC 4.96 4.21 - 5.77 m/uL    Hemoglobin 15.1 13.0 - 17.0 g/dL    Hematocrit 46.0 40.7 - 50.3 %    MCV 92.7 82.6 - 102.9 fL    MCH 30.4 25.2 - 33.5 pg    MCHC 32.8 28.4 - 34.8 g/dL    RDW 12.7 11.8 - 14.4 %    Platelets 161 208 - 093 k/uL    MPV 10.8 8.1 - 13.5 fL    NRBC Automated 0.0 0.0 per 100 WBC    Differential Type NOT REPORTED     Seg Neutrophils 68 (H) 36 - 65 %    Lymphocytes 21 (L) 24 - 43 %    Monocytes 5 3 - 12 %    Eosinophils % 5 (H) 1 - 4 %    Basophils 1 0 - 2 %    Immature Granulocytes 0 0 %    Segs Absolute 5.52 1.50 - 8.10 k/uL    Absolute Lymph # 1.68 1.10 - 3.70 k/uL    Absolute Mono # 0.42 0.10 - 1.20 k/uL    Absolute Eos # 0.40 0.00 - 0.44 k/uL    Basophils Absolute 0.09 0.00 - 0.20 k/uL    Absolute Immature Granulocyte <0.03 0.00 - 0.30 k/uL    WBC Morphology NOT REPORTED     RBC Morphology NOT REPORTED     Platelet Estimate NOT REPORTED    Comprehensive Metabolic Panel w/ Range    Anion Gap 14 7 - 16 mmol/L   POCT Glucose   Result Value Ref Range    Glucose 639 mg/dL    QC OK? ok    POC Glucose Fingerstick   Result Value Ref Range    POC Glucose 309 (H) 75 - 110 mg/dL       IMPRESSION: 79-year-old male presents for nausea vomiting and concerns of DKA. Patient appears to be in no acute distress and nontoxic-appearing. No signs of respiratory distress and patient has regular rate and rhythm on exam.  Abdomen soft nontender with no guarding/rigidity/tenderness and cap refill is less than 2 seconds. Concern for above differential diagnosis. Will order VBG, point-of-care glucose, CBC, CMP, lipase, magnesium, IV fluids, urinalysis, serum hydroxybutyrate. Patient reports that he will not be admitted to the hospital and just wants symptomatic at this time. Likely discharge home. RADIOLOGY:  None    EKG  none    All EKG's are interpreted by the Emergency Department Physician who either signs or Co-signs this chart in the absence of a cardiologist.    EMERGENCY DEPARTMENT COURSE:  ED Course as of Apr 04 2309   Sun Apr 04, 2021   1210 VBG reveals 7.354/38.4/40.4/21.4 with a base deficit of 4, concerning for metabolic acidosis. In addition plan of care glucose is 639 and point-of-care lactic acid 2.98. Suspect the patient is going through dehydration with hyperglycemia as he is not acidotic and does not meet criteria for DKA.    [CS]   0315 CBC is nonconcerning.    [CS]   1237 Serum beta hydroxybutyrate is elevated at 2.91. Lipase is normal at 47. [CS]   0937 VTD reveals hyperglycemia of 587 with an anion gap of 18 and a bicarb of 18 as well. Patient also has slight transaminitis of 40 for his AST and ALT of 49. Patient has no right upper quadrant tenderness or Yi sign on examination so have at this time no suspicion for choledocholithiasis, cholelithiasis, or acute cholecystitis. In addition while the patient's bicarb is 18 with an anion gap of 18, patient's VBG is normal and so he is not in DKA. Will get a point-of-care glucose after receiving 1 L of fluids to reassess management. [CS]   5705 Magnesium is normal    [CS]   1313 Repeat blood sugar is 309. POC Glucose(!): 309 [CS]   1405 Talk to patient at bedside. Patient states that overall he is feeling significantly better with Celexa more Zofran for his nausea. Patient notes that he does not want to be admitted and would like to go home as it is [place] to see his family. If discussion about the risks of possible DKA and worsening of symptoms and he still wished to go home. Patient was agreeable discharge plan was educated return precautions. Patient received 1 more dose of Zofran and will be discharged home.    [CS]      ED Course User Index  [CS] Wanda Lawler DO         PROCEDURES:  none    CONSULTS:  None    CRITICAL CARE:  Please see attending note    FINAL IMPRESSION      1. Hyperglycemia    2.  Diabetes mellitus with no complication ([address]    3. Dehydration          DISPOSITION / PLAN     DISPOSITION Decision To Discharge 04/04/2021 02:01:51 PM      PATIENT REFERRED TO:  Marvin Moran MD  [address]  [phone]    Schedule an appointment as soon as possible for a visit in 3 days  for reevaluation regarding this visit    [facility]  [address] [place] [address]  [phone]  Go to   If symptoms worsen      DISCHARGE MEDICATIONS:  Discharge Medication List as of 4/4/2021  2:02 PM          Wanda Lawler DO  Emergency Medicine Resident    (Please note that portions of thisnote were completed with a voice recognition program.  Efforts were made to edit the dictations but occasionally words are mis-transcribed.)       Wanda Lawler DO  Resident  04/04/21 3682

## 2021-04-04 NOTE — ED PROVIDER NOTES
[facility]  ED  eMERGENCY dEPARTMENT eNCOUnter   Attending Attestation     Pt Name: Yuliana Gonzáles  MRN: [id number]  [place] 1990  Date of evaluation: 4/4/21       Yuliana Gonzáles is a 27 y.o. male who presents with Hyperglycemia (600 BS. pt complains of nausea and dehydration)      History: Patient has a hyperglycemia that reading high at home. Patient says that he is not been eating well or drinking well and not been taking care of himself secondary to break-up with girlfriend. Patient does not want a stay in the hospital.  Patient was to be treated and discharge. Exam: Heart rate and rhythm are regular. Lungs are clear to auscultation bilaterally. Abdomen soft, nontender. Patient is well-appearing. Plan for    Plan for DKA work-up, fluids, reassessment. Patient did take 15 units of insulin prior to arrival.      I performed a history and physical examination of the patient and discussed management with the resident. I reviewed the residents note and agree with the documented findings and plan of care. Any areas of disagreement are noted on the chart. I was personally present for the key portions of any procedures. I have documented in the chart those procedures where I was not present during the key portions. I have personally reviewed all images and agree with the resident's interpretation. I have reviewed the emergency nurses triage note. I agree with the chief complaint, past medical history, past surgical history, allergies, medications, social and family history as documented unless otherwise noted below. Documentation of the HPI, Physical Exam and Medical Decision Making performed by medical students or scribes is based on my personal performance of the HPI, PE and MDM.  For Phys Assistant/ Nurse Practitioner cases/documentation I have had a face to face evaluation of this patient and have completed at least one if not all key elements of the E/M (history, physical exam, and MDM). Additional findings are as noted. For APC cases I have personally evaluated and examined the patient in conjunction with the APC and agree with the treatment plan and disposition of the patient as recorded by the APC.     Kalli Candelaria MD  Attending Emergency  Physician       Byron Marquez MD  04/04/21 7984

## 2021-04-04 NOTE — ED NOTES
Pt presented to ED with complaints of hyperglycemia and nausea. Pt is type 1 diabetic. Pt states he has not been following diet well. Pt complains of body aches. Pt alert and oriented x 4. RR even and non labored.      Kevin Lim RN  04/04/21 1212

## 2021-05-03 ENCOUNTER — APPOINTMENT (OUTPATIENT)
Dept: GENERAL RADIOLOGY | Age: 31
End: 2021-05-03
Payer: COMMERCIAL

## 2021-05-03 ENCOUNTER — HOSPITAL ENCOUNTER (EMERGENCY)
Age: 31
Discharge: HOME | End: 2021-05-03
Attending: EMERGENCY MEDICINE
Payer: COMMERCIAL

## 2021-05-03 VITALS
HEART RATE: 81 BPM | RESPIRATION RATE: 24 BRPM | TEMPERATURE: 98.9 F | HEIGHT: 72 IN | WEIGHT: 180 LBS | DIASTOLIC BLOOD PRESSURE: 82 MMHG | OXYGEN SATURATION: 99 % | BODY MASS INDEX: 24.38 KG/M2 | SYSTOLIC BLOOD PRESSURE: 136 MMHG

## 2021-05-03 DIAGNOSIS — E10.10 DIABETIC KETOACIDOSIS WITHOUT COMA ASSOCIATED WITH TYPE 1 DIABETES MELLITUS (HCC): Primary | ICD-10-CM

## 2021-05-03 LAB
ABSOLUTE EOS #: 0.12 K/UL (ref 0–0.44)
ABSOLUTE IMMATURE GRANULOCYTE: 0.09 K/UL (ref 0–0.3)
ABSOLUTE LYMPH #: 2.41 K/UL (ref 1.1–3.7)
ABSOLUTE MONO #: 1.01 K/UL (ref 0.1–1.2)
ALBUMIN SERPL-MCNC: 4.2 G/DL (ref 3.5–5.2)
ALBUMIN/GLOBULIN RATIO: 1.6 (ref 1–2.5)
ALLEN TEST: ABNORMAL
ALLEN TEST: ABNORMAL
ALP BLD-CCNC: 128 U/L (ref 40–129)
ALT SERPL-CCNC: 105 U/L (ref 5–41)
ANION GAP SERPL CALCULATED.3IONS-SCNC: 19 MMOL/L (ref 9–17)
ANION GAP SERPL CALCULATED.3IONS-SCNC: 24 MMOL/L (ref 9–17)
ANION GAP: 18 MMOL/L (ref 7–16)
AST SERPL-CCNC: 79 U/L
BASOPHILS # BLD: 1 % (ref 0–2)
BASOPHILS ABSOLUTE: 0.08 K/UL (ref 0–0.2)
BETA-HYDROXYBUTYRATE: 2.81 MMOL/L (ref 0.02–0.27)
BILIRUB SERPL-MCNC: 1.66 MG/DL (ref 0.3–1.2)
BILIRUBIN URINE: NEGATIVE
BUN BLDV-MCNC: 18 MG/DL (ref 6–20)
BUN BLDV-MCNC: 20 MG/DL (ref 6–20)
BUN/CREAT BLD: ABNORMAL (ref 9–20)
BUN/CREAT BLD: ABNORMAL (ref 9–20)
CALCIUM SERPL-MCNC: 7.1 MG/DL (ref 8.6–10.4)
CALCIUM SERPL-MCNC: 9 MG/DL (ref 8.6–10.4)
CARBOXYHEMOGLOBIN: 1.6 % (ref 0–5)
CHLORIDE BLD-SCNC: 102 MMOL/L (ref 98–107)
CHLORIDE BLD-SCNC: 95 MMOL/L (ref 98–107)
CO2: 10 MMOL/L (ref 20–31)
CO2: 14 MMOL/L (ref 20–31)
COLOR: YELLOW
COMMENT UA: ABNORMAL
CREAT SERPL-MCNC: 0.8 MG/DL (ref 0.7–1.2)
CREAT SERPL-MCNC: 0.87 MG/DL (ref 0.7–1.2)
DIFFERENTIAL TYPE: ABNORMAL
EOSINOPHILS RELATIVE PERCENT: 1 % (ref 1–4)
FIO2: ABNORMAL
FIO2: ABNORMAL
GFR AFRICAN AMERICAN: >60 ML/MIN
GFR AFRICAN AMERICAN: >60 ML/MIN
GFR NON-AFRICAN AMERICAN: >60 ML/MIN
GFR SERPL CREATININE-BSD FRML MDRD: >60 ML/MIN
GFR SERPL CREATININE-BSD FRML MDRD: ABNORMAL ML/MIN/{1.73_M2}
GFR SERPL CREATININE-BSD FRML MDRD: NORMAL ML/MIN/{1.73_M2}
GLUCOSE BLD-MCNC: 283 MG/DL (ref 70–99)
GLUCOSE BLD-MCNC: 295 MG/DL (ref 74–100)
GLUCOSE BLD-MCNC: 323 MG/DL (ref 70–99)
GLUCOSE BLD-MCNC: 380 MG/DL (ref 75–110)
GLUCOSE BLD-MCNC: 492 MG/DL
GLUCOSE BLD-MCNC: 492 MG/DL (ref 75–110)
GLUCOSE URINE: ABNORMAL
HCO3 VENOUS: 16.2 MMOL/L (ref 22–29)
HCO3 VENOUS: 9.7 MMOL/L (ref 24–30)
HCT VFR BLD CALC: 45.5 % (ref 40.7–50.3)
HEMOGLOBIN: 15.1 G/DL (ref 13–17)
IMMATURE GRANULOCYTES: 1 %
KETONES, URINE: ABNORMAL
LEUKOCYTE ESTERASE, URINE: NEGATIVE
LIPASE: 17 U/L (ref 13–60)
LYMPHOCYTES # BLD: 21 % (ref 24–43)
MAGNESIUM: 2.1 MG/DL (ref 1.6–2.6)
MCH RBC QN AUTO: 30.8 PG (ref 25.2–33.5)
MCHC RBC AUTO-ENTMCNC: 33.2 G/DL (ref 28.4–34.8)
MCV RBC AUTO: 92.9 FL (ref 82.6–102.9)
METHEMOGLOBIN: ABNORMAL % (ref 0–1.5)
MODE: ABNORMAL
MODE: ABNORMAL
MONOCYTES # BLD: 9 % (ref 3–12)
NEGATIVE BASE EXCESS, VEN: 14.8 MMOL/L (ref 0–2)
NEGATIVE BASE EXCESS, VEN: 7 (ref 0–2)
NITRITE, URINE: NEGATIVE
NOTIFICATION TIME: ABNORMAL
NOTIFICATION: ABNORMAL
NRBC AUTOMATED: 0 PER 100 WBC
O2 DEVICE/FLOW/%: ABNORMAL
O2 DEVICE/FLOW/%: ABNORMAL
O2 SAT, VEN: 89 % (ref 60–85)
O2 SAT, VEN: 96.8 % (ref 60–85)
OXYHEMOGLOBIN: ABNORMAL % (ref 95–98)
PATIENT TEMP: 37
PATIENT TEMP: ABNORMAL
PCO2, VEN, TEMP ADJ: ABNORMAL MMHG (ref 39–55)
PCO2, VEN: 20 (ref 39–55)
PCO2, VEN: 26.3 MM HG (ref 41–51)
PDW BLD-RTO: 13.8 % (ref 11.8–14.4)
PEEP/CPAP: ABNORMAL
PH UA: 5 (ref 5–8)
PH VENOUS: 7.31 (ref 7.32–7.42)
PH VENOUS: 7.4 (ref 7.32–7.43)
PH, VEN, TEMP ADJ: ABNORMAL (ref 7.32–7.42)
PLATELET # BLD: 216 K/UL (ref 138–453)
PLATELET ESTIMATE: ABNORMAL
PMV BLD AUTO: 10.7 FL (ref 8.1–13.5)
PO2, VEN, TEMP ADJ: ABNORMAL MMHG (ref 30–50)
PO2, VEN: 55.5 MM HG (ref 30–50)
PO2, VEN: 91.3 (ref 30–50)
POC BUN: 18 MG/DL (ref 8–26)
POC CHLORIDE: 101 MMOL/L (ref 98–107)
POC CREATININE: 1 MG/DL (ref 0.51–1.19)
POC HEMATOCRIT: 49 % (ref 41–53)
POC HEMOGLOBIN: 16.8 G/DL (ref 13.5–17.5)
POC IONIZED CALCIUM: 1.18 MMOL/L (ref 1.15–1.33)
POC LACTIC ACID: 5.85 MMOL/L (ref 0.56–1.39)
POC PCO2 TEMP: ABNORMAL MM HG
POC PH TEMP: ABNORMAL
POC PO2 TEMP: ABNORMAL MM HG
POC POTASSIUM: 4.2 MMOL/L (ref 3.5–4.5)
POC SODIUM: 134 MMOL/L (ref 138–146)
POC TCO2: 16 MMOL/L (ref 22–30)
POSITIVE BASE EXCESS, VEN: ABNORMAL (ref 0–3)
POSITIVE BASE EXCESS, VEN: ABNORMAL MMOL/L (ref 0–2)
POTASSIUM SERPL-SCNC: 3.8 MMOL/L (ref 3.7–5.3)
POTASSIUM SERPL-SCNC: 4.3 MMOL/L (ref 3.7–5.3)
PROTEIN UA: NEGATIVE
PSV: ABNORMAL
PT. POSITION: ABNORMAL
RBC # BLD: 4.9 M/UL (ref 4.21–5.77)
RBC # BLD: ABNORMAL 10*6/UL
RESPIRATORY RATE: ABNORMAL
SAMPLE SITE: ABNORMAL
SAMPLE SITE: ABNORMAL
SEG NEUTROPHILS: 67 % (ref 36–65)
SEGMENTED NEUTROPHILS ABSOLUTE COUNT: 7.67 K/UL (ref 1.5–8.1)
SERUM OSMOLALITY: 301 MOSM/KG (ref 275–295)
SET RATE: ABNORMAL
SODIUM BLD-SCNC: 131 MMOL/L (ref 135–144)
SODIUM BLD-SCNC: 133 MMOL/L (ref 135–144)
SPECIFIC GRAVITY UA: 1.03 (ref 1–1.03)
TEXT FOR RESPIRATORY: ABNORMAL
TOTAL CO2, VENOUS: ABNORMAL MMOL/L (ref 23–30)
TOTAL HB: ABNORMAL G/DL (ref 12–16)
TOTAL PROTEIN: 6.9 G/DL (ref 6.4–8.3)
TOTAL RATE: ABNORMAL
TROPONIN INTERP: NORMAL
TROPONIN T: NORMAL NG/ML
TROPONIN, HIGH SENSITIVITY: 8 NG/L (ref 0–22)
TURBIDITY: CLEAR
URINE HGB: NEGATIVE
UROBILINOGEN, URINE: NORMAL
VT: ABNORMAL
WBC # BLD: 11.4 K/UL (ref 3.5–11.3)
WBC # BLD: ABNORMAL 10*3/UL

## 2021-05-03 PROCEDURE — 82947 ASSAY GLUCOSE BLOOD QUANT: CPT

## 2021-05-03 PROCEDURE — 96375 TX/PRO/DX INJ NEW DRUG ADDON: CPT

## 2021-05-03 PROCEDURE — 96361 HYDRATE IV INFUSION ADD-ON: CPT

## 2021-05-03 PROCEDURE — 6360000002 HC RX W HCPCS: Performed by: STUDENT IN AN ORGANIZED HEALTH CARE EDUCATION/TRAINING PROGRAM

## 2021-05-03 PROCEDURE — 85025 COMPLETE CBC W/AUTO DIFF WBC: CPT

## 2021-05-03 PROCEDURE — 71046 X-RAY EXAM CHEST 2 VIEWS: CPT

## 2021-05-03 PROCEDURE — 85014 HEMATOCRIT: CPT

## 2021-05-03 PROCEDURE — 80051 ELECTROLYTE PANEL: CPT

## 2021-05-03 PROCEDURE — 82330 ASSAY OF CALCIUM: CPT

## 2021-05-03 PROCEDURE — 82805 BLOOD GASES W/O2 SATURATION: CPT

## 2021-05-03 PROCEDURE — 96372 THER/PROPH/DIAG INJ SC/IM: CPT

## 2021-05-03 PROCEDURE — 6370000000 HC RX 637 (ALT 250 FOR IP): Performed by: STUDENT IN AN ORGANIZED HEALTH CARE EDUCATION/TRAINING PROGRAM

## 2021-05-03 PROCEDURE — 93005 ELECTROCARDIOGRAM TRACING: CPT

## 2021-05-03 PROCEDURE — 83735 ASSAY OF MAGNESIUM: CPT

## 2021-05-03 PROCEDURE — 36415 COLL VENOUS BLD VENIPUNCTURE: CPT

## 2021-05-03 PROCEDURE — 82565 ASSAY OF CREATININE: CPT

## 2021-05-03 PROCEDURE — 82010 KETONE BODYS QUAN: CPT

## 2021-05-03 PROCEDURE — 82803 BLOOD GASES ANY COMBINATION: CPT

## 2021-05-03 PROCEDURE — 83690 ASSAY OF LIPASE: CPT

## 2021-05-03 PROCEDURE — 99284 EMERGENCY DEPT VISIT MOD MDM: CPT

## 2021-05-03 PROCEDURE — 80048 BASIC METABOLIC PNL TOTAL CA: CPT

## 2021-05-03 PROCEDURE — 81003 URINALYSIS AUTO W/O SCOPE: CPT

## 2021-05-03 PROCEDURE — 84484 ASSAY OF TROPONIN QUANT: CPT

## 2021-05-03 PROCEDURE — 96374 THER/PROPH/DIAG INJ IV PUSH: CPT

## 2021-05-03 PROCEDURE — 2580000003 HC RX 258: Performed by: STUDENT IN AN ORGANIZED HEALTH CARE EDUCATION/TRAINING PROGRAM

## 2021-05-03 PROCEDURE — 84520 ASSAY OF UREA NITROGEN: CPT

## 2021-05-03 PROCEDURE — 83605 ASSAY OF LACTIC ACID: CPT

## 2021-05-03 PROCEDURE — 80053 COMPREHEN METABOLIC PANEL: CPT

## 2021-05-03 PROCEDURE — 83930 ASSAY OF BLOOD OSMOLALITY: CPT

## 2021-05-03 RX ORDER — 0.9 % SODIUM CHLORIDE 0.9 %
30 INTRAVENOUS SOLUTION INTRAVENOUS ONCE
Status: COMPLETED | OUTPATIENT
Start: 2021-05-03 | End: 2021-05-03

## 2021-05-03 RX ORDER — INSULIN GLARGINE 100 [IU]/ML
25 INJECTION, SOLUTION SUBCUTANEOUS NIGHTLY
Qty: 6 ML | Refills: 1 | Status: SHIPPED | OUTPATIENT
Start: 2021-05-03 | End: 2021-06-20

## 2021-05-03 RX ORDER — ONDANSETRON 2 MG/ML
4 INJECTION INTRAMUSCULAR; INTRAVENOUS ONCE
Status: COMPLETED | OUTPATIENT
Start: 2021-05-03 | End: 2021-05-03

## 2021-05-03 RX ORDER — KETOROLAC TROMETHAMINE 30 MG/ML
30 INJECTION, SOLUTION INTRAMUSCULAR; INTRAVENOUS ONCE
Status: COMPLETED | OUTPATIENT
Start: 2021-05-03 | End: 2021-05-03

## 2021-05-03 RX ORDER — INSULIN GLARGINE 100 [IU]/ML
25 INJECTION, SOLUTION SUBCUTANEOUS ONCE
Status: COMPLETED | OUTPATIENT
Start: 2021-05-03 | End: 2021-05-03

## 2021-05-03 RX ADMIN — INSULIN LISPRO 10 UNITS: 100 INJECTION, SOLUTION INTRAVENOUS; SUBCUTANEOUS at 18:54

## 2021-05-03 RX ADMIN — INSULIN GLARGINE 25 UNITS: 100 INJECTION, SOLUTION SUBCUTANEOUS at 19:51

## 2021-05-03 RX ADMIN — KETOROLAC TROMETHAMINE 30 MG: 30 INJECTION, SOLUTION INTRAMUSCULAR; INTRAVENOUS at 18:35

## 2021-05-03 RX ADMIN — ONDANSETRON 4 MG: 2 INJECTION INTRAMUSCULAR; INTRAVENOUS at 16:30

## 2021-05-03 RX ADMIN — SODIUM CHLORIDE 2400 ML: 9 INJECTION, SOLUTION INTRAVENOUS at 16:30

## 2021-05-03 ASSESSMENT — PAIN SCALES - GENERAL: PAINLEVEL_OUTOF10: 8

## 2021-05-03 ASSESSMENT — PAIN DESCRIPTION - LOCATION: LOCATION: GENERALIZED

## 2021-05-03 NOTE — ED PROVIDER NOTES
[facility]     Emergency Department     Faculty Attestation    I performed a history and physical examination of the patient and discussed management with the resident. I reviewed the resident´s note and agree with the documented findings and plan of care. Any areas of disagreement are noted on the chart. I was personally present for the key portions of any procedures. I have documented in the chart those procedures where I was not present during the key portions. I have reviewed the emergency nurses triage note. I agree with the chief complaint, past medical history, past surgical history, allergies, medications, social and family history as documented unless otherwise noted below. For Physician Assistant/ Nurse Practitioner cases/documentation I have personally evaluated this patient and have completed at least one if not all key elements of the E/M (history, physical exam, and MDM). Additional findings are as noted. Mild DKA, mucous membranes slightly dry, chest clear, heart exam normal, mild epigastric pain. Patient would like to go home once we hydrate him and make him feel better.      Namrata Leiva MD  05/03/21 [facility] [address], [place]  05/03/21 2007

## 2021-05-03 NOTE — ED PROVIDER NOTES
[facility] ED  Emergency Department Encounter  EmergencyMedicine Resident     Pt Adina Huitron  MRN: [id number]  [place] 1990  Date of evaluation: 5/3/21  PCP:  Cheikh Bocanegra MD    [address] [place] [address]       Chief Complaint   Patient presents with    Hyperglycemia     Pt c/o hyerglycemia, started with high readings last night, episodes of emesis today, all over bodyaches       HISTORY OF PRESENT ILLNESS  (Location/Symptom, Timing/Onset, Context/Setting, Quality, Duration, Modifying Factors, Severity.)      Lashaun Quintero is a 27 y.o. male who presents with dehydration vomiting for 3 days generalized body aches mild epigastric pain, weakness feels like he is in DKA. \    Patient insulin-dependent diabetic states has been compliant with his insulin has felt mild subjective fevers and occasional chest tightness over the past 2 days otherwise no complaints just feels like he is in DKA    No trauma no cough no shortness of breath,         PAST MEDICAL / SURGICAL / SOCIAL / FAMILY HISTORY      has a past medical history of Asthma, Carpal tunnel syndrome, bilateral, and Chronic back pain. has a past surgical history that includes Neck surgery (N/A, year of 2010). Social History     Socioeconomic History    Marital status: Single     Spouse name: Not on file    Number of children: Not on file    Years of education: Not on file    Highest education level: Not on file   Occupational History    Not on file   Social Needs    Financial resource strain: Not on file    Food insecurity     Worry: Not on file     Inability: Not on file    Transportation needs     Medical: Not on file     Non-medical: Not on file   Tobacco Use    Smoking status: Current Every Day Smoker     Packs/day: 1.00     Years: 4.00     Pack years: 4.00     Types: Cigarettes    Smokeless tobacco: Never Used   Substance and Sexual Activity    Alcohol use:  Yes    Drug use: Yes     Types: Marijuana    Sexual activity: Yes     Partners: Female   Lifestyle    Physical activity     Days per week: Not on file     Minutes per session: Not on file    Stress: Not on file   Relationships    Social connections     Talks on phone: Not on file     Gets together: Not on file     Attends Zoroastrian service: Not on file     Active member of club or organization: Not on file     Attends meetings of clubs or organizations: Not on file     Relationship status: Not on file    Intimate partner violence     Fear of current or ex partner: Not on file     Emotionally abused: Not on file     Physically abused: Not on file     Forced sexual activity: Not on file   Other Topics Concern    Not on file   Social History Narrative    Not on file       Family History   Problem Relation Age of Onset    Schizophrenia Mother     Glaucoma Mother     Asthma Mother        Allergies:  Bee venom and Penicillins    Home Medications:  Prior to Admission medications    Medication Sig Start Date End Date Taking?  Authorizing Provider   insulin glargine [facility]) 100 UNIT/ML injection pen Inject 25 Units into the skin nightly 5/3/21 6/20/21 Yes Dev Kaba DO   lisinopril (PRINIVIL;ZESTRIL) 2.5 MG tablet Take 1 tablet by mouth daily 9/8/20   Gonzalez Alfonso MD   pantoprazole (PROTONIX) 40 MG tablet Take 1 tablet by mouth every morning (before breakfast) 9/9/20   Gonzalez Alfonso MD   vitamin B-1 100 MG tablet Take 1 tablet by mouth daily 9/9/20   Gonzalez Alfonso MD   Insulin Pen Needle ([employer] PEN NEEDLES) 31G X 6 MM MISC 1 each by Does not apply route daily 9/8/20   Hawa Spaulding MD   ibuprofen (ADVIL;MOTRIN) 800 MG tablet Take 1 tablet by mouth every 8 hours as needed for Pain 6/27/19   SID Hernandez - CNP   aspirin 81 MG chewable tablet Take 1 tablet by mouth daily 7/25/18   Janette Erickson MD   insulin aspart (NOVOLOG FLEXPEN) 100 UNIT/ML injection pen Inject 3 Units into the skin 3 times daily (before meals) Use as per the scale provided  Patient taking differently: Inject into the skin 3 times daily (before meals) Use as per the scale provided pt on sliding scale 7/25/18   Marisa Kaplan MD   albuterol (PROVENTIL HFA) 108 (90 BASE) MCG/ACT inhaler Inhale 2 puffs into the lungs every 4 hours as needed for Wheezing. 3/21/13   Andriette Cheadle, MD       REVIEW OF SYSTEMS    (2-9 systems for level 4, 10 or more for level 5)      General ROS - positive fevers, No chills, no gradual weight loss, no night sweats  Ophthalmic ROS - No discharge, No changes in vision  ENT ROS -  No sore throat, No rhinorrhea,   Respiratory ROS - no shortness of breath, no cough, no  wheezing  Cardiovascular ROS - positive echest pain, no dyspnea on exertion  Gastrointestinal ROS - positive abdominal pain, positive nausea, positive vomiting, no change in bowel habits, no black or bloody stools  Genito-Urinary ROS - No dysuria, trouble voiding, or hematuria  Musculoskeletal ROS - No myalgias, positive arthalgias  Neurological ROS - No headache, no dizziness/lightheadedness, No focal weakness, no loss of sensation  Dermatological ROS - No lesions, No rash         PHYSICAL EXAM   (up to 7 for level 4, 8 or more for level 5)      INITIAL VITALS:   /82   Pulse 81   Temp 98.9 °F (37.2 °C) (Oral)   Resp 24   Ht 6' (1.829 m)   Wt 180 lb (81.6 kg)   SpO2 99%   BMI 24.41 kg/m²     General Appearance: Well-appearing, in no acute distress  HEENT: Head: normocephalic/atraumatic eyes: PERRLA, EOMT, conjunctiva not injected, sclerae nonicteric ears: External canals patent nose: Nares patent, no rhinorrhea, throat:mucous membranes dry, oropharynx clear     Neck: Trachea midline, no JVD. Lungs: No evidence of increased work of breathing. CTA B/L, no wheezes/rhonchi     Cardiovascular: Mildly tachycardic 100 on exam however strong regular pulse, no murmur, 2+ peripheral pulses bilaterally. Cap refill less than 2 seconds.   No lower extremity edema noted    Abdomen: Soft, nontender. No guarding or rebound tenderness. Specifically there is no epigastric pain or tenderness there is no flank pain  Neurologic: KENNY  to person, place, time, and event. No sensation deficits. Moving all extremities    Extremities: Skin warm, dry and intact.       DIFFERENTIAL  DIAGNOSIS     PLAN (LABS / IMAGING / EKG):  Orders Placed This Encounter   Procedures    XR CHEST (2 VW)    CBC WITH AUTO DIFFERENTIAL    COMPREHENSIVE METABOLIC PANEL    Troponin    BETA-HYDROXYBUTYRATE    OSMOLALITY    Urinalysis Reflex to Culture    MAGNESIUM    ELECTROLYTES PLUS    Hemoglobin and hematocrit, blood    CALCIUM, IONIC (POC)    Lipase    BASIC METABOLIC PANEL    BLOOD GAS, VENOUS    POC Blood Gas and Chemistry    Venous Blood Gas, POC    Creatinine W/GFR Point of Care    POCT urea (BUN)    Lactic Acid, POC    POCT Glucose    POCT glucose    POC Glucose Fingerstick    POC Glucose Fingerstick    EKG 12 Lead       MEDICATIONS ORDERED:  Orders Placed This Encounter   Medications    0.9 % sodium chloride bolus    ondansetron (ZOFRAN) injection 4 mg    DISCONTD: insulin regular (HUMULIN R;NOVOLIN R) injection 10 Units    insulin glargine (LANTUS) injection vial 25 Units    insulin lispro (HUMALOG) injection vial 10 Units    ketorolac (TORADOL) injection 30 mg    insulin glargine (BASAGLAR KWIKPEN) 100 UNIT/ML injection pen     Sig: Inject 25 Units into the skin nightly     Dispense:  6 mL     Refill:  1           DIAGNOSTIC RESULTS / EMERGENCY DEPARTMENT COURSE / MDM     LABS:  Results for orders placed or performed during the hospital encounter of 05/03/21   CBC WITH AUTO DIFFERENTIAL   Result Value Ref Range    WBC 11.4 (H) 3.5 - 11.3 k/uL    RBC 4.90 4.21 - 5.77 m/uL    Hemoglobin 15.1 13.0 - 17.0 g/dL    Hematocrit 45.5 40.7 - 50.3 %    MCV 92.9 82.6 - 102.9 fL    MCH 30.8 25.2 - 33.5 pg    MCHC 33.2 28.4 - 34.8 g/dL    RDW 13.8 11.8 - 14.4 % POC Glucose 380 (H) 75 - 110 mg/dL           RADIOLOGY:  No results found.     EKG  None    All EKG's are interpreted by the Emergency Department Physician who either signs or Co-signs this chart in the absence of a cardiologist.    EMERGENCY DEPARTMENT COURSE/IMPRESSION:    Insulin-dependent diabete has been compliant with his insulin feels like he has been having high blood sugars in the mid 400s nausea dehydration generalized abdominal pain, states he feels like he is going into DKA    Patient otherwise nontoxic-appearing does appear dehydrated dry mucous membranes    Plan is 30 cc/kg fluid bolus, Zofran DKA labs he is having mild chest discomfort relatively few risk factors and age makes ACS low lower concern however will get cardiac work-up in addition dispo pending work-up, potential discharge home if not insignificant DKA    ED Course as of May 03 2309   Mon May 03, 2021   1708 pH, Demarco: 7.396 [EF]   1709 POC Potassium: 4.2 [EF]   1751 Beta-Hydroxybutyrate(!): 2.81 [EF]   1751 Glucose(!): 283 [EF]   1751 Sodium(!): 133 [EF]   1751 CO2(!): 14 [EF]   1751 EKGEKG Interpretation Interpreted by emergency department physician Rhythm: normal sinus Rate: normalAxis: normalEctopy: noneConduction: normalST Segments: no acute changeT Waves: no acute changesQ Waves: none Clinical Impression: no acute changes    [EF]   1752 CO2(!): 14 [EF]   1832 Repeat questioning patient does admit to drinking 1 day ago, this likely explains mild transaminitis repeat bili exam soft nontender is having mild epigastric discomfort we will give Toradol however low concern for acute abdomen    In conjunction with pharmacy will give patient's home short and long-acting insulin    Fluids repeat metabolic panel DVT likely early onset developing DKA however patient has appetite not vomiting at this time believe we can optimize patient and send him home after getting his long-acting insulin    Patient admits he is not been using his long-acting insulin for the past 2 weeks because he ran out only short acting    His long-acting is 25 units of Lantus at night    [EF]   2120 pH, Demarco(!): 7.308 [EF]   2120 pCO2, Demarco(!): 20.0 [EF]   2120 CO2(!): 10 [EF]   2143 Patient is in DKA worsening labs although patient appears well, I discussed we need to start insulin drip and admit patient for DKA, patient would like to leave [address]    Discussed that patient could decompensate and possibly die at home patient understands and states he is work and needs to go home and he feels better he is awake alert has decision-making capacity is clinically sober    We will discharge [address] and make sure he has a prescription for his long-acting Lantus discussing come back at any time if his wanting to get admitted or feeling worse    [EF]      ED Course User Index  [EF] Aurelia Garcia DO       PROCEDURES:  None    CONSULTS:  None    CRITICAL CARE:  None    FINAL IMPRESSION      1. Diabetic ketoacidosis without coma associated with type 1 diabetes mellitus ([facility] [address]          DISPOSITION / PLAN     DISPOSITION [place] 05/03/2021 09:44:33 PM      PATIENT REFERRED TO:  Avni Burch MD  [address] [place] [address]  [phone]    Go in 1 day        DISCHARGE MEDICATIONS:  Discharge Medication List as of 5/3/2021  9:45 PM          DO Brooke Pablo D.O.   Emergency Medicine Resident    (Please note that portions of this note were completed with a voice recognition program.  Efforts were made to edit the dictations but occasionally words aremis-transcribed.)       Aurelia Garcia DO  Resident  05/03/21 5652

## 2021-05-07 LAB
EKG ATRIAL RATE: 101 BPM
EKG P AXIS: 77 DEGREES
EKG P-R INTERVAL: 120 MS
EKG Q-T INTERVAL: 346 MS
EKG QRS DURATION: 90 MS
EKG QTC CALCULATION (BAZETT): 448 MS
EKG R AXIS: 48 DEGREES
EKG T AXIS: -5 DEGREES
EKG VENTRICULAR RATE: 101 BPM

## 2021-06-11 ENCOUNTER — HOSPITAL ENCOUNTER (EMERGENCY)
Age: 31
Discharge: LEFT BEFORE DISCHARGE | End: 2021-06-11
Attending: EMERGENCY MEDICINE
Payer: COMMERCIAL

## 2021-06-11 VITALS
RESPIRATION RATE: 16 BRPM | HEART RATE: 83 BPM | TEMPERATURE: 98.4 F | SYSTOLIC BLOOD PRESSURE: 129 MMHG | OXYGEN SATURATION: 99 % | DIASTOLIC BLOOD PRESSURE: 84 MMHG

## 2021-06-11 DIAGNOSIS — R73.9 HYPERGLYCEMIA: Primary | ICD-10-CM

## 2021-06-11 PROCEDURE — 99283 EMERGENCY DEPT VISIT LOW MDM: CPT

## 2021-06-11 RX ORDER — SODIUM CHLORIDE, SODIUM LACTATE, POTASSIUM CHLORIDE, AND CALCIUM CHLORIDE .6; .31; .03; .02 G/100ML; G/100ML; G/100ML; G/100ML
1000 INJECTION, SOLUTION INTRAVENOUS ONCE
Status: DISCONTINUED | OUTPATIENT
Start: 2021-06-11 | End: 2021-06-11 | Stop reason: HOSPADM

## 2021-06-11 RX ORDER — ONDANSETRON 2 MG/ML
4 INJECTION INTRAMUSCULAR; INTRAVENOUS ONCE
Status: DISCONTINUED | OUTPATIENT
Start: 2021-06-11 | End: 2021-06-11 | Stop reason: HOSPADM

## 2021-06-11 RX ORDER — ONDANSETRON 4 MG/1
4 TABLET, FILM COATED ORAL EVERY 8 HOURS PRN
Qty: 5 TABLET | Refills: 0 | Status: SHIPPED | OUTPATIENT
Start: 2021-06-11

## 2021-06-11 ASSESSMENT — ENCOUNTER SYMPTOMS
NAUSEA: 1
COUGH: 0
VOMITING: 0
ABDOMINAL PAIN: 0
SHORTNESS OF BREATH: 0
RHINORRHEA: 0

## 2021-06-11 NOTE — ED PROVIDER NOTES
Patient does not want to stay or be seen, states he knows what to do at home, I talked to him and asked him to come back if he has any difficulties at home this evening. Earl Mckeon MD  06/11/21 1906    I did not examine this patient.        Earl Mckeon MD  06/11/21 1925

## 2021-06-11 NOTE — ED PROVIDER NOTES
ibuprofen (ADVIL;MOTRIN) 800 MG tablet Take 1 tablet by mouth every 8 hours as needed for Pain 6/27/19   SID David - CNP   aspirin 81 MG chewable tablet Take 1 tablet by mouth daily 7/25/18   Jerardo Galvin MD   insulin aspart (NOVOLOG FLEXPEN) 100 UNIT/ML injection pen Inject 3 Units into the skin 3 times daily (before meals) Use as per the scale provided  Patient taking differently: Inject into the skin 3 times daily (before meals) Use as per the scale provided pt on sliding scale 7/25/18   Jerardo Galvin MD   albuterol (PROVENTIL HFA) 108 (90 BASE) MCG/ACT inhaler Inhale 2 puffs into the lungs every 4 hours as needed for Wheezing. 3/21/13   Maksim Meek MD       REVIEW OF SYSTEMS    (2-9 systems for level 4, 10 or more for level 5)    Review of Systems   Constitutional: Positive for fatigue. Negative for chills and fever. HENT: Negative for congestion and rhinorrhea. Respiratory: Negative for cough and shortness of breath. Cardiovascular: Negative for chest pain. Gastrointestinal: Positive for nausea. Negative for abdominal pain and vomiting. Genitourinary: Negative for dysuria and flank pain. Skin: Positive for pallor. Neurological: Negative for light-headedness and headaches. All other systems reviewed and are negative. PHYSICAL EXAM   (up to 7 for level 4, 8 or more for level 5)    INITIAL VITALS:   ED Triage Vitals   BP Temp Temp src Pulse Resp SpO2 Height Weight   -- -- -- -- -- -- -- --       Physical Exam  Vitals and nursing note reviewed. Constitutional:       General: He is not in acute distress. Appearance: Normal appearance. He is ill-appearing and diaphoretic. Cardiovascular:      Rate and Rhythm: Normal rate and regular rhythm. Pulses: Normal pulses. Radial pulses are 2+ on the right side and 2+ on the left side. Heart sounds: Normal heart sounds. No murmur heard. No friction rub.    Pulmonary:      Effort: Pulmonary effort is Options  Hyperglycemia: new, needed workup     Amount and/or Complexity of Data Reviewed  Clinical lab tests: ordered  Review and summarize past medical records: yes  Discuss the patient with other providers: yes    Risk of Complications, Morbidity, and/or Mortality  Presenting problems: moderate    Patient Progress  Patient progress: stable      PROCEDURES:  none    CONSULTS:  None    CRITICAL CARE:  Please see attending note    FINAL IMPRESSION     1. Hyperglycemia          DISPOSITION / PLAN   DISPOSITION [place] 06/11/2021 07:05:42 PM      Evaluation and treatment course in the ED, and plan of care upon discharge was discussed in length with the patient. Patient had no further questions prior to being discharged and was instructed to return to the ED for new or worsening symptoms. Any changes to existing medications or new prescriptions were reviewed with patient and they expressed understanding of how to correctly take their medications and the possible side effects.     PATIENT REFERRED TO:  Roselyn Watson MD  [place] [phone]    In 1 day        DISCHARGE MEDICATIONS:  Discharge Medication List as of 6/11/2021  7:16 PM      START taking these medications    Details   ondansetron (ZOFRAN) 4 MG tablet Take 1 tablet by mouth every 8 hours as needed for Nausea, Disp-5 tablet, R-0Print             Talha Augustin DO  Emergency Medicine Resident Physician, PGY-3    (Please note that portions of this note were completed with a voice recognition program.  Efforts were made to edit the dictations but occasionally words are mis-transcribed.)         Talha Augustin MD  06/11/21 5257

## 2021-09-29 NOTE — CARE COORDINATION
Called to check on PA for levemir, no decision yet, they will fax to [facility] number with decision.   PA number is [phone], [address] [place] [address] to check on status
JOINT PAIN

## 2021-11-03 ENCOUNTER — HOSPITAL ENCOUNTER (EMERGENCY)
Age: 31
Discharge: HOME | End: 2021-11-04
Attending: EMERGENCY MEDICINE
Payer: COMMERCIAL

## 2021-11-03 VITALS
RESPIRATION RATE: 18 BRPM | TEMPERATURE: 97.6 F | HEART RATE: 67 BPM | DIASTOLIC BLOOD PRESSURE: 78 MMHG | SYSTOLIC BLOOD PRESSURE: 132 MMHG | OXYGEN SATURATION: 100 %

## 2021-11-03 DIAGNOSIS — E11.65 HYPERGLYCEMIA DUE TO DIABETES MELLITUS (HCC): Primary | ICD-10-CM

## 2021-11-03 LAB
BILIRUBIN URINE: NEGATIVE
COLOR: YELLOW
COMMENT UA: ABNORMAL
GLUCOSE BLD-MCNC: 423 MG/DL (ref 75–110)
GLUCOSE BLD-MCNC: 427 MG/DL (ref 75–110)
GLUCOSE BLD-MCNC: 487 MG/DL (ref 75–110)
GLUCOSE URINE: ABNORMAL
KETONES, URINE: ABNORMAL
LEUKOCYTE ESTERASE, URINE: NEGATIVE
NITRITE, URINE: NEGATIVE
PH UA: 6 (ref 5–8)
PROTEIN UA: NEGATIVE
SPECIFIC GRAVITY UA: 1.03 (ref 1–1.03)
TURBIDITY: CLEAR
URINE HGB: NEGATIVE
UROBILINOGEN, URINE: NORMAL

## 2021-11-03 PROCEDURE — 85025 COMPLETE CBC W/AUTO DIFF WBC: CPT

## 2021-11-03 PROCEDURE — 6370000000 HC RX 637 (ALT 250 FOR IP): Performed by: STUDENT IN AN ORGANIZED HEALTH CARE EDUCATION/TRAINING PROGRAM

## 2021-11-03 PROCEDURE — 96372 THER/PROPH/DIAG INJ SC/IM: CPT

## 2021-11-03 PROCEDURE — 99283 EMERGENCY DEPT VISIT LOW MDM: CPT

## 2021-11-03 PROCEDURE — 80048 BASIC METABOLIC PNL TOTAL CA: CPT

## 2021-11-03 PROCEDURE — 2580000003 HC RX 258: Performed by: STUDENT IN AN ORGANIZED HEALTH CARE EDUCATION/TRAINING PROGRAM

## 2021-11-03 PROCEDURE — 81003 URINALYSIS AUTO W/O SCOPE: CPT

## 2021-11-03 PROCEDURE — 82947 ASSAY GLUCOSE BLOOD QUANT: CPT

## 2021-11-03 PROCEDURE — 82010 KETONE BODYS QUAN: CPT

## 2021-11-03 RX ORDER — 0.9 % SODIUM CHLORIDE 0.9 %
1000 INTRAVENOUS SOLUTION INTRAVENOUS ONCE
Status: COMPLETED | OUTPATIENT
Start: 2021-11-03 | End: 2021-11-04

## 2021-11-03 RX ADMIN — SODIUM CHLORIDE 1000 ML: 9 INJECTION, SOLUTION INTRAVENOUS at 23:35

## 2021-11-03 RX ADMIN — INSULIN HUMAN 10 UNITS: 100 INJECTION, SOLUTION PARENTERAL at 22:37

## 2021-11-04 LAB
ABSOLUTE EOS #: 0.23 K/UL (ref 0–0.44)
ABSOLUTE IMMATURE GRANULOCYTE: <0.03 K/UL (ref 0–0.3)
ABSOLUTE LYMPH #: 1.83 K/UL (ref 1.1–3.7)
ABSOLUTE MONO #: 0.53 K/UL (ref 0.1–1.2)
ANION GAP SERPL CALCULATED.3IONS-SCNC: 14 MMOL/L (ref 9–17)
BASOPHILS # BLD: 1 % (ref 0–2)
BASOPHILS ABSOLUTE: 0.06 K/UL (ref 0–0.2)
BETA-HYDROXYBUTYRATE: 0.32 MMOL/L (ref 0.02–0.27)
BUN BLDV-MCNC: 16 MG/DL (ref 6–20)
BUN/CREAT BLD: ABNORMAL (ref 9–20)
CALCIUM SERPL-MCNC: 8.9 MG/DL (ref 8.6–10.4)
CHLORIDE BLD-SCNC: 97 MMOL/L (ref 98–107)
CO2: 20 MMOL/L (ref 20–31)
CREAT SERPL-MCNC: 0.64 MG/DL (ref 0.7–1.2)
DIFFERENTIAL TYPE: ABNORMAL
EOSINOPHILS RELATIVE PERCENT: 3 % (ref 1–4)
GFR AFRICAN AMERICAN: >60 ML/MIN
GFR NON-AFRICAN AMERICAN: >60 ML/MIN
GFR SERPL CREATININE-BSD FRML MDRD: ABNORMAL ML/MIN/{1.73_M2}
GFR SERPL CREATININE-BSD FRML MDRD: ABNORMAL ML/MIN/{1.73_M2}
GLUCOSE BLD-MCNC: 303 MG/DL (ref 75–110)
GLUCOSE BLD-MCNC: 447 MG/DL (ref 70–99)
HCT VFR BLD CALC: 45.9 % (ref 40.7–50.3)
HEMOGLOBIN: 15.4 G/DL (ref 13–17)
IMMATURE GRANULOCYTES: 0 %
LYMPHOCYTES # BLD: 21 % (ref 24–43)
MCH RBC QN AUTO: 31 PG (ref 25.2–33.5)
MCHC RBC AUTO-ENTMCNC: 33.6 G/DL (ref 28.4–34.8)
MCV RBC AUTO: 92.5 FL (ref 82.6–102.9)
MONOCYTES # BLD: 6 % (ref 3–12)
NRBC AUTOMATED: 0 PER 100 WBC
PDW BLD-RTO: 12.1 % (ref 11.8–14.4)
PLATELET # BLD: 213 K/UL (ref 138–453)
PLATELET ESTIMATE: ABNORMAL
PMV BLD AUTO: 11.1 FL (ref 8.1–13.5)
POTASSIUM SERPL-SCNC: 4.3 MMOL/L (ref 3.7–5.3)
RBC # BLD: 4.96 M/UL (ref 4.21–5.77)
RBC # BLD: ABNORMAL 10*6/UL
SEG NEUTROPHILS: 69 % (ref 36–65)
SEGMENTED NEUTROPHILS ABSOLUTE COUNT: 6.27 K/UL (ref 1.5–8.1)
SODIUM BLD-SCNC: 131 MMOL/L (ref 135–144)
WBC # BLD: 8.9 K/UL (ref 3.5–11.3)
WBC # BLD: ABNORMAL 10*3/UL

## 2021-11-04 PROCEDURE — 82947 ASSAY GLUCOSE BLOOD QUANT: CPT

## 2021-11-04 RX ORDER — INSULIN ASPART 100 [IU]/ML
3 INJECTION, SOLUTION INTRAVENOUS; SUBCUTANEOUS
Qty: 1 PEN | Refills: 0 | Status: SHIPPED | OUTPATIENT
Start: 2021-11-04

## 2021-11-04 RX ORDER — GLUCOSAMINE HCL/CHONDROITIN SU 500-400 MG
CAPSULE ORAL
Qty: 200 STRIP | Refills: 0 | Status: SHIPPED | OUTPATIENT
Start: 2021-11-04

## 2021-11-04 ASSESSMENT — ENCOUNTER SYMPTOMS
NAUSEA: 0
ABDOMINAL DISTENTION: 0
VOICE CHANGE: 0
APNEA: 0
VOMITING: 0
EYES NEGATIVE: 1
SHORTNESS OF BREATH: 0
TROUBLE SWALLOWING: 0
ABDOMINAL PAIN: 1
DIARRHEA: 0
SORE THROAT: 0

## 2021-11-04 NOTE — ED NOTES
Dr Sandrita Thorne at bedside, BS = 487. Pt refusing IV, just wants insulin. Pt states he ran out of it, needs rx for it.       Taqueria Oliva RN  11/03/21 5529

## 2021-11-04 NOTE — ED NOTES
Pt ambulatory to ED, states his bs is high, he is out of insulin. Pt states he was over 500 a little bit ago, has been in DKA and believes he is not currently. Pt alert and oriented x4 with NAD noted. Dr Neftaly Delgadillo at bedside.       Cameron Forbes RN  11/03/21 1837

## 2021-11-04 NOTE — ED NOTES
Pt sitting on cot. Pt denies any needs. Urine sample provided.       Sabrina Yeung LPN  83/94/72 [id number]

## 2021-11-04 NOTE — ED NOTES
Pt upright in bed. significant other at bedside. BG obtained which was 423.      Srikanth Oconnor LPN  92/24/34 4480

## 2021-11-04 NOTE — ED PROVIDER NOTES
[facility] ED  Emergency Department Encounter  Emergency Medicine Resident     Pt Name: Broderick Pierre  SRY:[id number]  [place] 1990  Date of evaluation: 11/3/21  PCP:  Ruben Mccurdy MD    [address] [place] [address]       Chief Complaint   Patient presents with    Hyperglycemia       HISTORY OF PRESENT ILLNESS  (Location/Symptom, Timing/Onset, Context/Setting, Quality, Duration, ModifyingFactors, Severity.)      Broderick Pierre is a 27 y.o. male with PMH of insulin-dependent type 1 diabetes mellitus. Patient states that he ran out of his insulin yesterday and went to the pharmacy to refill it however they were going to make him late for work so he decided not to  his insulin and go to work. Patient states that while at work he was able to test his blood sugar was said that his blood sugar was above 500. Patient coming into the emergency department questing to be given a dose of insulin, to have no labs or fluids given and had a prescription for his outpatient long-acting insulin. Patient denies: Changes in his mental status, nausea, vomiting, chest pain. Patient does state he has some mild abdominal grumbles/discomfort but otherwise asymptomatic. PAST MEDICAL / SURGICAL / SOCIAL /FAMILY HISTORY      has a past medical history of Asthma, Carpal tunnel syndrome, bilateral, and Chronic back pain. No other pertinent PMH on review with patient/guardian. has a past surgical history that includes Neck surgery (N/A, year of 2010). No other pertinent PSH on review with patient/guardian.   Social History     Socioeconomic History    Marital status: Single     Spouse name: Not on file    Number of children: Not on file    Years of education: Not on file    Highest education level: Not on file   Occupational History    Not on file   Tobacco Use    Smoking status: Current Every Day Smoker     Packs/day: 1.00     Years: 4.00     Pack years: 4.00     Types: Cigarettes    Smokeless tobacco: Never Used   Substance and Sexual Activity    Alcohol use: Yes    Drug use: Yes     Types: Marijuana Rula Brady)    Sexual activity: Yes     Partners: Female   Other Topics Concern    Not on file   Social History Narrative    Not on file     Social Determinants of Health     Financial Resource Strain:     Difficulty of Paying Living Expenses:    Food Insecurity:     Worried About Running Out of Food in the Last Year:     [address] in the Last Year:    Transportation Needs:     Lack of Transportation (Medical):  Lack of Transportation (Non-Medical):    Physical Activity:     Days of Exercise per Week:     Minutes of Exercise per Session:    Stress:     Feeling of Stress :    Social Connections:     Frequency of Communication with Friends and Family:     Frequency of Social Gatherings with Friends and Family:     Attends L[employer] Services:     Active Member of Clubs or Organizations:     Attends Club or Organization Meetings:     Marital Status:    Intimate Partner Violence:     Fear of Current or Ex-Partner:     Emotionally Abused:     Physically Abused:     Sexually Abused:        I counseled the patient against using tobacco products. Family History   Problem Relation Age of Onset   Ana Laura Gonzalez Schizophrenia Mother     Glaucoma Mother     Asthma Mother      No other pertinent FamHx on review with patient/guardian. Allergies:  Bee venom and Penicillins    Home Medications:  Prior to Admission medications    Medication Sig Start Date End Date Taking?  Authorizing Provider   ondansetron (ZOFRAN) 4 MG tablet Take 1 tablet by mouth every 8 hours as needed for Nausea 6/11/21   Siddharth Soils MD   insulin glargine [facility]) 100 UNIT/ML injection pen Inject 25 Units into the skin nightly 5/3/21 6/20/21  Opal Kaba DO   lisinopril (PRINIVIL;ZESTRIL) 2.5 MG tablet Take 1 tablet by mouth daily 9/8/20   Jack Alfonso MD   pantoprazole (PROTONIX) 40 MG tablet Take 1 tablet by mouth every morning (before breakfast) 9/9/20   Wendi Alfonso MD   vitamin B-1 100 MG tablet Take 1 tablet by mouth daily 9/9/20   Wendi Alfonso MD   Insulin Pen Needle ([employer] PEN NEEDLES) 31G X 6 MM MISC 1 each by Does not apply route daily 9/8/20   Jemal De Anda MD   ibuprofen (ADVIL;MOTRIN) 800 MG tablet Take 1 tablet by mouth every 8 hours as needed for Pain 6/27/19   Paloma Cano, APRN - CNP   aspirin 81 MG chewable tablet Take 1 tablet by mouth daily 7/25/18   Brianna Keen MD   insulin aspart (NOVOLOG FLEXPEN) 100 UNIT/ML injection pen Inject 3 Units into the skin 3 times daily (before meals) Use as per the scale provided  Patient taking differently: Inject into the skin 3 times daily (before meals) Use as per the scale provided pt on sliding scale 7/25/18   Brianna Keen MD   albuterol (PROVENTIL HFA) 108 (90 BASE) MCG/ACT inhaler Inhale 2 puffs into the lungs every 4 hours as needed for Wheezing. 3/21/13   Red Soliz MD       REVIEW OF SYSTEMS    (2-9 systems for level 4, 10 ormore for level 5)      Review of Systems   Constitutional: Negative for activity change, appetite change, fatigue and fever. HENT: Negative for congestion, sore throat, trouble swallowing and voice change. Eyes: Negative. Respiratory: Negative for apnea and shortness of breath. Cardiovascular: Negative for chest pain. Gastrointestinal: Positive for abdominal pain. Negative for abdominal distention, diarrhea, nausea and vomiting. Genitourinary: Negative. Musculoskeletal: Negative. Skin: Negative. Neurological: Negative. Psychiatric/Behavioral: Negative. PHYSICAL EXAM   (up to 7 for level 4, 8 or more for level 5)      INITIAL VITALS:   /78   Pulse 67   Temp 97.6 °F (36.4 °C) (Temporal)   Resp 18   SpO2 100%     Physical Exam  Constitutional:       Appearance: Normal appearance. HENT:      Head: Normocephalic and atraumatic.       Nose: Nose normal.      Mouth/Throat:      Mouth: Mucous membranes are moist.      Pharynx: Oropharynx is clear. Eyes:      Extraocular Movements: Extraocular movements intact. Conjunctiva/sclera: Conjunctivae normal.      Pupils: Pupils are equal, round, and reactive to light. Cardiovascular:      Rate and Rhythm: Normal rate and regular rhythm. Pulses: Normal pulses. Heart sounds: Normal heart sounds. Pulmonary:      Effort: Pulmonary effort is normal.      Breath sounds: Normal breath sounds. Abdominal:      General: Abdomen is flat. Palpations: Abdomen is soft. Musculoskeletal:         General: Normal range of motion. Cervical back: Normal range of motion and neck supple. Skin:     General: Skin is warm and dry. Capillary Refill: Capillary refill takes less than 2 seconds. Neurological:      General: No focal deficit present. Mental Status: He is alert.    Psychiatric:         Mood and Affect: Mood normal.         DIFFERENTIAL  DIAGNOSIS     DDX: Hyperglycemia, hyperosmotic hyperosmolar syndrome, hyperglycemia    PLAN (LABS / IMAGING / EKG):  Orders Placed This Encounter   Procedures    URINALYSIS    CBC Auto Differential    Basic Metabolic Panel w/ Reflex to MG    BETA-HYDROXYBUTERATE    POC Glucose Fingerstick    POC Glucose Fingerstick    POC Glucose Fingerstick       MEDICATIONS ORDERED:  Orders Placed This Encounter   Medications    insulin regular (HUMULIN R;NOVOLIN R) injection 10 Units    0.9 % sodium chloride bolus           DIAGNOSTIC RESULTS / EMERGENCY DEPARTMENT COURSE / MDM     LABS:  Results for orders placed or performed during the hospital encounter of 11/03/21   URINALYSIS   Result Value Ref Range    Color, UA Yellow Yellow    Turbidity UA Clear Clear    Glucose, Ur 3+ (A) NEGATIVE    Bilirubin Urine NEGATIVE NEGATIVE    Ketones, Urine SMALL (A) NEGATIVE    Specific Gravity, UA 1.028 1.005 - 1.030    Urine Hgb NEGATIVE NEGATIVE    pH, UA 6.0 5.0 - 8.0    Protein, UA NEGATIVE NEGATIVE    Urobilinogen, Urine Normal Normal    Nitrite, Urine NEGATIVE NEGATIVE    Leukocyte Esterase, Urine NEGATIVE NEGATIVE    Urinalysis Comments       Microscopic exam not performed based on chemical results unless requested in original order.    CBC Auto Differential   Result Value Ref Range    WBC 8.9 3.5 - 11.3 k/uL    RBC 4.96 4.21 - 5.77 m/uL    Hemoglobin 15.4 13.0 - 17.0 g/dL    Hematocrit 45.9 40.7 - 50.3 %    MCV 92.5 82.6 - 102.9 fL    MCH 31.0 25.2 - 33.5 pg    MCHC 33.6 28.4 - 34.8 g/dL    RDW 12.1 11.8 - 14.4 %    Platelets 250 519 - 056 k/uL    MPV 11.1 8.1 - 13.5 fL    NRBC Automated 0.0 0.0 per 100 WBC    Differential Type NOT REPORTED     Seg Neutrophils 69 (H) 36 - 65 %    Lymphocytes 21 (L) 24 - 43 %    Monocytes 6 3 - 12 %    Eosinophils % 3 1 - 4 %    Basophils 1 0 - 2 %    Immature Granulocytes 0 0 %    Segs Absolute 6.27 1.50 - 8.10 k/uL    Absolute Lymph # 1.83 1.10 - 3.70 k/uL    Absolute Mono # 0.53 0.10 - 1.20 k/uL    Absolute Eos # 0.23 0.00 - 0.44 k/uL    Basophils Absolute 0.06 0.00 - 0.20 k/uL    Absolute Immature Granulocyte <0.03 0.00 - 0.30 k/uL    WBC Morphology NOT REPORTED     RBC Morphology NOT REPORTED     Platelet Estimate NOT REPORTED    Basic Metabolic Panel w/ Reflex to MG   Result Value Ref Range    Glucose 447 (HH) 70 - 99 mg/dL    BUN 16 6 - 20 mg/dL    CREATININE 0.64 (L) 0.70 - 1.20 mg/dL    Bun/Cre Ratio NOT REPORTED 9 - 20    Calcium 8.9 8.6 - 10.4 mg/dL    Sodium 131 (L) 135 - 144 mmol/L    Potassium 4.3 3.7 - 5.3 mmol/L    Chloride 97 (L) 98 - 107 mmol/L    CO2 20 20 - 31 mmol/L    Anion Gap 14 9 - 17 mmol/L    GFR Non-African American >60 >60 mL/min    GFR African American >60 >60 mL/min    GFR Comment          GFR Staging NOT REPORTED    BETA-HYDROXYBUTERATE   Result Value Ref Range    Beta-Hydroxybutyrate 0.32 (H) 0.02 - 0.27 mmol/L   POC Glucose Fingerstick   Result Value Ref Range    POC Glucose 487 (HH) 75 - 110 mg/dL POC Glucose Fingerstick   Result Value Ref Range    POC Glucose 427 (HH) 75 - 110 mg/dL   POC Glucose Fingerstick   Result Value Ref Range    POC Glucose 423 (HH) 75 - 110 mg/dL         IMPRESSION/MDM/ED COURSE:  27 y.o. male presented with hyperglycemia. Patient requesting not to have any lab work done states that he understands the concerns for DKA that is not feel as though he is in it. Patient states that he is willing to give a urine sample. Will give 10 units subcu insulin, obtain urine sample and reevaluate. ED Course as of Nov 04 0041   Wed Nov 03, 2021   2320 Brief discussion with the patient. Patient is agreeing to be able to getting line labs and fluids. We will proceed    [ES]   Thu Nov 04, 2021 0041 Patient's labs have returned. Patient not in DKA. Will discharge this patient this time with follow-up with his pharmacy to ensure that he gets insulin as well as home with his significant other.    [ES]      ED Course User Index  [ES] Yung Farley MD       Patient/Guardian requesting discharge. Patient/Guardian was given written and verbal instructions prior to discharge. Patient/Guardian understood and agreed. Patient/Guardian had no further questions. RADIOLOGY:  No orders to display         EKG  None    All EKG's are interpreted by the Emergency Department Physician who either signs or Co-signs this chart in the absence of a cardiologist.      PROCEDURES:  None    CONSULTS:  None        FINAL IMPRESSION      1. Hyperglycemia due to diabetes mellitus ([facility] [address]          DISPOSITION / PLAN     DISPOSITION Decision To Discharge 11/04/2021 12:40:21 AM      PATIENT REFERREDTO:  No follow-up provider specified.     DISCHARGE MEDICATIONS:  New Prescriptions    No medications on file       Yung Farley MD  PGY 2  Resident Physician Emergency Medicine  11/04/21 12:41 AM        (Please note that portions of this note were completed with a voice recognition program.Efforts were made to edit the

## 2021-11-04 NOTE — ED PROVIDER NOTES
[facility] ED     Emergency Department     Faculty Attestation    I performed a history and physical examination of the patient and discussed management with the resident. I reviewed the residents note and agree with the documented findings and plan of care. Any areas of disagreement are noted on the chart. I was personally present for the key portions of any procedures. I have documented in the chart those procedures where I was not present during the key portions. I have reviewed the emergency nurses triage note. I agree with the chief complaint, past medical history, past surgical history, allergies, medications, social and family history as documented unless otherwise noted below. For Physician Assistant/ Nurse Practitioner cases/documentation I have personally evaluated this patient and have completed at least one if not all key elements of the E/M (history, physical exam, and MDM). Additional findings are as noted. This patient was evaluated in the Emergency Department for symptoms described in the history of present illness. He/she was evaluated in the context of the global COVID-19 pandemic, which necessitated consideration that the patient might be at risk for infection with the SARS-CoV-2 virus that causes COVID-19. Institutional protocols and algorithms that pertain to the evaluation of patients at risk for COVID-19 are in a state of rapid change based on information released by regulatory bodies including the CDC and federal and state organizations. These policies and algorithms were followed during the patient's care in the ED. Patient with elevated blood sugar known diabetic ran out of insulin but went to work and started going to the pharmacy to refill his prescription. Elevated sugar of 500. Denies abdominal pain vomiting. States he has had DKA before does not feel like that. On exam well-appearing nontoxic playing on his phone no smell of ketones. Well-hydrated.  Patient has declined any blood work or IV fluids at this time asking only for subcutaneous insulin but is willing to provide a urine sample. Will treat blood sugar reevaluate.  At this point patient has formal acknowledge risk of permanent disability and death from delayed diagnosis and management of DKA      Critical Care     none    Dasyi Sy MD, José Miguel Pulido  Attending Emergency  Physician             Daysi Sy MD  11/03/21 7332

## 2022-01-03 ENCOUNTER — HOSPITAL ENCOUNTER (EMERGENCY)
Age: 32
Discharge: HOME | End: 2022-01-03
Attending: EMERGENCY MEDICINE
Payer: COMMERCIAL

## 2022-01-03 VITALS
RESPIRATION RATE: 17 BRPM | TEMPERATURE: 98.3 F | OXYGEN SATURATION: 97 % | DIASTOLIC BLOOD PRESSURE: 76 MMHG | HEIGHT: 69 IN | SYSTOLIC BLOOD PRESSURE: 122 MMHG | HEART RATE: 104 BPM | WEIGHT: 190 LBS | BODY MASS INDEX: 28.14 KG/M2

## 2022-01-03 DIAGNOSIS — Z76.0 ENCOUNTER FOR MEDICATION REFILL: Primary | ICD-10-CM

## 2022-01-03 LAB — GLUCOSE BLD-MCNC: 111 MG/DL (ref 75–110)

## 2022-01-03 PROCEDURE — 99283 EMERGENCY DEPT VISIT LOW MDM: CPT

## 2022-01-03 PROCEDURE — 82947 ASSAY GLUCOSE BLOOD QUANT: CPT

## 2022-01-03 RX ORDER — INSULIN ASPART 100 [IU]/ML
3 INJECTION, SOLUTION INTRAVENOUS; SUBCUTANEOUS
Qty: 5 PEN | Refills: 0 | Status: SHIPPED | OUTPATIENT
Start: 2022-01-03

## 2022-01-03 NOTE — ED PROVIDER NOTES
[address] ED  eMERGENCY dEPARTMENT eNCOUnter      Pt Name: Bailey Tamez  MRN: [id number]  [place] 1990  Date of evaluation: 1/3/2022  Provider: Salome Hensley PA-C    CHIEF COMPLAINT       Chief Complaint   Patient presents with    Medication Refill           HISTORY OF PRESENT ILLNESS  (Location/Symptom, Timing/Onset, Context/Setting, Quality, Duration, Modifying Factors, Severity.)   Bailey Tamez is a 27 y.o. male who presents to the emergency department requesting medication refill on novolog. Pt states he ran out this morning. Pt did try calling his doctor this morning. They are on vacation and unable to get him in. Pt denies any chest pain, sob, nausea, emesis, abd pain, fevers. No other complaints. Nursing Notes were reviewed. REVIEW OF SYSTEMS    (2-9 systems for level 4, 10 or more for level 5)     Review of Systems   Medication refill request    Except as noted above the remainder of the review of systems was reviewed and negative. PAST MEDICAL HISTORY     Past Medical History:   Diagnosis Date    Asthma     Carpal tunnel syndrome, bilateral 3/21/2013    Chronic back pain 3/21/2013     None otherwise stated in nurses notes    SURGICAL HISTORY       Past Surgical History:   Procedure Laterality Date    NECK SURGERY N/A year of 2010     None otherwise stated in nurses notes    CURRENT MEDICATIONS       Discharge Medication List as of 1/3/2022  3:02 PM      CONTINUE these medications which have NOT CHANGED    Details   !! insulin aspart (NOVOLOG FLEXPEN) 100 UNIT/ML injection pen Inject 3 Units into the skin 3 times daily (before meals) Use as per the scale provided pt on sliding scale, Disp-1 pen, R-0Print      blood glucose monitor strips Test with meals times a day & as needed for symptoms of irregular blood glucose. Dispense sufficient amount for indicated testing frequency plus additional to accommodate PRN testing needs. , Disp-200 strip, R-0, Print ondansetron (ZOFRAN) 4 MG tablet Take 1 tablet by mouth every 8 hours as needed for Nausea, Disp-5 tablet, R-0Print      insulin glargine (BASAGLAR KWIKPEN) 100 UNIT/ML injection pen Inject 25 Units into the skin nightly, Disp-6 mL, R-1Print      lisinopril (PRINIVIL;ZESTRIL) 2.5 MG tablet Take 1 tablet by mouth daily, Disp-30 tablet,R-3Normal      pantoprazole (PROTONIX) 40 MG tablet Take 1 tablet by mouth every morning (before breakfast), Disp-30 tablet,R-3Normal      vitamin B-1 100 MG tablet Take 1 tablet by mouth daily, Disp-30 tablet,R-3Normal      Insulin Pen Needle ([employer] PEN NEEDLES) 31G X 6 MM MISC DAILY Starting Tue 9/8/2020, Disp-100 each,R-3, Normal      ibuprofen (ADVIL;MOTRIN) 800 MG tablet Take 1 tablet by mouth every 8 hours as needed for Pain, Disp-20 tablet, R-0Print      aspirin 81 MG chewable tablet Take 1 tablet by mouth daily, Disp-30 tablet, R-3Normal      albuterol (PROVENTIL HFA) 108 (90 BASE) MCG/ACT inhaler Inhale 2 puffs into the lungs every 4 hours as needed for Wheezing., Disp-1 Inhaler, R-3       !! - Potential duplicate medications found. Please discuss with provider. ALLERGIES     Bee venom and Penicillins    FAMILY HISTORY           Problem Relation Age of Onset   [facility] Schizophrenia Mother     Glaucoma Mother     Asthma Mother      Family Status   Relation Name Status    Mother  Alive    Father  Alive      None otherwise stated in nurses notes    SOCIAL HISTORY      reports that he has been smoking cigarettes. He has a 4.00 pack-year smoking history. He has never used smokeless tobacco. He reports current alcohol use. He reports current drug use. Drug: Marijuana Westfelicity Thomas).    lives at home with others     PHYSICAL EXAM    (up to 7 for level 4, 8 or more for level 5)     ED Triage Vitals [01/03/22 1439]   BP Temp Temp Source Pulse Resp SpO2 Height Weight   122/76 98.3 °F (36.8 °C) Oral 104 17 97 % 5' 9\" (1.753 m) 190 lb (86.2 kg)       Physical Exam   Nursing note and vitals reviewed. Constitutional: Oriented to person, place, and time and well-developed, well-nourished. Head: Normocephalic and atraumatic. Ear: External ears normal.   Nose: Nose normal and midline. Eyes: Conjunctivae and EOM are normal. Pupils are equal, round, and reactive to light. Neck: Normal range of motion. Cardiovascular: Normal rate, regular rhythm, normal heart sounds and intact distal pulses. Pulmonary/Chest: Effort normal and breath sounds normal. No respiratory distress. No wheezes. No rales. No chest tenderness. Musculoskeletal: Normal range of motion. Neurological: Alert and oriented to person, place, and time. GCS score is 15. Skin: Skin is warm and dry. No rash noted. No erythema. No pallor. Psychiatric: Mood, memory, affect and judgment normal.           DIAGNOSTIC RESULTS     EKG: All EKG's are interpreted by the Emergency Department Physician who either signs or Co-signs this chart in the absence of a cardiologist.        RADIOLOGY:   All plain film, CT, MRI, and formal ultrasound images (except ED bedside ultrasound) are read by the radiologist and the images and interpretations are directly viewed by the emergency physician. No orders to display       No results found. LABS:  Labs Reviewed   POC GLUCOSE FINGERSTICK - Abnormal; Notable for the following components:       Result Value    POC Glucose 111 (*)     All other components within normal limits       All other labs were within normal range or not returned as of this dictation.     EMERGENCY DEPARTMENT COURSE and DIFFERENTIAL DIAGNOSIS/MDM:   Vitals:    Vitals:    01/03/22 1439   BP: 122/76   Pulse: 104   Resp: 17   Temp: 98.3 °F (36.8 °C)   TempSrc: Oral   SpO2: 97%   Weight: 190 lb (86.2 kg)   Height: 5' 9\" (1.753 m)       Patient instructed to return to the emergency room if symptoms worsen, return, or any other concern right away which is agreed by the patient    ED MEDS:  Orders Placed This Encounter Medications    insulin aspart (NOVOLOG FLEXPEN) 100 UNIT/ML injection pen     Sig: Inject 3 Units into the skin 3 times daily (before meals)     Dispense:  5 pen     Refill:  0         CONSULTS:  None    PROCEDURES:  None      FINAL IMPRESSION      1. Encounter for medication refill          DISPOSITION/PLAN   DISPOSITION Decision To Discharge    PATIENT REFERRED TO:  Rustam Cortez MD  [facility] [address]  [address] ED  [place] [address]  [phone]          DISCHARGE MEDICATIONS:  Discharge Medication List as of 1/3/2022  3:02 PM      START taking these medications    Details   !! insulin aspart (NOVOLOG FLEXPEN) 100 UNIT/ML injection pen Inject 3 Units into the skin 3 times daily (before meals), Disp-5 pen, R-0Normal       !! - Potential duplicate medications found. Please discuss with provider. Summation      Patient Course:  Pt ran out of novolog this morning. Glucose here is 111. pt has no symptoms. Low concern for DKA. Will dc home with refill. Instructed to follow up as soon as possible with family doctor/specialist.  Patient denies any new symptoms. Discussed results and plan with the pt. They expressed appropriate understanding. Pt given close follow up, supportive care instructions and strict return instructions at the bedside. ED Medications administered this visit:  Medications - No data to display    New Prescriptions from this visit:    Discharge Medication List as of 1/3/2022  3:02 PM      START taking these medications    Details   !! insulin aspart (NOVOLOG FLEXPEN) 100 UNIT/ML injection pen Inject 3 Units into the skin 3 times daily (before meals), Disp-5 pen, R-0Normal       !! - Potential duplicate medications found. Please discuss with provider.           Follow-up:  Rustam Cortez MD  [facility] [address]          [facility] ED  [place] [address]            Final Impression:   1.  Encounter for medication refill               (Please note that portions of this note were completed with a voice recognition program.  Efforts were made to edit the dictations but occasionally words are mis-transcribed.)      (Please note that portions of this note were completed with a voice recognition program.  Efforts were made to edit the dictations but occasionally words are mis-transcribed.)    Daphney Mei, [address] PA-C  01/03/22 9420

## 2022-01-03 NOTE — ED PROVIDER NOTES
[address] ED  eMERGENCY dEPARTMENT eNCOUnter   Independent Attestation     Pt Name: Kenn Mercedes  MRN: [id number]  [place] 1990  Date of evaluation: 1/3/22       Kenn Mercedes is a 27 y.o. male who presents with Medication Refill        Based on the medical record, the care appears appropriate. I was personally available for consultation in the Emergency Department.     Kay Nava MD  Attending Emergency  Physician                 Kay Nava MD  01/03/22 0207

## 2022-01-03 NOTE — ED TRIAGE NOTES
Mode of arrival (squad #, walk in, police, etc) : Walk in        Chief complaint(s): Medication refill        Arrival Note (brief scenario, treatment PTA, etc). : Pt arrives to ED c/o medication refill for his insulin. Patient states that he ran out this morning after taking his morning dose with breakfast. POC glucose in triage 111. C= \"Have you ever felt that you should Cut down on your drinking? \"  No  A= \"Have people Annoyed you by criticizing your drinking? \"  No  G= \"Have you ever felt bad or Guilty about your drinking? \"  No  E= \"Have you ever had a drink as an Eye-opener first thing in the morning to steady your nerves or to help a hangover? \"  No      Deferred []      Reason for deferring: N/A    *If yes to two or more: probable alcohol abuse. *

## 2022-11-03 NOTE — PROGRESS NOTES
Addended by: DEON FETNON on: 11/3/2022 11:18 AM     Modules accepted: Orders     Pt requesting to go outside to smoke. Pt educated on the risk of leaving the floor while on insulin drip and potassium chloride infusion. Offered a nicotine patch instead. Pt declined nicotine patch, would prefer to go outside. Pt is A/Ox4 and steady on feet. [facility] NP Shannon Vick messaged via perfect serve. Per Gunjan Hennessy, Pt is ok to to go off of telemetry to leave the floor to smoke. Form to leave unit was signed by patient and witnesses by Rosanna Curtis. Order in [facility] was changed.

## 2023-03-15 NOTE — ED NOTES
Pt resting on cot with NAD.  Pt updated on BS results and POC      Romana Boggs RN  07/24/18 4315 Albendazole Counseling:  I discussed with the patient the risks of albendazole including but not limited to cytopenia, kidney damage, nausea/vomiting and severe allergy.  The patient understands that this medication is being used in an off-label manner.